# Patient Record
Sex: FEMALE | Race: WHITE | HISPANIC OR LATINO | Employment: FULL TIME | URBAN - METROPOLITAN AREA
[De-identification: names, ages, dates, MRNs, and addresses within clinical notes are randomized per-mention and may not be internally consistent; named-entity substitution may affect disease eponyms.]

---

## 2017-07-21 ENCOUNTER — HOSPITAL ENCOUNTER (EMERGENCY)
Facility: MEDICAL CENTER | Age: 27
End: 2017-07-21
Attending: EMERGENCY MEDICINE
Payer: MEDICAID

## 2017-07-21 VITALS
WEIGHT: 234.35 LBS | DIASTOLIC BLOOD PRESSURE: 40 MMHG | TEMPERATURE: 98.1 F | RESPIRATION RATE: 16 BRPM | BODY MASS INDEX: 41.52 KG/M2 | HEIGHT: 63 IN | HEART RATE: 92 BPM | SYSTOLIC BLOOD PRESSURE: 108 MMHG | OXYGEN SATURATION: 100 %

## 2017-07-21 DIAGNOSIS — B34.9 VIRAL SYNDROME: ICD-10-CM

## 2017-07-21 DIAGNOSIS — G43.019 INTRACTABLE MIGRAINE WITHOUT AURA AND WITHOUT STATUS MIGRAINOSUS: ICD-10-CM

## 2017-07-21 PROCEDURE — 36415 COLL VENOUS BLD VENIPUNCTURE: CPT

## 2017-07-21 PROCEDURE — 96375 TX/PRO/DX INJ NEW DRUG ADDON: CPT

## 2017-07-21 PROCEDURE — 700111 HCHG RX REV CODE 636 W/ 250 OVERRIDE (IP): Performed by: EMERGENCY MEDICINE

## 2017-07-21 PROCEDURE — 99284 EMERGENCY DEPT VISIT MOD MDM: CPT

## 2017-07-21 PROCEDURE — 96374 THER/PROPH/DIAG INJ IV PUSH: CPT

## 2017-07-21 PROCEDURE — 700111 HCHG RX REV CODE 636 W/ 250 OVERRIDE (IP)

## 2017-07-21 PROCEDURE — 700105 HCHG RX REV CODE 258: Performed by: EMERGENCY MEDICINE

## 2017-07-21 RX ORDER — KETOROLAC TROMETHAMINE 30 MG/ML
30 INJECTION, SOLUTION INTRAMUSCULAR; INTRAVENOUS ONCE
Status: COMPLETED | OUTPATIENT
Start: 2017-07-21 | End: 2017-07-21

## 2017-07-21 RX ORDER — DIPHENHYDRAMINE HYDROCHLORIDE 50 MG/ML
50 INJECTION INTRAMUSCULAR; INTRAVENOUS ONCE
Status: COMPLETED | OUTPATIENT
Start: 2017-07-21 | End: 2017-07-21

## 2017-07-21 RX ORDER — SODIUM CHLORIDE 9 MG/ML
1000 INJECTION, SOLUTION INTRAVENOUS ONCE
Status: COMPLETED | OUTPATIENT
Start: 2017-07-21 | End: 2017-07-21

## 2017-07-21 RX ORDER — DIPHENHYDRAMINE HYDROCHLORIDE 50 MG/ML
INJECTION INTRAMUSCULAR; INTRAVENOUS
Status: COMPLETED
Start: 2017-07-21 | End: 2017-07-21

## 2017-07-21 RX ADMIN — SODIUM CHLORIDE 1000 ML: 9 INJECTION, SOLUTION INTRAVENOUS at 02:25

## 2017-07-21 RX ADMIN — DIPHENHYDRAMINE HYDROCHLORIDE 50 MG: 50 INJECTION, SOLUTION INTRAMUSCULAR; INTRAVENOUS at 02:29

## 2017-07-21 RX ADMIN — PROCHLORPERAZINE EDISYLATE 10 MG: 5 INJECTION INTRAMUSCULAR; INTRAVENOUS at 02:26

## 2017-07-21 RX ADMIN — DIPHENHYDRAMINE HYDROCHLORIDE 50 MG: 50 INJECTION INTRAMUSCULAR; INTRAVENOUS at 02:29

## 2017-07-21 RX ADMIN — KETOROLAC TROMETHAMINE 30 MG: 30 INJECTION, SOLUTION INTRAMUSCULAR at 02:26

## 2017-07-21 ASSESSMENT — LIFESTYLE VARIABLES
EVER FELT BAD OR GUILTY ABOUT YOUR DRINKING: NO
TOTAL SCORE: 0
HAVE PEOPLE ANNOYED YOU BY CRITICIZING YOUR DRINKING: NO
TOTAL SCORE: 0
TOTAL SCORE: 0
CONSUMPTION TOTAL: INCOMPLETE
HAVE YOU EVER FELT YOU SHOULD CUT DOWN ON YOUR DRINKING: NO
DO YOU DRINK ALCOHOL: YES
EVER HAD A DRINK FIRST THING IN THE MORNING TO STEADY YOUR NERVES TO GET RID OF A HANGOVER: NO

## 2017-07-21 ASSESSMENT — PAIN SCALES - GENERAL
PAINLEVEL_OUTOF10: 6
PAINLEVEL_OUTOF10: 8

## 2017-07-21 NOTE — ED PROVIDER NOTES
ED Provider Note    CHIEF COMPLAINT  Chief Complaint   Patient presents with   • Headache       HPI  Abril Vance is a 26 y.o. female who presents here for evaluation of headache. Patient states that she has a history of intermittent migraines, states that she's been having some worse symptoms over the past month. The patient describes having worsening symptoms today associated with some bilateral hand numbness intermittently over the past 4 days. The patient describes the pain as throbbing, located posterior aspect of the skull, radiating forward, not particularly light sensitive, not associated with nausea, not maximal at onset, and not improved with Tylenol. The patient states that secondary to her headache she said present here for evaluation.    REVIEW OF SYSTEMS  See HPI for further details. All other systems are negative.     PAST MEDICAL HISTORY   has a past medical history of Gallstones; Supervision of normal pregnancy (4/5/2010); Morbid obesity (CMS-HCC) (4/5/2010); Migraines (4/5/2010); and Vaginal trichomoniasis (4/5/2010).    SOCIAL HISTORY  Social History     Social History Main Topics   • Smoking status: Never Smoker    • Smokeless tobacco: Not on file   • Alcohol Use: No   • Drug Use: No   • Sexual Activity: Not on file       SURGICAL HISTORY   has past surgical history that includes lauren by laparoscopy (10/14/2010).    CURRENT MEDICATIONS  Home Medications     Reviewed by Amy Noriega R.N. (Registered Nurse) on 07/21/17 at 0154  Med List Status: Partial    Medication Last Dose Status    cefUROXime (CEFTIN) 250 MG Tab  Active    hydrocodone-acetaminophen (NORCO) 5-325 MG Tab per tablet  Active    metoclopramide (REGLAN) 10 MG TABS  Active    sumatriptan (IMITREX) 50 MG TABS 8/2/2015 Active                ALLERGIES  Allergies   Allergen Reactions   • Nkda [No Known Drug Allergy]        PHYSICAL EXAM  VITAL SIGNS: /74 mmHg  Pulse 102  Temp(Src) 36.7 °C (98.1 °F)  Resp 18  Ht 1.6 m  "(5' 3\")  Wt 106.3 kg (234 lb 5.6 oz)  BMI 41.52 kg/m2  SpO2 100%  LMP 08/29/2009   Pulse ox interpretation: I interpret this pulse ox as normal.  Constitutional: Alert in mild distress.  HENT: Normocephalic, Atraumatic, Bilateral external ears normal. Nose normal.   Eyes: Pupils are equal and reactive. Conjunctiva normal, non-icteric.   Heart: Regular rate and rythm.  Lungs: No audible wheezing, no increased work of breathing, no accessory muscle use.  Abdomen: Soft, non-distended, non-tender   Skin: Warm, Dry, No erythema, No rash.   Neurologic: Alert, Grossly non-focal, strength is 5/5 in bilateral upper extremities, patient has intact sensation throughout the bilateral upper extremities.   Psychiatric: Affect normal, Judgment normal, Mood normal, appears alert and not intoxicated.       COURSE & MEDICAL DECISION MAKING  Pertinent Labs & Imaging studies reviewed. (See chart for details)    2:55 AM patient reevaluated, migraine is resolved    Patient presented here for evaluation of headache. The patient here has a normal neurologic exam, and given her symptoms and history likely has recurrence of migraine. This is likely triggered by viral syndrome given the patient's body aches and subjective fevers. Here she has no focal findings, has no history or exam findings suggestive of pneumonia, urinary infection or any skin infection. Given this, the patient was given a liter of normal saline secondary to some slight dehydration, Advil, Compazine, and Toradol for her migraine with significant improvement of her symptoms.    The patient will not drink alcohol nor drive with prescribed medications. The patient will return for worsening symptoms and is stable at the time of discharge. The patient verbalizes understanding and will comply.    The patient is referred to a primary physician for blood pressure management, diabetic screening, and for all other preventative health concerns, should they be present.    FINAL " IMPRESSION  1. Migraine headache, intractable, without aura  2.   3.         Electronically signed by: Luc Contreras, 7/21/2017 1:58 AM      This record was made with a voice recognition software. I have tried to correct any grammar, spelling or context errors to the best of my ability, but errors may still remain. Interpretation of this chart should be taken in this context.

## 2017-07-21 NOTE — ED AVS SNAPSHOT
Home Care Instructions                                                                                                                Abril Vance   MRN: [id number]    Department:  [facility], Emergency Dept   Date of Visit:  7/21/2017            [facility], Emergency Dept    [address]    [place] [address]    Phone:  [phone]      You were seen by     Luc Contreras M.D.      Your Diagnosis Was     Intractable migraine without aura and without status migrainosus     G43.019       These are the medications you received during your hospitalization from 07/21/2017 0013 to 07/21/2017 0259     Date/Time Order Dose Route Action    07/21/2017 0225 NS infusion 1,000 mL 1,000 mL Intravenous New Bag    07/21/2017 0226 ketorolac (TORADOL) injection 30 mg 30 mg Intravenous Given    07/21/2017 0229 diphenhydrAMINE (BENADRYL) injection 50 mg 50 mg Intravenous Given    07/21/2017 0226 prochlorperazine (COMPAZINE) injection 10 mg 10 mg Intravenous Given      Follow-up Information     1. Schedule an appointment as soon as possible for a visit with Yariel Eckert M.D..    Specialty:  Family Medicine    Contact information    [address]  [place] [address] [phone]        Medication Information     Review all of your home medications and newly ordered medications with your primary doctor and/or pharmacist as soon as possible. Follow medication instructions as directed by your doctor and/or pharmacist.     Please keep your complete medication list with you and share with your physician. Update the information when medications are discontinued, doses are changed, or new medications (including over-the-counter products) are added; and carry medication information at all times in the event of emergency situations.               Medication List      ASK your doctor about these medications        Instructions    Morning Afternoon Evening Bedtime    cefUROXime 250 MG Tabs      Commonly known as:  CEFTIN        Take 1 Tab by mouth 2 times a day.   Dose:  250 mg                        hydrocodone-acetaminophen 5-325 MG Tabs per tablet   Commonly known as:  NORCO        Take 1-2 Tabs by mouth every four hours as needed.   Dose:  1-2 Tab                        metoclopramide 10 MG Tabs   Commonly known as:  REGLAN        Take 1 Tab by mouth 4 times a day as needed (nausea or headache).   Dose:  10 mg                        sumatriptan 50 MG Tabs   Commonly known as:  IMITREX        Take 1 Tab by mouth Once PRN for Migraine for 1 dose.   Dose:  50 mg                                Procedures and tests performed during your visit     INSERT IV        Discharge Instructions       Migraine Headache  A migraine headache is an intense, throbbing pain on one or both sides of your head. A migraine can last for 30 minutes to several hours.  CAUSES   The exact cause of a migraine headache is not always known. However, a migraine may be caused when nerves in the brain become irritated and release chemicals that cause inflammation. This causes pain.  Certain things may also trigger migraines, such as:  · Alcohol.  · Smoking.  · Stress.  · Menstruation.  · Aged cheeses.  · Foods or drinks that contain nitrates, glutamate, aspartame, or tyramine.  · Lack of sleep.  · Chocolate.  · Caffeine.  · Hunger.  · Physical exertion.  · Fatigue.  · Medicines used to treat chest pain (nitroglycerine), birth control pills, estrogen, and some blood pressure medicines.  SIGNS AND SYMPTOMS  · Pain on one or both sides of your head.  · Pulsating or throbbing pain.  · Severe pain that prevents daily activities.  · Pain that is aggravated by any physical activity.  · Nausea, vomiting, or both.  · Dizziness.  · Pain with exposure to bright lights, loud noises, or activity.  · General sensitivity to bright lights, loud noises, or smells.  Before you get a migraine, you may get warning signs that a migraine is coming (aura).  An aura may include:  · Seeing flashing lights.  · Seeing bright spots, halos, or zigzag lines.  · Having tunnel vision or blurred vision.  · Having feelings of numbness or tingling.  · Having trouble talking.  · Having muscle weakness.  DIAGNOSIS   A migraine headache is often diagnosed based on:  · Symptoms.  · Physical exam.  · A CT scan or MRI of your head. These imaging tests cannot diagnose migraines, but they can help rule out other causes of headaches.  TREATMENT  Medicines may be given for pain and nausea. Medicines can also be given to help prevent recurrent migraines.   HOME CARE INSTRUCTIONS  · Only take over-the-counter or prescription medicines for pain or discomfort as directed by your health care provider. The use of long-term narcotics is not recommended.  · Lie down in a dark, quiet room when you have a migraine.  · Keep a journal to find out what may trigger your migraine headaches. For example, write down:  ¨ What you eat and drink.  ¨ How much sleep you get.  ¨ Any change to your diet or medicines.  · Limit alcohol consumption.  · Quit smoking if you smoke.  · Get 7-9 hours of sleep, or as recommended by your health care provider.  · Limit stress.  · Keep lights dim if bright lights bother you and make your migraines worse.  SEEK IMMEDIATE MEDICAL CARE IF:   · Your migraine becomes severe.  · You have a fever.  · You have a stiff neck.  · You have vision loss.  · You have muscular weakness or loss of muscle control.  · You start losing your balance or have trouble walking.  · You feel faint or pass out.  · You have severe symptoms that are different from your first symptoms.  MAKE SURE YOU:   · Understand these instructions.  · Will watch your condition.  · Will get help right away if you are not doing well or get worse.     This information is not intended to replace advice given to you by your health care provider. Make sure you discuss any questions you have with your health care provider.      Document Released: 12/18/2006 Document Revised: 01/08/2016 Document Reviewed: 08/25/2014  Elsevier Interactive Patient Education ©2016 Elsevier Inc.            Patient Information     Patient Information    Following emergency treatment: all patient requiring follow-up care must return either to a private physician or a clinic if your condition worsens before you are able to obtain further medical attention, please return to the emergency room.     Billing Information    At [facility], we work to make the billing process streamlined for our patients.  Our Representatives are here to answer any questions you may have regarding your hospital bill.  If you have insurance coverage and have supplied your insurance information to us, we will submit a claim to your insurer on your behalf.  Should you have any questions regarding your bill, we can be reached online or by phone as follows:  Online: You are able pay your bills online or live chat with our representatives about any billing questions you may have. We are here to help Monday - Friday from 8:00am to 7:30pm and 9:00am - 12:00pm on Saturdays.  Please visit [email or web address]  for more information.   Phone:  [phone] or [phone]    Please note that your emergency physician, surgeon, pathologist, radiologist, anesthesiologist, and other specialists are not employed by [facility] and will therefore bill separately for their services.  Please contact them directly for any questions concerning their bills at the numbers below:     Emergency Physician Services:  [phone]  [facility]:  [phone]  [facility]:  [phone]  [facility]:  [phone]    1. Your final bill may vary from the amount quoted upon discharge if all procedures are not complete at that time, or if your doctor has additional procedures of which we are not aware. You will receive an additional bill  if you return to the Emergency Department at [facility] for suture removal regardless of the facility of which the sutures were placed.     2. Please arrange for settlement of this account at the emergency registration.    3. All self-pay accounts are due in full at the time of treatment.  If you are unable to meet this obligation then payment is expected within 4-5 days.     4. If you have had radiology studies (CT, X-ray, Ultrasound, MRI), you have received a preliminary result during your emergency department visit. Please contact the radiology department [phone] to receive a copy of your final result. Please discuss the Final result with your primary physician or with the follow up physician provided.     Crisis Hotline:  [place] Crisis Hotline:  7-980-ZIFWLNP or 1-483.405.1873  [place] Crisis Hotline:    1-363.151.2104 or 509-117-5017         ED Discharge Follow Up Questions    1. In order to provide you with very good care, we would like to follow up with a phone call in the next few days.  May we have your permission to contact you?     YES /  NO    2. What is the best phone number to call you? (       )_____-__________    3. What is the best time to call you?      Morning  /  Afternoon  /  Evening                   Patient Signature:  ____________________________________________________________    Date:  ____________________________________________________________

## 2017-07-21 NOTE — ED AVS SNAPSHOT
Dynadmic Access Code: Activation code not generated  Current Dynadmic Status: Active    [employer]  A secure, online tool to manage your health information     "[employer]’s Dynadmic® is a secure, online tool that connects you to your personalized health information from the privacy of your home -- day or night - making it very easy for you to manage your healthcare. Once the activation process is completed, you can even access your medical information using the Dynadmic omer, which is available for free in the Apple Omer store or Google Play store.     Dynadmic provides the following levels of access (as shown below):   My Chart Features   [facility] Primary Care Doctor [facility]  Specialists [facility]  Urgent  Care Non-[facility]  Primary Care  Doctor   Email your healthcare team securely and privately 24/7 X X X X   Manage appointments: schedule your next appointment; view details of past/upcoming appointments X      Request prescription refills. X      View recent personal medical records, including lab and immunizations X X X X   View health record, including health history, allergies, medications X X X X   Read reports about your outpatient visits, procedures, consult and ER notes X X X X   See your discharge summary, which is a recap of your hospital and/or ER visit that includes your diagnosis, lab results, and care plan. X X       How to register for Dynadmic:  1. Go to  [email or web address].  2. Click on the Sign Up Now box, which takes you to the New Member Sign Up page. You will need to provide the following information:  a. Enter your Dynadmic Access Code exactly as it appears at the top of this page. (You will not need to use this code after you’ve completed the sign-up process. If you do not sign up before the expiration date, you must request a new code.)   b. Enter your date of birth.   c. Enter your home email address.   d. Click Submit, and follow the next screen’s instructions.  3. Create a Dynadmic ID. This will  be your [employer] login ID and cannot be changed, so think of one that is secure and easy to remember.  4. Create a [employer] password. You can change your password at any time.  5. Enter your Password Reset Question and Answer. This can be used at a later time if you forget your password.   6. Enter your e-mail address. This allows you to receive e-mail notifications when new information is available in [employer].  7. Click Sign Up. You can now view your health information.    For assistance activating your [employer] account, call [phone]

## 2017-07-21 NOTE — DISCHARGE INSTRUCTIONS
Migraine Headache  A migraine headache is an intense, throbbing pain on one or both sides of your head. A migraine can last for 30 minutes to several hours.  CAUSES   The exact cause of a migraine headache is not always known. However, a migraine may be caused when nerves in the brain become irritated and release chemicals that cause inflammation. This causes pain.  Certain things may also trigger migraines, such as:  · Alcohol.  · Smoking.  · Stress.  · Menstruation.  · Aged cheeses.  · Foods or drinks that contain nitrates, glutamate, aspartame, or tyramine.  · Lack of sleep.  · Chocolate.  · Caffeine.  · Hunger.  · Physical exertion.  · Fatigue.  · Medicines used to treat chest pain (nitroglycerine), birth control pills, estrogen, and some blood pressure medicines.  SIGNS AND SYMPTOMS  · Pain on one or both sides of your head.  · Pulsating or throbbing pain.  · Severe pain that prevents daily activities.  · Pain that is aggravated by any physical activity.  · Nausea, vomiting, or both.  · Dizziness.  · Pain with exposure to bright lights, loud noises, or activity.  · General sensitivity to bright lights, loud noises, or smells.  Before you get a migraine, you may get warning signs that a migraine is coming (aura). An aura may include:  · Seeing flashing lights.  · Seeing bright spots, halos, or zigzag lines.  · Having tunnel vision or blurred vision.  · Having feelings of numbness or tingling.  · Having trouble talking.  · Having muscle weakness.  DIAGNOSIS   A migraine headache is often diagnosed based on:  · Symptoms.  · Physical exam.  · A CT scan or MRI of your head. These imaging tests cannot diagnose migraines, but they can help rule out other causes of headaches.  TREATMENT  Medicines may be given for pain and nausea. Medicines can also be given to help prevent recurrent migraines.   HOME CARE INSTRUCTIONS  · Only take over-the-counter or prescription medicines for pain or discomfort as directed by your  health care provider. The use of long-term narcotics is not recommended.  · Lie down in a dark, quiet room when you have a migraine.  · Keep a journal to find out what may trigger your migraine headaches. For example, write down:  ¨ What you eat and drink.  ¨ How much sleep you get.  ¨ Any change to your diet or medicines.  · Limit alcohol consumption.  · Quit smoking if you smoke.  · Get 7-9 hours of sleep, or as recommended by your health care provider.  · Limit stress.  · Keep lights dim if bright lights bother you and make your migraines worse.  SEEK IMMEDIATE MEDICAL CARE IF:   · Your migraine becomes severe.  · You have a fever.  · You have a stiff neck.  · You have vision loss.  · You have muscular weakness or loss of muscle control.  · You start losing your balance or have trouble walking.  · You feel faint or pass out.  · You have severe symptoms that are different from your first symptoms.  MAKE SURE YOU:   · Understand these instructions.  · Will watch your condition.  · Will get help right away if you are not doing well or get worse.     This information is not intended to replace advice given to you by your health care provider. Make sure you discuss any questions you have with your health care provider.     Document Released: 12/18/2006 Document Revised: 01/08/2016 Document Reviewed: 08/25/2014  [employer] Interactive Patient Education ©2016 [employer]

## 2017-07-21 NOTE — ED AVS SNAPSHOT
7/21/2017    Abril Vance  [address]  [place] [address]    Dear Abril:    [facility] wants to ensure your discharge home is safe and you or your loved ones have had all of your questions answered regarding your care after you leave the hospital.    Below is a list of resources and contact information should you have any questions regarding your hospital stay, follow-up instructions, or active medical symptoms.    Questions or Concerns Regarding… Contact   Medical Questions Related to Your Discharge  (7 days a week, 8am-5pm) Contact a Nurse Care Coordinator   [phone]   Medical Questions Not Related to Your Discharge  (24 hours a day / 7 days a week)  Contact the Nurse Health Line   [phone]    Medications or Discharge Instructions Refer to your discharge packet   or contact your [facility] Primary Care Provider   [phone]   Follow-up Appointment(s) Schedule your appointment via [employer]   or contact Scheduling [phone]   Billing Review your statement via [employer]  or contact Billing [phone]   Medical Records Review your records via [employer]   or contact Medical Records [phone]     You may receive a telephone call within two days of discharge. This call is to make certain you understand your discharge instructions and have the opportunity to have any questions answered. You can also easily access your medical information, test results and upcoming appointments via the [employer] free online health management tool. You can learn more and sign up at [email or web address]. For assistance setting up your [employer] account, please call [phone].    Once again, we want to ensure your discharge home is safe and that you have a clear understanding of any next steps in your care. If you have any questions or concerns, please do not hesitate to contact us, we are here for you. Thank you for choosing [facility] for your healthcare needs.    Sincerely,    Your [facility] Healthcare Team

## 2017-07-21 NOTE — ED NOTES
Pt. Ambulated to Yellow 63 with steady gait. Pt. States 8/10 posterior headache pain with blurry vision and bilateral arm and numbness tingling.

## 2017-07-21 NOTE — ED NOTES
Discharge instructions given to patient, a verbal understanding of all instructions was stated. IV removed, cathlon intact, site without s/s of infection. Pt preferred to walk out accompanied by daughters. Pt. States she has a ride coming to pick her up. VSS, all belongings accounted for.

## 2017-07-21 NOTE — ED NOTES
Abril Vance  26 y.o.  female  Chief Complaint   Patient presents with   • Headache     Present to triage c/o pain at the back of head x 4-5 weeks. Per patient constant pain. Been taking tylenol. Tonight headache worse. Also c/o numbness on both arms x 4 days. +CMS. Neuro intact. Tearful in triage.

## 2017-09-14 ENCOUNTER — APPOINTMENT (OUTPATIENT)
Dept: RADIOLOGY | Facility: MEDICAL CENTER | Age: 27
End: 2017-09-14
Attending: EMERGENCY MEDICINE
Payer: MEDICAID

## 2017-09-14 ENCOUNTER — HOSPITAL ENCOUNTER (EMERGENCY)
Facility: MEDICAL CENTER | Age: 27
End: 2017-09-14
Attending: EMERGENCY MEDICINE
Payer: MEDICAID

## 2017-09-14 VITALS
TEMPERATURE: 98.4 F | WEIGHT: 232.81 LBS | OXYGEN SATURATION: 98 % | HEART RATE: 60 BPM | RESPIRATION RATE: 18 BRPM | HEIGHT: 63 IN | SYSTOLIC BLOOD PRESSURE: 107 MMHG | BODY MASS INDEX: 41.25 KG/M2 | DIASTOLIC BLOOD PRESSURE: 56 MMHG

## 2017-09-14 DIAGNOSIS — Z3A.01 LESS THAN 8 WEEKS GESTATION OF PREGNANCY: ICD-10-CM

## 2017-09-14 DIAGNOSIS — R10.84 GENERALIZED ABDOMINAL PAIN: ICD-10-CM

## 2017-09-14 DIAGNOSIS — F41.8 SITUATIONAL ANXIETY: ICD-10-CM

## 2017-09-14 DIAGNOSIS — N39.0 ACUTE UTI: ICD-10-CM

## 2017-09-14 DIAGNOSIS — M54.50 ACUTE MIDLINE LOW BACK PAIN WITHOUT SCIATICA: ICD-10-CM
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
## 2017-09-14 LAB
ALBUMIN SERPL BCP-MCNC: 4.1 G/DL (ref 3.2–4.9)
ALBUMIN/GLOB SERPL: 1.3 G/DL
ALP SERPL-CCNC: 70 U/L (ref 30–99)
ALT SERPL-CCNC: 7 U/L (ref 2–50)
ANION GAP SERPL CALC-SCNC: 10 MMOL/L (ref 0–11.9)
APPEARANCE UR: CLEAR
AST SERPL-CCNC: 17 U/L (ref 12–45)
B-HCG SERPL-ACNC: ABNORMAL MIU/ML (ref 0–5)
BACTERIA #/AREA URNS HPF: ABNORMAL /HPF
BASOPHILS # BLD AUTO: 0.9 % (ref 0–1.8)
BASOPHILS # BLD: 0.05 K/UL (ref 0–0.12)
BILIRUB SERPL-MCNC: 0.5 MG/DL (ref 0.1–1.5)
BILIRUB UR QL STRIP.AUTO: NEGATIVE
BUN SERPL-MCNC: 6 MG/DL (ref 8–22)
CALCIUM SERPL-MCNC: 9.4 MG/DL (ref 8.5–10.5)
CHLORIDE SERPL-SCNC: 106 MMOL/L (ref 96–112)
CO2 SERPL-SCNC: 17 MMOL/L (ref 20–33)
COLOR UR: ABNORMAL
CREAT SERPL-MCNC: 0.45 MG/DL (ref 0.5–1.4)
CULTURE IF INDICATED INDCX: YES UA CULTURE
EOSINOPHIL # BLD AUTO: 0.07 K/UL (ref 0–0.51)
EOSINOPHIL NFR BLD: 1.2 % (ref 0–6.9)
EPI CELLS #/AREA URNS HPF: ABNORMAL /HPF
ERYTHROCYTE [DISTWIDTH] IN BLOOD BY AUTOMATED COUNT: 49.2 FL (ref 35.9–50)
GFR SERPL CREATININE-BSD FRML MDRD: >60 ML/MIN/1.73 M 2
GLOBULIN SER CALC-MCNC: 3.1 G/DL (ref 1.9–3.5)
GLUCOSE SERPL-MCNC: 84 MG/DL (ref 65–99)
GLUCOSE UR STRIP.AUTO-MCNC: NEGATIVE MG/DL
HCT VFR BLD AUTO: 34.2 % (ref 37–47)
HGB BLD-MCNC: 11.2 G/DL (ref 12–16)
HYALINE CASTS #/AREA URNS LPF: ABNORMAL /LPF
IMM GRANULOCYTES # BLD AUTO: 0.02 K/UL (ref 0–0.11)
IMM GRANULOCYTES NFR BLD AUTO: 0.3 % (ref 0–0.9)
KETONES UR STRIP.AUTO-MCNC: ABNORMAL MG/DL
LEUKOCYTE ESTERASE UR QL STRIP.AUTO: ABNORMAL
LIPASE SERPL-CCNC: 33 U/L (ref 11–82)
LYMPHOCYTES # BLD AUTO: 1.88 K/UL (ref 1–4.8)
LYMPHOCYTES NFR BLD: 32.9 % (ref 22–41)
MCH RBC QN AUTO: 31.2 PG (ref 27–33)
MCHC RBC AUTO-ENTMCNC: 32.7 G/DL (ref 33.6–35)
MCV RBC AUTO: 95.3 FL (ref 81.4–97.8)
MICRO URNS: ABNORMAL
MONOCYTES # BLD AUTO: 0.45 K/UL (ref 0–0.85)
MONOCYTES NFR BLD AUTO: 7.9 % (ref 0–13.4)
NEUTROPHILS # BLD AUTO: 3.25 K/UL (ref 2–7.15)
NEUTROPHILS NFR BLD: 56.8 % (ref 44–72)
NITRITE UR QL STRIP.AUTO: NEGATIVE
NRBC # BLD AUTO: 0 K/UL
NRBC BLD AUTO-RTO: 0 /100 WBC
PH UR STRIP.AUTO: 7 [PH]
PLATELET # BLD AUTO: 141 K/UL (ref 164–446)
PMV BLD AUTO: 11.9 FL (ref 9–12.9)
POTASSIUM SERPL-SCNC: 4 MMOL/L (ref 3.6–5.5)
PROT SERPL-MCNC: 7.2 G/DL (ref 6–8.2)
PROT UR QL STRIP: NEGATIVE MG/DL
RBC # BLD AUTO: 3.59 M/UL (ref 4.2–5.4)
RBC # URNS HPF: ABNORMAL /HPF
RBC UR QL AUTO: NEGATIVE
SODIUM SERPL-SCNC: 133 MMOL/L (ref 135–145)
SP GR UR STRIP.AUTO: 1
UROBILINOGEN UR STRIP.AUTO-MCNC: NORMAL MG/DL
WBC # BLD AUTO: 5.7 K/UL (ref 4.8–10.8)
WBC #/AREA URNS HPF: ABNORMAL /HPF

## 2017-09-14 PROCEDURE — 83690 ASSAY OF LIPASE: CPT

## 2017-09-14 PROCEDURE — 85025 COMPLETE CBC W/AUTO DIFF WBC: CPT

## 2017-09-14 PROCEDURE — 84702 CHORIONIC GONADOTROPIN TEST: CPT

## 2017-09-14 PROCEDURE — 87086 URINE CULTURE/COLONY COUNT: CPT

## 2017-09-14 PROCEDURE — 99284 EMERGENCY DEPT VISIT MOD MDM: CPT

## 2017-09-14 PROCEDURE — 81001 URINALYSIS AUTO W/SCOPE: CPT

## 2017-09-14 PROCEDURE — 76817 TRANSVAGINAL US OBSTETRIC: CPT

## 2017-09-14 PROCEDURE — 80053 COMPREHEN METABOLIC PANEL: CPT

## 2017-09-14 RX ORDER — CEFDINIR 300 MG/1
300 CAPSULE ORAL 2 TIMES DAILY
Qty: 20 CAP | Refills: 0 | Status: SHIPPED | OUTPATIENT
Start: 2017-09-14 | End: 2017-09-21

## 2017-09-14 ASSESSMENT — PAIN SCALES - GENERAL
PAINLEVEL_OUTOF10: 6
PAINLEVEL_OUTOF10: 4

## 2017-09-14 ASSESSMENT — ENCOUNTER SYMPTOMS
BACK PAIN: 1
FOCAL WEAKNESS: 0
FALLS: 1
ABDOMINAL PAIN: 1
LOSS OF CONSCIOUSNESS: 0

## 2017-09-14 NOTE — LETTER
9/20/2017               Abrilabhinav Boyd [address] [address]        Dear Abril (MR#[id number])    As we have been unable to contact you by phone, this letter is sent in regards to your recent visit to the [facility] Emergency Department on 9/14/2017.  During the visit, tests were performed to assist the physician in a medical diagnosis.  A review of those tests requires that we notify you of the following:    Your urine culture and sensitivity that was POSITIVE for a bacteria called Gardnerella vaginalis and further treatment may be necessary. The currently prescribed antibiotic (cefdinir) may not be effective in treating your infection. PLEASE CONTACT ME AS SOON AS POSSIBLE AT THE NUMBER BELOW.       Because of the above findings, we advise that you see your private physician or you may return to the Emergency Department for additional treatment.      Please feel free to contact me at the number below if you have any questions or concerns. Thank you for your cooperation in the matter.    Sincerely,  ED Culture Follow-Up Staff  Jenny Dseai, PharmD    [facility], Emergency Department  [address] [place] [id number]  [phone] (ED Culture Line)

## 2017-09-14 NOTE — ED NOTES
Pt to triage c/o low back pain and abd cramping after falling in the shower last night. Pt 6 weeks pregnant, denies vaginal bleeding.   Pt advised to return to triage nurse for any changes or concerns.

## 2017-09-14 NOTE — ED NOTES
Discharge instructions given. Verbalizes understanding. Left with all belongings. Walked to JANESSA mishra.

## 2017-09-14 NOTE — DISCHARGE INSTRUCTIONS
Return to the ER for more pain, fever, if you have vaginal bleeding or other concerns.     Abdominal Pain (Nonspecific)  Your exam might not show the exact reason you have abdominal pain. Since there are many different causes of abdominal pain, another checkup and more tests may be needed. It is very important to follow up for lasting (persistent) or worsening symptoms. A possible cause of abdominal pain in any person who still has his or her appendix is acute appendicitis. Appendicitis is often hard to diagnose. Normal blood tests, urine tests, ultrasound, and CT scans do not completely rule out early appendicitis or other causes of abdominal pain. Sometimes, only the changes that happen over time will allow appendicitis and other causes of abdominal pain to be determined. Other potential problems that may require surgery may also take time to become more apparent. Because of this, it is important that you follow all of the instructions below.  HOME CARE INSTRUCTIONS   · Rest as much as possible.   · Do not eat solid food until your pain is gone.   · While adults or children have pain: A diet of water, weak decaffeinated tea, broth or bouillon, gelatin, oral rehydration solutions (ORS), frozen ice pops, or ice chips may be helpful.   · When pain is gone in adults or children: Start a light diet (dry toast, crackers, applesauce, or white rice). Increase the diet slowly as long as it does not bother you. Eat no dairy products (including cheese and eggs) and no spicy, fatty, fried, or high-fiber foods.   · Use no alcohol, caffeine, or cigarettes.   · Take your regular medicines unless your caregiver told you not to.   · Take any prescribed medicine as directed.   · Only take over-the-counter or prescription medicines for pain, discomfort, or fever as directed by your caregiver. Do not give aspirin to children.   If your caregiver has given you a follow-up appointment, it is very important to keep that appointment. Not  keeping the appointment could result in a permanent injury and/or lasting (chronic) pain and/or disability. If there is any problem keeping the appointment, you must call to reschedule.   SEEK IMMEDIATE MEDICAL CARE IF:   · Your pain is not gone in 24 hours.   · Your pain becomes worse, changes location, or feels different.   · You or your child has an oral temperature above 102° F (38.9° C), not controlled by medicine.   · Your baby is older than 3 months with a rectal temperature of 102° F (38.9° C) or higher.   · Your baby is 3 months old or younger with a rectal temperature of 100.4° F (38° C) or higher.   · You have shaking chills.   · You keep throwing up (vomiting) or cannot drink liquids.   · There is blood in your vomit or you see blood in your bowel movements.   · Your bowel movements become dark or black.   · You have frequent bowel movements.   · Your bowel movements stop (become blocked) or you cannot pass gas.   · You have bloody, frequent, or painful urination.   · You have yellow discoloration in the skin or whites of the eyes.   · Your stomach becomes bloated or bigger.   · You have dizziness or fainting.   · You have chest or back pain.   MAKE SURE YOU:   · Understand these instructions.   · Will watch your condition.   · Will get help right away if you are not doing well or get worse.   Document Released: 12/18/2006 Document Revised: 03/11/2013 Document Reviewed: 11/15/2010  ExitCare® Patient Information ©2013 [employer].        Urinary Tract Infection  Urinary tract infections (UTIs) can develop anywhere along your urinary tract. Your urinary tract is your body's drainage system for removing wastes and extra water. Your urinary tract includes two kidneys, two ureters, a bladder, and a urethra. Your kidneys are a pair of bean-shaped organs. Each kidney is about the size of your fist. They are located below your ribs, one on each side of your spine.  CAUSES  Infections are caused by microbes,  which are microscopic organisms, including fungi, viruses, and bacteria. These organisms are so small that they can only be seen through a microscope. Bacteria are the microbes that most commonly cause UTIs.  SYMPTOMS   Symptoms of UTIs may vary by age and gender of the patient and by the location of the infection. Symptoms in young women typically include a frequent and intense urge to urinate and a painful, burning feeling in the bladder or urethra during urination. Older women and men are more likely to be tired, shaky, and weak and have muscle aches and abdominal pain. A fever may mean the infection is in your kidneys. Other symptoms of a kidney infection include pain in your back or sides below the ribs, nausea, and vomiting.  DIAGNOSIS  To diagnose a UTI, your caregiver will ask you about your symptoms. Your caregiver also will ask to provide a urine sample. The urine sample will be tested for bacteria and white blood cells. White blood cells are made by your body to help fight infection.  TREATMENT   Typically, UTIs can be treated with medication. Because most UTIs are caused by a bacterial infection, they usually can be treated with the use of antibiotics. The choice of antibiotic and length of treatment depend on your symptoms and the type of bacteria causing your infection.  HOME CARE INSTRUCTIONS  · If you were prescribed antibiotics, take them exactly as your caregiver instructs you. Finish the medication even if you feel better after you have only taken some of the medication.  · Drink enough water and fluids to keep your urine clear or pale yellow.  · Avoid caffeine, tea, and carbonated beverages. They tend to irritate your bladder.  · Empty your bladder often. Avoid holding urine for long periods of time.  · Empty your bladder before and after sexual intercourse.  · After a bowel movement, women should cleanse from front to back. Use each tissue only once.  SEEK MEDICAL CARE IF:   · You have back  pain.  · You develop a fever.  · Your symptoms do not begin to resolve within 3 days.  SEEK IMMEDIATE MEDICAL CARE IF:   · You have severe back pain or lower abdominal pain.  · You develop chills.  · You have nausea or vomiting.  · You have continued burning or discomfort with urination.  MAKE SURE YOU:   · Understand these instructions.  · Will watch your condition.  · Will get help right away if you are not doing well or get worse.     This information is not intended to replace advice given to you by your health care provider. Make sure you discuss any questions you have with your health care provider.     Document Released: 09/27/2006 Document Revised: 01/08/2016 Document Reviewed: 01/25/2013  [employer] Interactive Patient Education ©2016 [employer].        Back Pain, Adult  Back pain is very common in adults. The cause of back pain is rarely dangerous and the pain often gets better over time. The cause of your back pain may not be known. Some common causes of back pain include:  · Strain of the muscles or ligaments supporting the spine.  · Wear and tear (degeneration) of the spinal disks.  · Arthritis.  · Direct injury to the back.  For many people, back pain may return. Since back pain is rarely dangerous, most people can learn to manage this condition on their own.  HOME CARE INSTRUCTIONS  Watch your back pain for any changes. The following actions may help to lessen any discomfort you are feeling:  · Remain active. It is stressful on your back to sit or  one place for long periods of time. Do not sit, drive, or  one place for more than 30 minutes at a time. Take short walks on even surfaces as soon as you are able. Try to increase the length of time you walk each day.  · Exercise regularly as directed by your health care provider. Exercise helps your back heal faster. It also helps avoid future injury by keeping your muscles strong and flexible.  · Do not stay in bed. Resting more than 1-2  days can delay your recovery.  · Pay attention to your body when you bend and lift. The most comfortable positions are those that put less stress on your recovering back. Always use proper lifting techniques, including:  ¨ Bending your knees.  ¨ Keeping the load close to your body.  ¨ Avoiding twisting.  · Find a comfortable position to sleep. Use a firm mattress and lie on your side with your knees slightly bent. If you lie on your back, put a pillow under your knees.  · Avoid feeling anxious or stressed. Stress increases muscle tension and can worsen back pain. It is important to recognize when you are anxious or stressed and learn ways to manage it, such as with exercise.  · Take medicines only as directed by your health care provider. Over-the-counter medicines to reduce pain and inflammation are often the most helpful. Your health care provider may prescribe muscle relaxant drugs. These medicines help dull your pain so you can more quickly return to your normal activities and healthy exercise.  · Apply ice to the injured area:  ¨ Put ice in a plastic bag.  ¨ Place a towel between your skin and the bag.  ¨ Leave the ice on for 20 minutes, 2-3 times a day for the first 2-3 days. After that, ice and heat may be alternated to reduce pain and spasms.  · Maintain a healthy weight. Excess weight puts extra stress on your back and makes it difficult to maintain good posture.  SEEK MEDICAL CARE IF:  · You have pain that is not relieved with rest or medicine.  · You have increasing pain going down into the legs or buttocks.  · You have pain that does not improve in one week.  · You have night pain.  · You lose weight.  · You have a fever or chills.  SEEK IMMEDIATE MEDICAL CARE IF:   · You develop new bowel or bladder control problems.  · You have unusual weakness or numbness in your arms or legs.  · You develop nausea or vomiting.  · You develop abdominal pain.  · You feel faint.     This information is not intended to  replace advice given to you by your health care provider. Make sure you discuss any questions you have with your health care provider.     Document Released: 12/18/2006 Document Revised: 01/08/2016 Document Reviewed: 04/21/2015  [employer] Interactive Patient Education ©2016 Elsevier Inc.

## 2017-09-14 NOTE — ED PROVIDER NOTES
ED Provider Note    Scribed for Nate Galeana M.D. by Karley Trujillo. 2017, 11:33 AM.    Primary care provider: Yariel Eckert M.D.  Means of arrival: Walk in   History obtained from: patient   History limited by: none     CHIEF COMPLAINT  Chief Complaint   Patient presents with   • Fall       HPI  Abril Vance is a 27 y.o. female who presents to the Emergency Department after having a mechanical ground level fall while showering 4.5 hours ago. She reports Slipping in the bathroom and following her buttocks.  With this incident with persistent mid lower back pain since her fall. Her pain is a 7/10 in severity. Patient denies loss of consciousness and focal weakness. She confirms ambulating normally since her fall.  Denies any direct trauma to her abdomen.  No numbness or tingling or weakness.  No incontinence.  She is currently 6 weeks pregnant and complains of mild pain to her lower abdomen. Patient denies abdominal trauma, vaginal bleeding and abnormal vaginal discharge.  Her last menstrual period was  and her obstetric history is .       REVIEW OF SYSTEMS  Review of Systems   Gastrointestinal: Positive for abdominal pain.        Negative abdominal trauma.    Genitourinary:        Negative vaginal bleeding and vaginal discharge.    Musculoskeletal: Positive for back pain and falls.   Neurological: Negative for focal weakness and loss of consciousness.   All other systems reviewed and are negative.   C.       PAST MEDICAL HISTORY   has a past medical history of Gallstones; Migraines (2010); Morbid obesity (CMS-HCC) (2010); Supervision of normal pregnancy (2010); and Vaginal trichomoniasis (2010).      SURGICAL HISTORY   has a past surgical history that includes lauren by laparoscopy (10/14/2010).      SOCIAL HISTORY  Social History   Substance Use Topics   • Smoking status: Never Smoker        • Alcohol use No      History   Drug Use No       FAMILY HISTORY  None noted  "      CURRENT MEDICATIONS  Home Medications     Reviewed by Josie Lambert R.N. (Registered Nurse) on 09/14/17 at 1045  Med List Status: Complete   Medication Last Dose Status   Prenatal Vit-Fe Fumarate-FA (PRENATAL VITAMIN PO) 9/14/2017 Active   sumatriptan (IMITREX) 50 MG TABS not taking Active                ALLERGIES  Allergies   Allergen Reactions   • Nkda [No Known Drug Allergy]        PHYSICAL EXAM  VITAL SIGNS: /62   Pulse (!) 57   Temp 37 °C (98.6 °F)   Resp 16   Ht 1.6 m (5' 3\")   Wt 105.6 kg (232 lb 12.9 oz)   LMP 08/29/2009   SpO2 98%   BMI 41.24 kg/m²   Vitals reviewed.  Constitutional: Well developed, Well nourished, No acute distress, Non-toxic appearance.   HENT: Normocephalic, Atraumatic, Bilateral external ears normal, Oropharynx moist, No oral exudates, Nose normal.   Eyes: PERRL, EOMI, Conjunctiva normal, No discharge.   Neck: Normal range of motion, No tenderness, Supple, No stridor.   Cardiovascular: Normal heart rate, Normal rhythm, No murmurs, No rubs, No gallops.   Thorax & Lungs: Normal breath sounds, No respiratory distress, No wheezing, No chest tenderness.   Abdomen: Bowel sounds normal, Soft. No tenderness in the abdomen.  No signs of trauma   Skin: Warm, Dry, No erythema, No rash.   Back: Minimal midline back tenderness.   Musculoskeletal: Good range of motion in all major joints. No edema. No tenderness to palpation or major deformities noted.  Good pulses.  Neurologic: Alert, Normal motor function, Normal sensory function, No focal deficits noted.  Specifically, normal strength and sensation in both lower extremities  Psychiatric: Affect normal        LABS  Results for orders placed or performed during the hospital encounter of 09/14/17   CBC WITH DIFFERENTIAL   Result Value Ref Range    WBC 5.7 4.8 - 10.8 K/uL    RBC 3.59 (L) 4.20 - 5.40 M/uL    Hemoglobin 11.2 (L) 12.0 - 16.0 g/dL    Hematocrit 34.2 (L) 37.0 - 47.0 %    MCV 95.3 81.4 - 97.8 fL    MCH 31.2 27.0 - " 33.0 pg    MCHC 32.7 (L) 33.6 - 35.0 g/dL    RDW 49.2 35.9 - 50.0 fL    Platelet Count 141 (L) 164 - 446 K/uL    MPV 11.9 9.0 - 12.9 fL    Nucleated RBC 0.00 /100 WBC    NRBC (Absolute) 0.00 K/uL    Neutrophils-Polys 56.80 44.00 - 72.00 %    Lymphocytes 32.90 22.00 - 41.00 %    Monocytes 7.90 0.00 - 13.40 %    Eosinophils 1.20 0.00 - 6.90 %    Basophils 0.90 0.00 - 1.80 %    Immature Granulocytes 0.30 0.00 - 0.90 %    Neutrophils (Absolute) 3.25 2.00 - 7.15 K/uL    Lymphs (Absolute) 1.88 1.00 - 4.80 K/uL    Monos (Absolute) 0.45 0.00 - 0.85 K/uL    Eos (Absolute) 0.07 0.00 - 0.51 K/uL    Baso (Absolute) 0.05 0.00 - 0.12 K/uL    Immature Granulocytes (abs) 0.02 0.00 - 0.11 K/uL   COMP METABOLIC PANEL   Result Value Ref Range    Sodium 133 (L) 135 - 145 mmol/L    Potassium 4.0 3.6 - 5.5 mmol/L    Chloride 106 96 - 112 mmol/L    Co2 17 (L) 20 - 33 mmol/L    Anion Gap 10.0 0.0 - 11.9    Glucose 84 65 - 99 mg/dL    Bun 6 (L) 8 - 22 mg/dL    Creatinine 0.45 (L) 0.50 - 1.40 mg/dL    Calcium 9.4 8.5 - 10.5 mg/dL    AST(SGOT) 17 12 - 45 U/L    ALT(SGPT) 7 2 - 50 U/L    Alkaline Phosphatase 70 30 - 99 U/L    Total Bilirubin 0.5 0.1 - 1.5 mg/dL    Albumin 4.1 3.2 - 4.9 g/dL    Total Protein 7.2 6.0 - 8.2 g/dL    Globulin 3.1 1.9 - 3.5 g/dL    A-G Ratio 1.3 g/dL   LIPASE   Result Value Ref Range    Lipase 33 11 - 82 U/L   URINALYSIS CULTURE, IF INDICATED   Result Value Ref Range    Micro Urine Req Microscopic     Color Straw     Character Clear     Specific Gravity 1.005 <1.035    Ph 7.0 5.0 - 8.0    Glucose Negative Negative mg/dL    Ketones Trace (A) Negative mg/dL    Protein Negative Negative mg/dL    Bilirubin Negative Negative    Urobilinogen, Urine Normal Negative    Nitrite Negative Negative    Leukocyte Esterase Large (A) Negative    Occult Blood Negative Negative    Culture Indicated Yes UA Culture   HCG QUANTITATIVE SERUM   Result Value Ref Range    [facility] [address].3 (H) 0.0 - 5.0 mIU/mL   URINE MICROSCOPIC (W/UA)   Result  Value Ref Range    WBC 20-50 (A) /hpf    RBC 0-2 /hpf    Bacteria Few (A) None /hpf    Epithelial Cells Few /hpf    Hyaline Cast 0-2 /lpf   ESTIMATED GFR   Result Value Ref Range    GFR If African American >60 >60 mL/min/1.73 m 2    GFR If Non African American >60 >60 mL/min/1.73 m 2   All labs reviewed by me.        RADIOLOGY  US-OB PELVIS TRANSVAGINAL   Final Result      Intrauterine pregnancy with gestational age by this ultrasound of 6 weeks and 3 days.      Nonvisualization of the ovaries.      Trace amount of fluid in the cul-de-sac.      There is a perigestational collection which may represent a subchorionic hematoma. Follow-up is recommended.      The radiologist's interpretation of all radiological studies have been reviewed by me.        COURSE & MEDICAL DECISION MAKING  Pertinent Labs & Imaging studies reviewed. (See chart for details)    11:20 AM Obtained and reviewed past medical records.    11:33 AM Patient seen and examined at bedside. The patient did not have an Rh ordered due to previously documented Rh positive from previous visit.  Ordered for US OB pelvis transvaginal, CBC, CMP, lipase, urinalysis, HCG quantitative serum, urine microscopic, urine culture to evaluate.     Initial diagnoses lumbar strain, compression fracture, intra-abdominal injury, patient of pregnancy and UTI.      Of note, there is significant delays because of laboratory draws and redraws for this patient and palpitations was given labs completed.      Ultrasound shows a small subchorionic hemorrhage.  Otherwise, a normal IUP with no complication.  6 week IUP is identified.  Patient is made aware of these findings.    She has UTI.  She is prescribed Omnicef.    I discussed with the image her lumbar spine with early pregnancy and she is aware of this.  I think it is unlikely that she has a fracture.  She is healthy and she has a fairly low risk mechanism with no focal tenderness, just mild diffuse tenderness and a normal  neurologic examination.  She may have a mild compression fracture Treated symptomatically with Tylenol.  I don't think an x-ray is wise in the setting of her early pregnancy.  She'll return for pain or other concerns.      3:46 PM Patient reevaluated at bedside. Patient is resting comfortably. Discussed lab and imaging results with the patient. She agrees to discharge home with follow up to gynecology. She understands supportive care measures and return precautions. The patient will return for new or worsening symptoms and is stable at the time of discharge.    At this point she feels better.  She was also able to a bleed.  She was discharged in good condition.      DISPOSITION:  Patient will be discharged home in stable condition.      FOLLOW UP:  Your Physician  Varies    Schedule an appointment as soon as possible for a visit in 2 days        OUTPATIENT MEDICATIONS:  New Prescriptions    CEFDINIR (OMNICEF) 300 MG CAP    Take 1 Cap by mouth 2 times a day for 7 days.         FINAL IMPRESSION  1. Fall, initial encounter    2. Acute midline low back pain without sciatica    3. Acute UTI    4. Generalized abdominal pain    5. Less than 8 weeks gestation of pregnancy     6.  Situational anxiety    Karley ARCHULETA (Alee), am scribing for, and in the presence of, Nate Galeana M.D..  Electronically signed by: Karley Daniels), 9/14/2017  Nate ARCHULETA M.D. personally performed the services described in this documentation, as scribed by Karley Trujillo in my presence, and it is both accurate and complete.    The note accurately reflects work and decisions made by me.  Nate Galeana  9/14/2017  6:18 PM

## 2017-09-16 LAB
BACTERIA UR CULT: ABNORMAL
BACTERIA UR CULT: ABNORMAL
SIGNIFICANT IND 70042: ABNORMAL
SITE SITE: ABNORMAL
SOURCE SOURCE: ABNORMAL

## 2017-09-20 NOTE — ED NOTES
ED Positive Culture Follow-up/Notification Note:    Date: 9/20/17     Patient seen in the ED on 9/14/2017 for mechanical GLF.   1. Fall, initial encounter    2. Acute midline low back pain without sciatica    3. Acute UTI    4. Generalized abdominal pain    5. Less than 8 weeks gestation of pregnancy    6. Situational anxiety       Discharge Medication List as of 9/14/2017  3:50 PM      START taking these medications    Details   cefdinir (OMNICEF) 300 MG Cap Take 1 Cap by mouth 2 times a day for 7 days., Disp-20 Cap, R-0, Print Rx Paper             Allergies: Nkda [no known drug allergy]     Final cultures:   Results     Procedure Component Value Units Date/Time    URINE CULTURE(NEW) [[id number]]  (Abnormal) Collected:  09/14/17 1045    Order Status:  Completed Specimen:  Urine Updated:  09/16/17 0753     Significant Indicator POS (POS)     Source UR     Site --     Urine Culture Mixed skin chanda 10-50,000 cfu/mL (A)     Urine Culture -- (A)     Probable Gardnerella vaginalis  ,000 cfu/mL      URINALYSIS CULTURE, IF INDICATED [[id number]]  (Abnormal) Collected:  09/14/17 1045    Order Status:  Completed Specimen:  Blood Updated:  09/14/17 1154     Micro Urine Req Microscopic     Color Straw     Character Clear     Specific Gravity 1.005     Ph 7.0     Glucose Negative mg/dL      Ketones Trace (A) mg/dL      Protein Negative mg/dL      Bilirubin Negative     Urobilinogen, Urine Normal     Nitrite Negative     Leukocyte Esterase Large (A)     Occult Blood Negative     Culture Indicated Yes UA Culture           Plan:   Urine culture is positive for G. Vaginalis and mixed skin chanda in the setting of active pregnancy.  --Wet prep was not conducted.  -Pt was discharged on cefdinir x10 days (stop date 9/24).  -Left a msg for pt requesting immediate return correspondence.  -Recommend DC of cefdinir and starting metronidazole 500mg po bid x7 days; #14; 0 RF's; per MD Ramos protocol.  -Will send a letter relaying  positive culture results and and encourage immediate return correspondence with ED culture staff.     Jenny Desai, PharmD, BCPS

## 2018-03-22 ENCOUNTER — HOSPITAL ENCOUNTER (OUTPATIENT)
Facility: MEDICAL CENTER | Age: 28
End: 2018-03-22
Attending: SPECIALIST | Admitting: SPECIALIST
Payer: MEDICAID

## 2018-03-22 ENCOUNTER — HOSPITAL ENCOUNTER (EMERGENCY)
Facility: MEDICAL CENTER | Age: 28
End: 2018-03-22
Payer: MEDICAID

## 2018-03-22 VITALS
WEIGHT: 236 LBS | SYSTOLIC BLOOD PRESSURE: 119 MMHG | DIASTOLIC BLOOD PRESSURE: 70 MMHG | TEMPERATURE: 97.8 F | BODY MASS INDEX: 40.29 KG/M2 | HEART RATE: 74 BPM | HEIGHT: 64 IN

## 2018-03-22 LAB — FIBRONECTIN FETAL SPEC QL: NEGATIVE

## 2018-03-22 PROCEDURE — 82731 ASSAY OF FETAL FIBRONECTIN: CPT

## 2018-03-22 ASSESSMENT — PAIN SCALES - GENERAL: PAINLEVEL_OUTOF10: 4

## 2018-03-22 NOTE — PROGRESS NOTES
1347 Patient is a 27 year old , EDC 18 at 33 5/7 weeks gestation. Patient presents to LND complaining of decreased fetal movement and cramping. Patient denies any LOF and vaginal bleeding. Patient also states she went to her primary for cold like symptoms and fever. Patient was told it was a virus, to stay hydrated with plenty of rest. External monitors x2 applied/vitals taken. 4554 Patient states feeling fetal movement now, will continue to monitor. 1436 Dr. Chacon called/report given, order received for FFN/SVE, if negative patient patient may discharged to home. Patient is to follow up Dr. Dupree tomorrow for BPP. Report given to Carole FELICAINO.

## 2018-03-22 NOTE — PROGRESS NOTES
1440 - FFN collected, sent to lab.   1550 - FFN negative. SVE 1/thick. Call placed to Dr. Dupree.   1554 - Dr Dupree updated. Discharge order received, pt must follow up in office tomorrow for BPP.   1556 - Discharge instructions given to patient, including  labor instructions. Questions answered. Pt agrees. Pt advised to come back to L&D for any concerns.   1604 - Pt ambulated off unit in stable condition to home.

## 2018-04-13 ENCOUNTER — HOSPITAL ENCOUNTER (INPATIENT)
Facility: MEDICAL CENTER | Age: 28
LOS: 2 days | End: 2018-04-15
Attending: SPECIALIST | Admitting: SPECIALIST
Payer: MEDICAID

## 2018-04-13 DIAGNOSIS — R10.2 PELVIC PAIN: Primary | ICD-10-CM

## 2018-04-13 LAB
BASOPHILS # BLD AUTO: 0.6 % (ref 0–1.8)
BASOPHILS # BLD: 0.08 K/UL (ref 0–0.12)
EOSINOPHIL # BLD AUTO: 0.1 K/UL (ref 0–0.51)
EOSINOPHIL NFR BLD: 0.7 % (ref 0–6.9)
ERYTHROCYTE [DISTWIDTH] IN BLOOD BY AUTOMATED COUNT: 46.8 FL (ref 35.9–50)
HCT VFR BLD AUTO: 37.2 % (ref 37–47)
HGB BLD-MCNC: 11.6 G/DL (ref 12–16)
HOLDING TUBE BB 8507: NORMAL
IMM GRANULOCYTES # BLD AUTO: 0.1 K/UL (ref 0–0.11)
IMM GRANULOCYTES NFR BLD AUTO: 0.7 % (ref 0–0.9)
LYMPHOCYTES # BLD AUTO: 3.21 K/UL (ref 1–4.8)
LYMPHOCYTES NFR BLD: 23.6 % (ref 22–41)
MCH RBC QN AUTO: 30.4 PG (ref 27–33)
MCHC RBC AUTO-ENTMCNC: 31.2 G/DL (ref 33.6–35)
MCV RBC AUTO: 97.6 FL (ref 81.4–97.8)
MONOCYTES # BLD AUTO: 0.83 K/UL (ref 0–0.85)
MONOCYTES NFR BLD AUTO: 6.1 % (ref 0–13.4)
NEUTROPHILS # BLD AUTO: 9.29 K/UL (ref 2–7.15)
NEUTROPHILS NFR BLD: 68.3 % (ref 44–72)
NRBC # BLD AUTO: 0 K/UL
NRBC BLD-RTO: 0 /100 WBC
PLATELET # BLD AUTO: 309 K/UL (ref 164–446)
PMV BLD AUTO: 12.2 FL (ref 9–12.9)
RBC # BLD AUTO: 3.81 M/UL (ref 4.2–5.4)
WBC # BLD AUTO: 13.6 K/UL (ref 4.8–10.8)

## 2018-04-13 PROCEDURE — 10907ZC DRAINAGE OF AMNIOTIC FLUID, THERAPEUTIC FROM PRODUCTS OF CONCEPTION, VIA NATURAL OR ARTIFICIAL OPENING: ICD-10-PCS | Performed by: SPECIALIST

## 2018-04-13 PROCEDURE — 304965 HCHG RECOVERY SERVICES

## 2018-04-13 PROCEDURE — 700111 HCHG RX REV CODE 636 W/ 250 OVERRIDE (IP): Performed by: SPECIALIST

## 2018-04-13 PROCEDURE — A9270 NON-COVERED ITEM OR SERVICE: HCPCS | Performed by: SPECIALIST

## 2018-04-13 PROCEDURE — 700105 HCHG RX REV CODE 258

## 2018-04-13 PROCEDURE — 59409 OBSTETRICAL CARE: CPT

## 2018-04-13 PROCEDURE — 700112 HCHG RX REV CODE 229: Performed by: SPECIALIST

## 2018-04-13 PROCEDURE — 36415 COLL VENOUS BLD VENIPUNCTURE: CPT

## 2018-04-13 PROCEDURE — 85025 COMPLETE CBC W/AUTO DIFF WBC: CPT

## 2018-04-13 PROCEDURE — 770002 HCHG ROOM/CARE - OB PRIVATE (112)

## 2018-04-13 PROCEDURE — 700102 HCHG RX REV CODE 250 W/ 637 OVERRIDE(OP): Performed by: SPECIALIST

## 2018-04-13 PROCEDURE — 700111 HCHG RX REV CODE 636 W/ 250 OVERRIDE (IP)

## 2018-04-13 RX ORDER — ONDANSETRON 4 MG/1
4 TABLET, ORALLY DISINTEGRATING ORAL EVERY 6 HOURS PRN
Status: DISCONTINUED | OUTPATIENT
Start: 2018-04-13 | End: 2018-04-15 | Stop reason: HOSPADM

## 2018-04-13 RX ORDER — DOCUSATE SODIUM 100 MG/1
100 CAPSULE, LIQUID FILLED ORAL 2 TIMES DAILY PRN
Status: DISCONTINUED | OUTPATIENT
Start: 2018-04-13 | End: 2018-04-15 | Stop reason: HOSPADM

## 2018-04-13 RX ORDER — MISOPROSTOL 200 UG/1
800 TABLET ORAL
Status: DISCONTINUED | OUTPATIENT
Start: 2018-04-13 | End: 2018-04-13 | Stop reason: HOSPADM

## 2018-04-13 RX ORDER — SODIUM CHLORIDE, SODIUM LACTATE, POTASSIUM CHLORIDE, CALCIUM CHLORIDE 600; 310; 30; 20 MG/100ML; MG/100ML; MG/100ML; MG/100ML
INJECTION, SOLUTION INTRAVENOUS
Status: COMPLETED
Start: 2018-04-13 | End: 2018-04-13

## 2018-04-13 RX ORDER — SODIUM CHLORIDE, SODIUM LACTATE, POTASSIUM CHLORIDE, CALCIUM CHLORIDE 600; 310; 30; 20 MG/100ML; MG/100ML; MG/100ML; MG/100ML
INJECTION, SOLUTION INTRAVENOUS CONTINUOUS
Status: DISCONTINUED | OUTPATIENT
Start: 2018-04-13 | End: 2018-04-13 | Stop reason: HOSPADM

## 2018-04-13 RX ORDER — SODIUM CHLORIDE, SODIUM LACTATE, POTASSIUM CHLORIDE, CALCIUM CHLORIDE 600; 310; 30; 20 MG/100ML; MG/100ML; MG/100ML; MG/100ML
INJECTION, SOLUTION INTRAVENOUS PRN
Status: DISCONTINUED | OUTPATIENT
Start: 2018-04-13 | End: 2018-04-15 | Stop reason: HOSPADM

## 2018-04-13 RX ORDER — OXYCODONE AND ACETAMINOPHEN 10; 325 MG/1; MG/1
1 TABLET ORAL EVERY 4 HOURS PRN
Status: DISCONTINUED | OUTPATIENT
Start: 2018-04-13 | End: 2018-04-15 | Stop reason: HOSPADM

## 2018-04-13 RX ORDER — ONDANSETRON 2 MG/ML
4 INJECTION INTRAMUSCULAR; INTRAVENOUS EVERY 6 HOURS PRN
Status: DISCONTINUED | OUTPATIENT
Start: 2018-04-13 | End: 2018-04-15 | Stop reason: HOSPADM

## 2018-04-13 RX ORDER — ALUMINA, MAGNESIA, AND SIMETHICONE 2400; 2400; 240 MG/30ML; MG/30ML; MG/30ML
30 SUSPENSION ORAL EVERY 6 HOURS PRN
Status: DISCONTINUED | OUTPATIENT
Start: 2018-04-13 | End: 2018-04-13 | Stop reason: HOSPADM

## 2018-04-13 RX ORDER — SODIUM CHLORIDE, SODIUM LACTATE, POTASSIUM CHLORIDE, CALCIUM CHLORIDE 600; 310; 30; 20 MG/100ML; MG/100ML; MG/100ML; MG/100ML
INJECTION, SOLUTION INTRAVENOUS
Status: DISCONTINUED
Start: 2018-04-13 | End: 2018-04-13

## 2018-04-13 RX ORDER — METHYLERGONOVINE MALEATE 0.2 MG/ML
0.2 INJECTION INTRAVENOUS
Status: DISCONTINUED | OUTPATIENT
Start: 2018-04-13 | End: 2018-04-15 | Stop reason: HOSPADM

## 2018-04-13 RX ORDER — ACETAMINOPHEN 325 MG/1
325 TABLET ORAL EVERY 4 HOURS PRN
Status: DISCONTINUED | OUTPATIENT
Start: 2018-04-13 | End: 2018-04-15 | Stop reason: HOSPADM

## 2018-04-13 RX ORDER — OXYTOCIN 10 [USP'U]/ML
10 INJECTION, SOLUTION INTRAMUSCULAR; INTRAVENOUS
Status: DISCONTINUED | OUTPATIENT
Start: 2018-04-13 | End: 2018-04-13 | Stop reason: HOSPADM

## 2018-04-13 RX ORDER — OXYCODONE HYDROCHLORIDE AND ACETAMINOPHEN 5; 325 MG/1; MG/1
1 TABLET ORAL EVERY 4 HOURS PRN
Status: DISCONTINUED | OUTPATIENT
Start: 2018-04-13 | End: 2018-04-15 | Stop reason: HOSPADM

## 2018-04-13 RX ORDER — IBUPROFEN 600 MG/1
600 TABLET ORAL EVERY 6 HOURS PRN
Status: DISCONTINUED | OUTPATIENT
Start: 2018-04-13 | End: 2018-04-15 | Stop reason: HOSPADM

## 2018-04-13 RX ADMIN — SODIUM CHLORIDE, SODIUM LACTATE, POTASSIUM CHLORIDE, CALCIUM CHLORIDE: 600; 310; 30; 20 INJECTION, SOLUTION INTRAVENOUS at 04:00

## 2018-04-13 RX ADMIN — IBUPROFEN 600 MG: 600 TABLET, FILM COATED ORAL at 11:31

## 2018-04-13 RX ADMIN — IBUPROFEN 600 MG: 600 TABLET, FILM COATED ORAL at 05:05

## 2018-04-13 RX ADMIN — OXYCODONE HYDROCHLORIDE AND ACETAMINOPHEN 1 TABLET: 5; 325 TABLET ORAL at 19:05

## 2018-04-13 RX ADMIN — Medication 2000 ML/HR: at 04:30

## 2018-04-13 RX ADMIN — Medication 125 ML/HR: at 05:53

## 2018-04-13 RX ADMIN — SODIUM CHLORIDE, POTASSIUM CHLORIDE, SODIUM LACTATE AND CALCIUM CHLORIDE: 600; 310; 30; 20 INJECTION, SOLUTION INTRAVENOUS at 04:00

## 2018-04-13 RX ADMIN — OXYCODONE HYDROCHLORIDE AND ACETAMINOPHEN 1 TABLET: 5; 325 TABLET ORAL at 11:32

## 2018-04-13 RX ADMIN — DOCUSATE SODIUM 100 MG: 100 CAPSULE ORAL at 19:05

## 2018-04-13 RX ADMIN — OXYCODONE HYDROCHLORIDE AND ACETAMINOPHEN 1 TABLET: 5; 325 TABLET ORAL at 23:50

## 2018-04-13 RX ADMIN — IBUPROFEN 600 MG: 600 TABLET, FILM COATED ORAL at 19:05

## 2018-04-13 RX ADMIN — OXYCODONE HYDROCHLORIDE AND ACETAMINOPHEN 1 TABLET: 5; 325 TABLET ORAL at 05:04

## 2018-04-13 ASSESSMENT — PAIN SCALES - GENERAL
PAINLEVEL_OUTOF10: 3
PAINLEVEL_OUTOF10: 0
PAINLEVEL_OUTOF10: 4
PAINLEVEL_OUTOF10: 0
PAINLEVEL_OUTOF10: 2
PAINLEVEL_OUTOF10: 5
PAINLEVEL_OUTOF10: 4

## 2018-04-13 ASSESSMENT — LIFESTYLE VARIABLES
ALCOHOL_USE: NO
EVER_SMOKED: NEVER
DO YOU DRINK ALCOHOL: NO

## 2018-04-13 ASSESSMENT — COPD QUESTIONNAIRES
COPD SCREENING SCORE: 0
HAVE YOU SMOKED AT LEAST 100 CIGARETTES IN YOUR ENTIRE LIFE: NO/DON'T KNOW
DO YOU EVER COUGH UP ANY MUCUS OR PHLEGM?: NO/ONLY WITH OCCASIONAL COLDS OR INFECTIONS
DURING THE PAST 4 WEEKS HOW MUCH DID YOU FEEL SHORT OF BREATH: NONE/LITTLE OF THE TIME

## 2018-04-13 ASSESSMENT — PATIENT HEALTH QUESTIONNAIRE - PHQ9
2. FEELING DOWN, DEPRESSED, IRRITABLE, OR HOPELESS: NOT AT ALL
SUM OF ALL RESPONSES TO PHQ9 QUESTIONS 1 AND 2: 0
1. LITTLE INTEREST OR PLEASURE IN DOING THINGS: NOT AT ALL

## 2018-04-13 NOTE — CARE PLAN
Problem: Infection  Goal: Will remain free from infection  Outcome: PROGRESSING AS EXPECTED  WNL

## 2018-04-13 NOTE — PROGRESS NOTES
0330: pt to labor and delivery, pt c/o uc's. efm and toco applied.  Vss.  sve 5-6 cm, call to dr. Krueger. Orders to admit.  0410: dr. Krueger at , arom, clear fluid.  sve 9 cm.  0430: sve complete and pushing.  Dr. Krueger at , delivery of viable boy, apgars 8/9  0435: del of placenta spontaneous/intact  0505: meds given for pain, pt denies needs  0700: report to April rn

## 2018-04-13 NOTE — DELIVERY NOTE
DATE OF SERVICE:  2018    The patient is a very pleasant 27-year-old multipara (on admission  4,   para 3) with prenatal care with Dr. Dupree during this pregnancy and she is   today 36 weeks and 4 days gestation and she presented to labor and delivery   early this morning with complaints of frequent and painful uterine   contractions and on presentation to labor and delivery, her cervix was found   to be about 5 cm dilated.  According to records, her recent vaginal culture   for group B strep came back negative for group B strep.  Her labor progressed.    Fetal heart tracing was category 1 and continued to be category 1.    Artificial rupture of membranes was performed and clear amniotic fluid was   observed.  At about 04:30 this morning, Friday, 2018, she went on to   have a normal spontaneous vaginal delivery of a live, almost term male    with Apgar scores of approximately 8 and 9 at 1 and 5 minutes respectively   and a  weight, which has not yet been measured, but which is estimated   to be approximately 2.5 kg.  Baby was delivered over an intact perineum under   no anesthesia.  Placenta was simply delivered and examined and found to be   complete.  Examination of the vulva and vagina revealed that there were no   lacerations.  Bleeding appeared to be minimal.  The estimated blood loss was   less than 100 mL.  Addendum: The  weight was 2,435 grams./       ____________________________________     YAIR ANTONIO MD    MED / [facility]    DD:  2018 04:50:21  DT:  2018 04:59:31    D#:  [id number]  Job#:  [id number]    cc: PAIGE DUPREE MD

## 2018-04-13 NOTE — DELIVERY NOTE
The patient is a very pleasant 27 year old multipara R (on admission. 3 with 3 previous vaginal deliveries) who is today 36 weeks and 4 days gestation. She has had her prenatal care with this pregnancy with Dr. Dupree. She presented to labor and delivery with complaints of frequent and painful uterine contractions and by the nurse's exam on presentation to labor and delivery her cervix was found to be about 5 cm dilated. Her labor was progressing. It was noted that her recent vaginal culture for group B strep and compact negative for group B strep. Her labor continued to progress. The fetal heart tracing continued to be category 1. At about 4:30 this morning, 2018 she went on to have a normal spontaneous vaginal delivery of a live almost term male  with Apgar scores of approximately 8 and 9 at one and five minutes respectively and a  weight which has not yet been measured but which I estimated to be approximately 2.5 kg. Baby was delivered over an intact perineum under no anesthesia. The placenta was simply delivered and examined and found be complete. Examination of the vulva and vaginal mucosa revealed that there were no lacerations. Bleeding appeared to be minimal. The estimated blood loss was less than 100 mL.  Binh Krueger M.D.

## 2018-04-13 NOTE — PROGRESS NOTES
Report and pt received into 310.  Ambulating and shown pericare.  IV heplock.  ID bands verified with April RN and mother.  Denies pain at this time.  Security, medication and plan of care discussed with pt.  Assessment done.

## 2018-04-13 NOTE — CARE PLAN
Problem: Knowledge Deficit  Goal: Knowledge of disease process/condition, treatment plan, diagnostic tests, and medications will improve  Outcome: PROGRESSING AS EXPECTED  Discussed medication, security and plan of care

## 2018-04-14 LAB
ALBUMIN SERPL BCP-MCNC: 2.6 G/DL (ref 3.2–4.9)
ALBUMIN/GLOB SERPL: 0.8 G/DL
ALP SERPL-CCNC: 139 U/L (ref 30–99)
ALT SERPL-CCNC: 12 U/L (ref 2–50)
ANION GAP SERPL CALC-SCNC: 8 MMOL/L (ref 0–11.9)
AST SERPL-CCNC: 13 U/L (ref 12–45)
BILIRUB SERPL-MCNC: 0.3 MG/DL (ref 0.1–1.5)
BUN SERPL-MCNC: 8 MG/DL (ref 8–22)
CALCIUM SERPL-MCNC: 8.2 MG/DL (ref 8.5–10.5)
CHLORIDE SERPL-SCNC: 108 MMOL/L (ref 96–112)
CO2 SERPL-SCNC: 21 MMOL/L (ref 20–33)
CREAT SERPL-MCNC: 0.65 MG/DL (ref 0.5–1.4)
ERYTHROCYTE [DISTWIDTH] IN BLOOD BY AUTOMATED COUNT: 45.2 FL (ref 35.9–50)
GLOBULIN SER CALC-MCNC: 3.1 G/DL (ref 1.9–3.5)
GLUCOSE SERPL-MCNC: 68 MG/DL (ref 65–99)
HCT VFR BLD AUTO: 27.9 % (ref 37–47)
HGB BLD-MCNC: 9 G/DL (ref 12–16)
MCH RBC QN AUTO: 30.3 PG (ref 27–33)
MCHC RBC AUTO-ENTMCNC: 31.9 G/DL (ref 33.6–35)
MCV RBC AUTO: 94.9 FL (ref 81.4–97.8)
PLATELET # BLD AUTO: 230 K/UL (ref 164–446)
PMV BLD AUTO: 12 FL (ref 9–12.9)
POTASSIUM SERPL-SCNC: 3.8 MMOL/L (ref 3.6–5.5)
PROT SERPL-MCNC: 5.7 G/DL (ref 6–8.2)
RBC # BLD AUTO: 2.94 M/UL (ref 4.2–5.4)
SODIUM SERPL-SCNC: 137 MMOL/L (ref 135–145)
WBC # BLD AUTO: 8.3 K/UL (ref 4.8–10.8)

## 2018-04-14 PROCEDURE — 770002 HCHG ROOM/CARE - OB PRIVATE (112)

## 2018-04-14 PROCEDURE — 85027 COMPLETE CBC AUTOMATED: CPT

## 2018-04-14 PROCEDURE — 80053 COMPREHEN METABOLIC PANEL: CPT

## 2018-04-14 PROCEDURE — A9270 NON-COVERED ITEM OR SERVICE: HCPCS | Performed by: SPECIALIST

## 2018-04-14 PROCEDURE — 36415 COLL VENOUS BLD VENIPUNCTURE: CPT

## 2018-04-14 PROCEDURE — 700102 HCHG RX REV CODE 250 W/ 637 OVERRIDE(OP): Performed by: SPECIALIST

## 2018-04-14 PROCEDURE — 700112 HCHG RX REV CODE 229: Performed by: SPECIALIST

## 2018-04-14 RX ORDER — IBUPROFEN 600 MG/1
600 TABLET ORAL EVERY 6 HOURS PRN
Qty: 30 TAB | Refills: 0 | Status: SHIPPED | OUTPATIENT
Start: 2018-04-14 | End: 2018-09-20

## 2018-04-14 RX ORDER — FERROUS SULFATE 325(65) MG
325 TABLET ORAL DAILY
Qty: 30 TAB | Refills: 0 | Status: SHIPPED | OUTPATIENT
Start: 2018-04-14 | End: 2018-09-20

## 2018-04-14 RX ORDER — OXYCODONE HYDROCHLORIDE AND ACETAMINOPHEN 5; 325 MG/1; MG/1
1 TABLET ORAL EVERY 6 HOURS PRN
Qty: 28 TAB | Refills: 0 | Status: SHIPPED | OUTPATIENT
Start: 2018-04-14 | End: 2018-04-21

## 2018-04-14 RX ORDER — SENNA AND DOCUSATE SODIUM 50; 8.6 MG/1; MG/1
1 TABLET, FILM COATED ORAL 2 TIMES DAILY
Qty: 60 TAB | Refills: 0 | Status: SHIPPED | OUTPATIENT
Start: 2018-04-14 | End: 2018-09-20

## 2018-04-14 RX ADMIN — DOCUSATE SODIUM 100 MG: 100 CAPSULE ORAL at 09:25

## 2018-04-14 RX ADMIN — OXYCODONE HYDROCHLORIDE AND ACETAMINOPHEN 1 TABLET: 5; 325 TABLET ORAL at 18:23

## 2018-04-14 RX ADMIN — IBUPROFEN 600 MG: 600 TABLET, FILM COATED ORAL at 09:25

## 2018-04-14 RX ADMIN — OXYCODONE HYDROCHLORIDE AND ACETAMINOPHEN 1 TABLET: 5; 325 TABLET ORAL at 22:36

## 2018-04-14 RX ADMIN — DOCUSATE SODIUM 100 MG: 100 CAPSULE ORAL at 22:36

## 2018-04-14 RX ADMIN — IBUPROFEN 600 MG: 600 TABLET, FILM COATED ORAL at 02:42

## 2018-04-14 RX ADMIN — OXYCODONE HYDROCHLORIDE AND ACETAMINOPHEN 1 TABLET: 5; 325 TABLET ORAL at 09:25

## 2018-04-14 RX ADMIN — IBUPROFEN 600 MG: 600 TABLET, FILM COATED ORAL at 18:23

## 2018-04-14 ASSESSMENT — PAIN SCALES - GENERAL
PAINLEVEL_OUTOF10: 0
PAINLEVEL_OUTOF10: 6
PAINLEVEL_OUTOF10: 3
PAINLEVEL_OUTOF10: 2
PAINLEVEL_OUTOF10: 2
PAINLEVEL_OUTOF10: 6
PAINLEVEL_OUTOF10: 0
PAINLEVEL_OUTOF10: 2
PAINLEVEL_OUTOF10: 6

## 2018-04-14 NOTE — CARE PLAN
Problem: Altered physiologic condition related to immediate post-delivery state and potential for bleeding/hemorrhage  Goal: Patient physiologically stable as evidenced by normal lochia, palpable uterine involution and vital signs within normal limits  Outcome: PROGRESSING AS EXPECTED  VSS. Fundus firm with light lochia. Patient educated on when to pull emergency call light.     Problem: Potential knowledge deficit related to lack of understanding of self and  care  Goal: Patient will verbalize understanding of self and infant care  Outcome: PROGRESSING AS EXPECTED  Education given. Patient declines having any questions.

## 2018-04-14 NOTE — PROGRESS NOTES
POST PARTUM DAY # 1  The patient complains of cramping pain.   She says that she feels fine otherwise and she says that she has no other problems or complaints.   She says that she would like to go home today.   The patient's vital signs are stable and she is afebrile.   She appears well developed and well nourished and relaxed and alert and comfortable and in no apparent distress.   Labs: the patient's hemoglobin went from 11.6 grams per deciliter antepartum to 9.0 grams per deciliter post partum.   We will discharge home today with prescriptions for Percocet and ibuprofen and iron sulfate and stool softener.  I asked the patient to follow up with Dr. Dupree in about 6 weeks for postpartum visit and I asked her to call or contact myself or Dr. Dupree at any time should she ever have any problems or questions or complaints and she said that she would do so.   Binh Krueger M.D.

## 2018-04-14 NOTE — PROGRESS NOTES
Received bedside report from JODIE Sanches. Patient in bed, with complaints of pain. Patient medicated, see MAR. Patient would like to received medication when available. Whiteboards updated, POC discussed. Call light within reach. Patient encouraged to call with any needs and or concerns.

## 2018-04-14 NOTE — PROGRESS NOTES
Assumed care from Joyce HATFIELD Rn. Patient denies prn pain medication upon assessment. Observed latch of infant. Patient has family in room for assistance.

## 2018-04-15 VITALS
DIASTOLIC BLOOD PRESSURE: 57 MMHG | HEIGHT: 63 IN | RESPIRATION RATE: 16 BRPM | OXYGEN SATURATION: 96 % | TEMPERATURE: 97.2 F | BODY MASS INDEX: 41.64 KG/M2 | HEART RATE: 59 BPM | WEIGHT: 235 LBS | SYSTOLIC BLOOD PRESSURE: 107 MMHG

## 2018-04-15 PROCEDURE — A9270 NON-COVERED ITEM OR SERVICE: HCPCS | Performed by: SPECIALIST

## 2018-04-15 PROCEDURE — 700102 HCHG RX REV CODE 250 W/ 637 OVERRIDE(OP): Performed by: SPECIALIST

## 2018-04-15 RX ADMIN — IBUPROFEN 600 MG: 600 TABLET, FILM COATED ORAL at 03:56

## 2018-04-15 RX ADMIN — OXYCODONE HYDROCHLORIDE AND ACETAMINOPHEN 1 TABLET: 5; 325 TABLET ORAL at 03:57

## 2018-04-15 RX ADMIN — OXYCODONE HYDROCHLORIDE AND ACETAMINOPHEN 1 TABLET: 5; 325 TABLET ORAL at 08:44

## 2018-04-15 ASSESSMENT — PAIN SCALES - GENERAL
PAINLEVEL_OUTOF10: 3
PAINLEVEL_OUTOF10: 2
PAINLEVEL_OUTOF10: 4
PAINLEVEL_OUTOF10: 3
PAINLEVEL_OUTOF10: 5
PAINLEVEL_OUTOF10: 4
PAINLEVEL_OUTOF10: 4

## 2018-04-15 NOTE — PROGRESS NOTES
0700-Report received at bedside from Poli RN, assumed care of patient.  Encouraged to call with needs, denies at this time.   0844-Assessment completed, fundus is firm, lochia light and vital signs within defined parameters. Patient is ambulating and voiding without difficulty. Bonding well with infant, breastfeeding and pumping independently.  Discussed with patient pain medication plan, patient requesting to be medicated PRN, will call for medication.  Plan of care discussed, patient verbalized understanding. Hourly rounding implemented, call light within reach.

## 2018-04-15 NOTE — PROGRESS NOTES
Received report from JODIE Moore. Patient in bed, pumping. Patient encouraged to continue to pump q 3 hrs. Patient recently received pain medication but would like to receive pain medications when available. POC discussed, whiteboards updated. Call light within reach. Patient to call with any needs and or concerns.

## 2018-04-15 NOTE — DISCHARGE SUMMARY
DATE OF ADMISSION:  2018    DATE OF DISCHARGE:  04/15/2018    ADMISSION DIAGNOSES:  1.  Intrauterine pregnancy at 36 weeks and 4 days gestation.  2.  Labor.    DISCHARGE DIAGNOSES:  1.  Intrauterine pregnancy at 36 weeks and 4 days gestation.  2.  Labor.  3.  Live almost term male  with Apgar scores of approximately 8 and 9   at 1 and 5 minutes respectively and a  weight of 2435 g.    PROCEDURE:  Normal spontaneous vaginal delivery.    COMPLICATIONS:  None.    HOSPITAL COURSE:  The patient had her prenatal care with Dr. Dupree during the   course of this pregnancy.  She presented to labor and delivery very early in   the morning on Friday, 2018 and on presentation to labor and delivery,   her cervix was found to be about 5-6 cm dilated.  Her labor progressed   quickly.  It was at about 4:35 a.m., Friday, 2018, and that she went on   to have a normal spontaneous delivery of a live, almost term male  with   Apgar scores of approximately 8 and 9 at 1 and 5 minutes respectively and a    weight of 2435 g.  Baby was delivered over an intact perineum under no   anesthesia.  Placenta was simply delivered and examined and found to be   complete.  Examination of the vulva and vagina revealed that there were no   vulvar lacerations.  The bleeding appeared to be minimal.  The estimated blood   loss was approximately 100 mL.  The patient's postpartum course was   uncomplicated.  The patient's antepartum hemoglobin was 11.6 g/dL and her   postpartum hemoglobin on postpartum day #1, was 9.0 gm/dL.  Today, ,   4/15/2018, postpartum day #2, the patient says that she is ambulating,   urinating, and tolerating regular diet.  She says her bleeding was minimal and   she said she would like to go home today.  The patient is being discharged   home today with prescriptions for Percocet, ibuprofen, iron sulfate, and stool   softeners.  I instructed her to continue to take her prenatal  vitamins daily.    She said that she would do so.  I asked her to follow up with Dr. Dupree in   about 6 weeks for postpartum visit.  I asked her to call or contact me at any   time should she ever have any problems or questions or complaints and she said   that she would do so.       ____________________________________     YAIR ANTONIO MD    MED / [facility]    DD:  04/15/2018 11:18:13  DT:  04/15/2018 12:18:24    D#:  [id number]  Job#:  [id number]    cc: PAIGE DUPREE MD

## 2018-04-15 NOTE — PROGRESS NOTES
Discharge instructions reviewed and signed, copy given to patient and placed in chart. F/U appointment discussed. Patient verbalizes understanding.

## 2018-04-15 NOTE — CONSULTS
Visited with mom regarding breastfeeding progress. Mom feels baby is latching well, confirmed by RN's. Right nipple has a bruised compression stripe. Mom states that was from an early feeding and latch has improved since then. Mom is encouraged to call for lactation assistance at the next feeding. Mom is supplementing with EBM and Similac per LPI protocols. Mom states she does not have WIC and can't afford a rental at this time. Will see if manual pump from [facility] is an option for discharge.

## 2018-04-15 NOTE — PROGRESS NOTES
POST PARTUM DAY # 2  The patient says that she feels fine and that she has no problems or complaints.   The patient's vital signs are stable and she is afebrile.   She appears well developed and well nourished and relaxed and alert and comfortable and in no apparent distress.   Will discharge home today.   Rx's for percocet and ibuprofen and iron sulfate and stool softener.   Follow up with Dr. Dupree in about 6 weeks for a post partum visit.   Binh Krueger M.D.

## 2018-04-15 NOTE — CARE PLAN
Problem: Altered physiologic condition related to immediate post-delivery state and potential for bleeding/hemorrhage  Goal: Patient physiologically stable as evidenced by normal lochia, palpable uterine involution and vital signs within normal limits  Outcome: PROGRESSING AS EXPECTED  VSS. Fundus firm with scant lochia. Patient declines any dizziness and or heavy bleeding.     Problem: Alteration in comfort related to episiotomy, vaginal repair and/or after birth pains  Goal: Patient is able to ambulate, care for self and infant  Outcome: PROGRESSING AS EXPECTED  Patient receiving percocet and motrin for pain. Ambulating, caring for self and infant.

## 2018-09-13 NOTE — H&P
DATE OF SCHEDULED SURGERY:  2018    REASON FOR SURGERY:  Menometrorrhagia, dysmenorrhea, uterovaginal prolapse,   type 2 stress urinary incontinence demonstrated by urodynamic studies,   dyspareunia, which she attributes to her uterovaginal prolapse.    HISTORY OF PRESENT ILLNESS:  This is a 28-year-old  4, para 4, negative   urine pregnancy test on 2018.  She states that she has exhausted all   conservative measures, now interested in proceeding forward with attempted   definitive surgical correction of her longstanding history of abnormal uterine   bleeding, dysmenorrhea, pelvic pain, now with uterovaginal prolapse after the   delivery of her fourth child, failing conservative management, pelvic floor   exercise therapy, declining pessary therapy, now interested in proceeding   forward with laparoscopic-assisted vaginal hysterectomy, bilateral   salpingectomy.  She is interested in proceeding with ovarian conservation,   anterior and posterior vaginal repair, enterocele repair, perineoplasty,   sacrospinous vault suspension, transobturator tape midurethral sling   procedure.  Risks, benefits, alternatives of all portions of the surgery were   addressed with the patient in detail over several visits.  She has asked   appropriate questions, signed the appropriate consents and is wishing to   proceed forward with surgery as planned.    PAST MEDICAL HISTORY:  Cholestasis, migraine headaches.    PAST SURGICAL HISTORY:  Cholecystectomy in 2010, gastric sleeve bypass in   2016.    OBSTETRICAL HISTORY:  In , ,  and , the patient had   spontaneous vaginal deliveries.    GYNECOLOGIC HISTORY:  The patient does report long-standing history of   abnormal uterine bleeding in between pregnancies and now since the delivery of   her child, has had persistent heavy vaginal bleeding, passage of large clots,   significant dysmenorrhea, uterovaginal prolapse, urinary incontinence   requiring daily  pad therapy since the delivery of her third child, is now   interested in proceeding forward with definitive surgical correction.  She   does understand the limitations of surgery in addressing her pelvic pain,   dyspareunia.  These symptoms may be unimproved or actually worsened with the   surgery as described above and even despite these limitations, she wishes to   proceed forward with the surgery nonetheless.    FAMILY HISTORY:  Noncontributory.    REVIEW OF SYSTEMS:  Remainder of review of systems unremarkable.    SOCIAL HISTORY:  She denies use of any alcohol, tobacco, or recreational drug   use.    MEDICATIONS:  None.    ALLERGIES:  No known drug allergies.    PHYSICAL EXAMINATION:  VITAL SIGNS:  She is afebrile, hemodynamically stable.  VITAL SIGNS:  Can be seen on the electronic medical record.  HEART:  Regular rate and rhythm.  CHEST:  Clear to auscultation bilaterally.  ABDOMEN:  Soft, moderately obese, nontender.  PELVIC:  Shows grade II anterior, posterior and apical vaginal relaxation with   tenderness to palpation at the uterine fundus.  No adnexal masses or   tenderness to palpation were appreciated in either adnexal region.  Bimanual   exam was difficult and limited secondary to patient's increased body mass   index.  EXTREMITIES:  Nontender.  GENITOURINARY:  Pelvic exam shows normal external female genitalia.  Grade II   anterior, posterior and uterine relaxation.  EXTREMITIES:  Nontender.    LABORATORY DATA:  Urodynamic studies did demonstrate and confirmed type 2   stress urinary incontinence.  Endometrial biopsy shows no evidence of   hyperplasia or malignancy.  Pap smear was within normal limits.  Urine   pregnancy test was negative on 2018.    ASSESSMENT AND PLAN:  A 28-year-old  4, para 4, negative urine   pregnancy test on 2018 with longstanding history of menometrorrhagia,   dysmenorrhea, pelvic pain, dyspareunia, symptomatic pelvic floor relaxation,   urinary  incontinence requiring daily pad therapy, now interested in proceeding   forward with attempted definitive surgical correction as described above.    Risks, benefits and alternatives have been addressed.  She has asked   appropriate questions, signed the appropriate consent and is wishing to   proceed forward with the scheduled surgery on 09/25/2018, even understanding   limitations of surgery.       ____________________________________     MD ERNESTO BERMEO / [facility]    DD:  09/13/2018 08:27:49  DT:  09/13/2018 09:23:53    D#:  [id number]  Job#:  [id number]

## 2018-09-20 DIAGNOSIS — Z01.812 PRE-OPERATIVE LABORATORY EXAMINATION: ICD-10-CM

## 2018-09-20 DIAGNOSIS — Z01.810 PRE-OPERATIVE CARDIOVASCULAR EXAMINATION: ICD-10-CM

## 2018-09-20 LAB
ANION GAP SERPL CALC-SCNC: 7 MMOL/L (ref 0–11.9)
BUN SERPL-MCNC: 12 MG/DL (ref 8–22)
CALCIUM SERPL-MCNC: 9.1 MG/DL (ref 8.5–10.5)
CHLORIDE SERPL-SCNC: 106 MMOL/L (ref 96–112)
CO2 SERPL-SCNC: 22 MMOL/L (ref 20–33)
CREAT SERPL-MCNC: 0.85 MG/DL (ref 0.5–1.4)
EKG IMPRESSION: NORMAL
ERYTHROCYTE [DISTWIDTH] IN BLOOD BY AUTOMATED COUNT: 49.8 FL (ref 35.9–50)
GLUCOSE SERPL-MCNC: 82 MG/DL (ref 65–99)
HCT VFR BLD AUTO: 32 % (ref 37–47)
HGB BLD-MCNC: 10.2 G/DL (ref 12–16)
MCH RBC QN AUTO: 28.7 PG (ref 27–33)
MCHC RBC AUTO-ENTMCNC: 31.9 G/DL (ref 33.6–35)
MCV RBC AUTO: 89.9 FL (ref 81.4–97.8)
PLATELET # BLD AUTO: 328 K/UL (ref 164–446)
PMV BLD AUTO: 10.6 FL (ref 9–12.9)
POTASSIUM SERPL-SCNC: 3.8 MMOL/L (ref 3.6–5.5)
RBC # BLD AUTO: 3.56 M/UL (ref 4.2–5.4)
SODIUM SERPL-SCNC: 135 MMOL/L (ref 135–145)
WBC # BLD AUTO: 7.3 K/UL (ref 4.8–10.8)

## 2018-09-20 PROCEDURE — 93005 ELECTROCARDIOGRAM TRACING: CPT

## 2018-09-20 PROCEDURE — 85027 COMPLETE CBC AUTOMATED: CPT

## 2018-09-20 PROCEDURE — 80048 BASIC METABOLIC PNL TOTAL CA: CPT

## 2018-09-20 PROCEDURE — 93010 ELECTROCARDIOGRAM REPORT: CPT | Performed by: INTERNAL MEDICINE

## 2018-09-20 PROCEDURE — 36415 COLL VENOUS BLD VENIPUNCTURE: CPT

## 2018-09-25 ENCOUNTER — HOSPITAL ENCOUNTER (OUTPATIENT)
Facility: MEDICAL CENTER | Age: 28
End: 2018-09-25
Attending: SPECIALIST | Admitting: SPECIALIST
Payer: MEDICAID

## 2018-09-25 VITALS
HEART RATE: 79 BPM | WEIGHT: 253.53 LBS | OXYGEN SATURATION: 99 % | BODY MASS INDEX: 44.92 KG/M2 | HEIGHT: 63 IN | DIASTOLIC BLOOD PRESSURE: 52 MMHG | SYSTOLIC BLOOD PRESSURE: 92 MMHG | RESPIRATION RATE: 16 BRPM | TEMPERATURE: 97.3 F

## 2018-09-25 LAB
B-HCG FREE SERPL-ACNC: <5 MIU/ML
IHCGL IHCGL: NEGATIVE MIU/ML

## 2018-09-25 PROCEDURE — A4338 INDWELLING CATHETER LATEX: HCPCS | Performed by: SPECIALIST

## 2018-09-25 PROCEDURE — 84702 CHORIONIC GONADOTROPIN TEST: CPT

## 2018-09-25 PROCEDURE — 160036 HCHG PACU - EA ADDL 30 MINS PHASE I: Performed by: SPECIALIST

## 2018-09-25 PROCEDURE — 700101 HCHG RX REV CODE 250

## 2018-09-25 PROCEDURE — 160048 HCHG OR STATISTICAL LEVEL 1-5: Performed by: SPECIALIST

## 2018-09-25 PROCEDURE — 501579 HCHG TROCAR, STEP 5MM: Performed by: SPECIALIST

## 2018-09-25 PROCEDURE — 160041 HCHG SURGERY MINUTES - EA ADDL 1 MIN LEVEL 4: Performed by: SPECIALIST

## 2018-09-25 PROCEDURE — 700102 HCHG RX REV CODE 250 W/ 637 OVERRIDE(OP): Performed by: ANESTHESIOLOGY

## 2018-09-25 PROCEDURE — 700111 HCHG RX REV CODE 636 W/ 250 OVERRIDE (IP)

## 2018-09-25 PROCEDURE — 160002 HCHG RECOVERY MINUTES (STAT): Performed by: SPECIALIST

## 2018-09-25 PROCEDURE — 502703 HCHG DEVICE, LIGASURE V SEALER: Performed by: SPECIALIST

## 2018-09-25 PROCEDURE — 500886 HCHG PACK, LAPAROSCOPY: Performed by: SPECIALIST

## 2018-09-25 PROCEDURE — 700111 HCHG RX REV CODE 636 W/ 250 OVERRIDE (IP): Performed by: ANESTHESIOLOGY

## 2018-09-25 PROCEDURE — 501330 HCHG SET, CYSTO IRRIG TUBING: Performed by: SPECIALIST

## 2018-09-25 PROCEDURE — 160035 HCHG PACU - 1ST 60 MINS PHASE I: Performed by: SPECIALIST

## 2018-09-25 PROCEDURE — 501516 HCHG SUTURE, CAPIO: Performed by: SPECIALIST

## 2018-09-25 PROCEDURE — 502240 HCHG MISC OR SUPPLY RC 0272: Performed by: SPECIALIST

## 2018-09-25 PROCEDURE — 160029 HCHG SURGERY MINUTES - 1ST 30 MINS LEVEL 4: Performed by: SPECIALIST

## 2018-09-25 PROCEDURE — 501577 HCHG TROCAR, STEP 11MM: Performed by: SPECIALIST

## 2018-09-25 PROCEDURE — 88307 TISSUE EXAM BY PATHOLOGIST: CPT

## 2018-09-25 PROCEDURE — 160009 HCHG ANES TIME/MIN: Performed by: SPECIALIST

## 2018-09-25 PROCEDURE — 501838 HCHG SUTURE GENERAL: Performed by: SPECIALIST

## 2018-09-25 PROCEDURE — A9270 NON-COVERED ITEM OR SERVICE: HCPCS | Performed by: ANESTHESIOLOGY

## 2018-09-25 PROCEDURE — 500854 HCHG NEEDLE, INSUFFLATION FOR STEP: Performed by: SPECIALIST

## 2018-09-25 PROCEDURE — C1771 REP DEV, URINARY, W/SLING: HCPCS | Performed by: SPECIALIST

## 2018-09-25 DEVICE — SLING TOT OBTRYX I HALO: Type: IMPLANTABLE DEVICE | Site: BLADDER | Status: FUNCTIONAL

## 2018-09-25 RX ORDER — OXYCODONE HCL 5 MG/5 ML
5 SOLUTION, ORAL ORAL
Status: COMPLETED | OUTPATIENT
Start: 2018-09-25 | End: 2018-09-25

## 2018-09-25 RX ORDER — SODIUM CHLORIDE, SODIUM LACTATE, POTASSIUM CHLORIDE, CALCIUM CHLORIDE 600; 310; 30; 20 MG/100ML; MG/100ML; MG/100ML; MG/100ML
INJECTION, SOLUTION INTRAVENOUS CONTINUOUS
Status: ACTIVE | OUTPATIENT
Start: 2018-09-25 | End: 2018-09-25

## 2018-09-25 RX ORDER — NALOXONE HYDROCHLORIDE 0.4 MG/ML
0.1 INJECTION, SOLUTION INTRAMUSCULAR; INTRAVENOUS; SUBCUTANEOUS PRN
Status: DISCONTINUED | OUTPATIENT
Start: 2018-09-25 | End: 2018-09-25 | Stop reason: HOSPADM

## 2018-09-25 RX ORDER — HALOPERIDOL 5 MG/ML
1 INJECTION INTRAMUSCULAR
Status: DISCONTINUED | OUTPATIENT
Start: 2018-09-25 | End: 2018-09-25 | Stop reason: HOSPADM

## 2018-09-25 RX ORDER — OXYCODONE HCL 5 MG/5 ML
10 SOLUTION, ORAL ORAL
Status: COMPLETED | OUTPATIENT
Start: 2018-09-25 | End: 2018-09-25

## 2018-09-25 RX ORDER — DIPHENHYDRAMINE HCL 25 MG
25 TABLET ORAL EVERY 6 HOURS PRN
Status: DISCONTINUED | OUTPATIENT
Start: 2018-09-25 | End: 2018-09-25 | Stop reason: HOSPADM

## 2018-09-25 RX ORDER — SODIUM CHLORIDE, SODIUM LACTATE, POTASSIUM CHLORIDE, CALCIUM CHLORIDE 600; 310; 30; 20 MG/100ML; MG/100ML; MG/100ML; MG/100ML
INJECTION, SOLUTION INTRAVENOUS CONTINUOUS
Status: DISCONTINUED | OUTPATIENT
Start: 2018-09-25 | End: 2018-09-25 | Stop reason: HOSPADM

## 2018-09-25 RX ORDER — EPINEPHRINE 1 MG/ML
INJECTION INTRAMUSCULAR; INTRAVENOUS; SUBCUTANEOUS
Status: DISCONTINUED
Start: 2018-09-25 | End: 2018-09-25 | Stop reason: HOSPADM

## 2018-09-25 RX ORDER — BUPIVACAINE HYDROCHLORIDE AND EPINEPHRINE 2.5; 5 MG/ML; UG/ML
INJECTION, SOLUTION EPIDURAL; INFILTRATION; INTRACAUDAL; PERINEURAL
Status: DISCONTINUED | OUTPATIENT
Start: 2018-09-25 | End: 2018-09-25 | Stop reason: HOSPADM

## 2018-09-25 RX ORDER — OXYCODONE HYDROCHLORIDE 5 MG/1
10 TABLET ORAL
Status: DISCONTINUED | OUTPATIENT
Start: 2018-09-25 | End: 2018-09-25 | Stop reason: HOSPADM

## 2018-09-25 RX ORDER — ONDANSETRON 2 MG/ML
4 INJECTION INTRAMUSCULAR; INTRAVENOUS
Status: COMPLETED | OUTPATIENT
Start: 2018-09-25 | End: 2018-09-25

## 2018-09-25 RX ORDER — LIDOCAINE HYDROCHLORIDE 10 MG/ML
0.5 INJECTION, SOLUTION INFILTRATION; PERINEURAL
Status: DISCONTINUED | OUTPATIENT
Start: 2018-09-25 | End: 2018-09-25 | Stop reason: HOSPADM

## 2018-09-25 RX ORDER — OXYCODONE HYDROCHLORIDE 5 MG/1
5 TABLET ORAL
Status: DISCONTINUED | OUTPATIENT
Start: 2018-09-25 | End: 2018-09-25 | Stop reason: HOSPADM

## 2018-09-25 RX ORDER — HYDROMORPHONE HYDROCHLORIDE 2 MG/ML
0.1 INJECTION, SOLUTION INTRAMUSCULAR; INTRAVENOUS; SUBCUTANEOUS
Status: DISCONTINUED | OUTPATIENT
Start: 2018-09-25 | End: 2018-09-25 | Stop reason: HOSPADM

## 2018-09-25 RX ORDER — BUPIVACAINE HYDROCHLORIDE AND EPINEPHRINE 2.5; 5 MG/ML; UG/ML
INJECTION, SOLUTION INFILTRATION; PERINEURAL
Status: DISCONTINUED | OUTPATIENT
Start: 2018-09-25 | End: 2018-09-25 | Stop reason: HOSPADM

## 2018-09-25 RX ORDER — LABETALOL HYDROCHLORIDE 5 MG/ML
5 INJECTION, SOLUTION INTRAVENOUS
Status: DISCONTINUED | OUTPATIENT
Start: 2018-09-25 | End: 2018-09-25 | Stop reason: HOSPADM

## 2018-09-25 RX ORDER — GLYCOPYRROLATE 0.2 MG/ML
0.2 INJECTION INTRAMUSCULAR; INTRAVENOUS
Status: DISCONTINUED | OUTPATIENT
Start: 2018-09-25 | End: 2018-09-25 | Stop reason: HOSPADM

## 2018-09-25 RX ORDER — HYDROMORPHONE HYDROCHLORIDE 2 MG/ML
0.2 INJECTION, SOLUTION INTRAMUSCULAR; INTRAVENOUS; SUBCUTANEOUS
Status: DISCONTINUED | OUTPATIENT
Start: 2018-09-25 | End: 2018-09-25 | Stop reason: HOSPADM

## 2018-09-25 RX ORDER — HYDROMORPHONE HYDROCHLORIDE 2 MG/ML
0.4 INJECTION, SOLUTION INTRAMUSCULAR; INTRAVENOUS; SUBCUTANEOUS
Status: DISCONTINUED | OUTPATIENT
Start: 2018-09-25 | End: 2018-09-25 | Stop reason: HOSPADM

## 2018-09-25 RX ORDER — ONDANSETRON 2 MG/ML
INJECTION INTRAMUSCULAR; INTRAVENOUS
Status: COMPLETED
Start: 2018-09-25 | End: 2018-09-25

## 2018-09-25 RX ORDER — BUPIVACAINE HYDROCHLORIDE 2.5 MG/ML
INJECTION, SOLUTION EPIDURAL; INFILTRATION; INTRACAUDAL
Status: DISCONTINUED
Start: 2018-09-25 | End: 2018-09-25 | Stop reason: HOSPADM

## 2018-09-25 RX ORDER — MEPERIDINE HYDROCHLORIDE 25 MG/ML
6.25 INJECTION INTRAMUSCULAR; INTRAVENOUS; SUBCUTANEOUS
Status: DISCONTINUED | OUTPATIENT
Start: 2018-09-25 | End: 2018-09-25 | Stop reason: HOSPADM

## 2018-09-25 RX ADMIN — HYDROMORPHONE HYDROCHLORIDE 0.2 MG: 2 INJECTION INTRAMUSCULAR; INTRAVENOUS; SUBCUTANEOUS at 09:22

## 2018-09-25 RX ADMIN — HYDROMORPHONE HYDROCHLORIDE 0.4 MG: 2 INJECTION, SOLUTION INTRAMUSCULAR; INTRAVENOUS; SUBCUTANEOUS at 09:57

## 2018-09-25 RX ADMIN — SODIUM CHLORIDE, SODIUM LACTATE, POTASSIUM CHLORIDE, CALCIUM CHLORIDE: 600; 310; 30; 20 INJECTION, SOLUTION INTRAVENOUS at 06:52

## 2018-09-25 RX ADMIN — ONDANSETRON 4 MG: 2 INJECTION INTRAMUSCULAR; INTRAVENOUS at 09:06

## 2018-09-25 RX ADMIN — HALOPERIDOL LACTATE 1 MG: 5 INJECTION, SOLUTION INTRAMUSCULAR at 09:45

## 2018-09-25 RX ADMIN — FENTANYL CITRATE 50 MCG: 50 INJECTION, SOLUTION INTRAMUSCULAR; INTRAVENOUS at 09:15

## 2018-09-25 RX ADMIN — OXYCODONE HYDROCHLORIDE 10 MG: 5 SOLUTION ORAL at 09:35

## 2018-09-25 RX ADMIN — HYDROMORPHONE HYDROCHLORIDE 0.4 MG: 2 INJECTION, SOLUTION INTRAMUSCULAR; INTRAVENOUS; SUBCUTANEOUS at 09:38

## 2018-09-25 RX ADMIN — HYDROMORPHONE HYDROCHLORIDE 0.2 MG: 2 INJECTION INTRAMUSCULAR; INTRAVENOUS; SUBCUTANEOUS at 09:30

## 2018-09-25 RX ADMIN — HYDROMORPHONE HYDROCHLORIDE 0.4 MG: 2 INJECTION, SOLUTION INTRAMUSCULAR; INTRAVENOUS; SUBCUTANEOUS at 09:51

## 2018-09-25 ASSESSMENT — PAIN SCALES - GENERAL
PAINLEVEL_OUTOF10: 6
PAINLEVEL_OUTOF10: 6
PAINLEVEL_OUTOF10: 10
PAINLEVEL_OUTOF10: 6
PAINLEVEL_OUTOF10: 6
PAINLEVEL_OUTOF10: 10
PAINLEVEL_OUTOF10: 0
PAINLEVEL_OUTOF10: 10
PAINLEVEL_OUTOF10: 10
PAINLEVEL_OUTOF10: 7

## 2018-09-25 NOTE — OR SURGEON
Immediate Post OP Note    PreOp Diagnosis: Menometrorrhagia, dysmenorrhea, uterovaginal prolapse,   type 2 stress urinary incontinence demonstrated by urodynamic studies,   dyspareunia, which she attributes to her uterovaginal prolapse.    PostOp Diagnosis: Same; final pathology pending    Procedure(s):  VAGINAL HYSTERECTOMY SCOPE TOTAL - Wound Class: Clean Contaminated  SALPINGECTOMY - Wound Class: Clean Contaminated  ANTERIOR AND POSTERIOR REPAIR - Wound Class: Clean Contaminated  ENTEROCELE REPAIR- PERINEOPLASTY - Wound Class: Clean Contaminated  BLADDER SLING FEMALE- TOT - Wound Class: Clean Contaminated  VAGINAL SUSPENSION- POSS SACROSPINOUS VAULT - Wound Class: Clean Contaminated    Surgeon(s):  Abram Dupree M.D.    Anesthesiologist/Type of Anesthesia:  Anesthesiologist: Héctor Salazar M.D./General    Surgical Staff:  Circulator: Marsi Bui R.N.  Relief Circulator: Samra Rich R.N.  Scrub Person: Sandy Mena    Specimens removed if any:  ID Type Source Tests Collected by Time Destination   A :  Tissue Other Tissue PATHOLOGY SPECIMEN Abram Dupree M.D. 9/25/2018  7:48 AM        Estimated Blood Loss: 100ccs.    Findings: Bulbous uterus with with normal appearing adnexa and pelvis, good support of the anterior and the posterior and the apical vaginal tissue without any undue tension at the mid urethra after sling placement, cystoscopy revealed bilateral ureteral efflux, normal bladder and no undue tension at the mid urethra after sling placement.    Complications: None        9/25/2018 8:43 AM Abram Dupree M.D.

## 2018-09-25 NOTE — OR NURSING
0848 Received pt from OR, received report from Dr. Sullivan. Pt supine, c/o pain to lower abdomen.   0906 Pt nauseated, Zofran provided, see MAR.  0912IV pain medication provided, see MAR.  0915IV pain medication provided, see MAR.  0922 IV pain medication provided, see MAR.  0930 IV pain medication provided, see MAR.  0935 Orals provided. Additional IV pain medication provided, see MAR.  0945 Pt continues to have nausea, medication provided, see MAR.   0951 IV pain medication provided, see MAR.  0957 IV pain medication provided, see MAR. Pt's mother at bedside. Pt's pain is improving.   1030 Pt's pain improved. Pt resting comfortably.  1130 Pt is more awake, tolerating fluids. Pt feels ready to discharge. Discharge instructions reviewed.   1205 Pt dressed. IV removed. Pt taken out by wheelchair.

## 2018-09-25 NOTE — OP REPORT
DATE OF SERVICE:  9/25/2018     PREOPERATIVE DIAGNOSES:  Menometrorrhagia, dysmenorrhea, uterovaginal prolapse,   type 2 stress urinary incontinence demonstrated by urodynamic studies,   dyspareunia, which she attributes to her uterovaginal prolapse.     POSTOPERATIVE DIAGNOSES:  Same     FINAL PATHOLOGY:  Pending.     PROCEDURES PERFORMED:  Procedure(s):  VAGINAL HYSTERECTOMY SCOPE TOTAL - Wound Class: Clean Contaminated  SALPINGECTOMY - Wound Class: Clean Contaminated  ANTERIOR AND POSTERIOR REPAIR - Wound Class: Clean Contaminated  ENTEROCELE REPAIR- PERINEOPLASTY - Wound Class: Clean Contaminated  BLADDER SLING FEMALE- TOT - Wound Class: Clean Contaminated  VAGINAL SUSPENSION- POSS SACROSPINOUS VAULT - Wound Class: Clean Contaminated     SURGEON:  Abram Dupree MD.     ASSISTANT:  Scar Dixon MD.     ANESTHESIA:  General     ANESTHESIOLOGIST:  Anesthesiologist: Héctor Salazar M.D.     ESTIMATED BLOOD LOSS FOR THE PROCEDURE:  100 mL.     FINDINGS:  Bulbous uterus with with normal appearing adnexa and pelvis.  Good support of the anterior and posterior vaginal  walls in addition to apical vaginal tissue without any undue tension in the    suture sites.  Cystoscopy revealed normal urinary bladder, bilateral ureteral    efflux, and no undue tension noted at the level of the mid urethra after a    sling placement on cystoscopic evaluation.     DESCRIPTION OF PROCEDURE:  The patient was taken to the operating room, where  general anesthesia was performed without difficulty.  The patient was then    prepped and draped in usual sterile fashion with lower extremities placed in    Gonzalo stirrups.  Attention was first turned to the perineum, where a weighted    speculum was placed with the use of Howell retractor.  Single-toothed tenaculum    was placed on the anterior lip of the cervix.  Hulka uterine manipulator was    then placed.  Single-toothed tenaculum and retractors were then removed.     Attention was then turned  to the umbilicus, where a 1 cm incision was made.  A Veress needle was   placed, 3.5 L of carboperitoneum were then obtained.  A 10 mm trocar port was   then placed. Two separate ports were placed approximately one-third of the    way from the anterior-superior iliac spine lateral to the rectus muscle under    direct laparoscopic visualization.  Attention was then turned to the distal    portion of the fallopian tubes, which were grasped just below the fallopian    tube above the ovary.  This area was grasped, cauterized, and transected on    each side.  The mesosalpinx underneath the fallopian tube was then grasped,    cauterized, and transected all the way to the level of the uterus, freeing up    the fallopian tube on each side.  The uteroovarian, broad, and round ligaments   were individually isolated, cauterized, and transected.  The vesicouterine    peritoneum was grasped, incised, and the bladder flap was created.  Uterine    vessels were then clamped, cauterized, and transected  on each side.  The    vesicouterine peritoneum was then completed.  Attention was then turned to the   perineum, where a weighted speculum was placed with the use of Howell    retractor.  A thyroid tenaculum was placed on the anterior lip, one on the    posterior lips of the cervix.  Cervix was then injected with 10 mL of 0.25%    Marcaine with epinephrine.  Incision was made starting anteriorly, proceeding    posterior, proceeding back anterior once again.  With the use of Bovie    electrocautery, after injecting 10 mL of 0.25% Marcaine with epinephrine, the    anterior cul-de-sac was entered sharply.  Right angle Ragini retractor was    placed upon sharp entry in the anterior cul-de-sac.  Large duck billed    speculum was placed upon sharp entry in the posterior cul-de-sac.  Uterosacral   cardinal complexes were then grasped with ZED clamps, transected, and suture    ligated with 0 Vicryl suture bilaterally.  Attention was then  turned to the    perineum, where the uterus and both fallopian tubes were then handed off the    field as specimen.  Excellent hemostasis noted at all vascular pedicles.  The    anterior and posterior leaf of the peritoneum in addition to the uterosacral    cardinal complexes were reapproximated in the midline with a pursestring    suture using 2-0 Vicryl.  The vaginal cuff was then reapproximated with    figure-of-eight sutures using 0 Vicryl reapproximating both uterosacral    cardinal complex and respective vaginal apices.  The anterior vaginal mucosa    was then injected with 10 mL of 0.25% Marcaine with epinephrine.  The vaginal    mucosa was then dissected off the underlying pubocervical fascia rufino until    the levator muscles were then reached.  Horizontal mattress sutures were then    used to reapproximate the pubocervical fascia in midline in a double 0    closure, thereby reducing the midline cystocele.  A 10 mL of 0.25% Marcaine    with epinephrine were injected with 5 mL on the right and 5 mL the left    lateral to the clitoris in the labial crural fold followed by stab incision    made with the use of #11 blade scalpel followed by placement of Obtryx halo    needles through the labial crural incision sites through the obturator    membrane out through the vaginal mucosal incision at the level of the mid    urethra.  The sling was then applied to the respective Obtryx halo needles and   it was taken back out through their original track.  Tensioning and position    was then performed.  Cabral catheter was removed, which had been placed at the    beginning of the case for placement of a 30-degree cystoscope, which revealed    normal urinary bladder,  bilateral ureteral efflux, and no undue tension noted   at the level of the mid urethra.  The sling had been released under direct    cystoscopic evaluation.  Tensioning position was then finalized.  Cystoscope    removed.  Cabral catheter replaced.  All  excess vaginal mucosa and sling    material were then excised.  The vaginal mucosa was then reapproximated with    2-0 Vicryl in a running interlocking fashion in one segment.  Posterior    vaginal mucosa was then injected with 10 mL of 0.25% Marcaine with    epinephrine.  The vaginal mucosa was then dissected off the underlying    rectovaginal fascia rufino until the levator muscles were then reached.     Dissection of the sacrospinous process and ischial spine was then performed on   the right, 3 cm medial along the sacrospinous ligament with straight    catheterization needle device, 0 Ethibond suture was taken through the    sacrospinous ligament and taken out through the right apical portion of the    vaginal cuff on the underlying vaginal mucosa.  The rectovaginal septum was    then reapproximated in the posterior aspect of the vaginal cuff and a double    pursestring suture, thereby reducing a large enterocele located at the    superior aspect of the dissection.  Horizontal mattress suture was then used    to reapproximate the rectovaginal fascia in the midline in a double 0 closure,   thereby reducing the midline rectocele.  Excess vaginal mucosa was then    trimmed.  The vaginal mucosa was then reapproximated with 2-0 Vicryl in a    running interlocking fashion in one segment for a perineoplasty on the    perineum.  Attention was then turned back to the abdomen and pelvis.  Pelvis    was inspected and noted to be hemostatic, 3.5 L of carboperitoneum removed    followed by removal of the ports under direct laparoscopic visualization.  The   incisions were then reapproximated with single interrupted sutures using 4-0    Vicryl, injected with 20 mL of 0.25% Marcaine with epinephrine.  The patient    tolerated the procedure well and was awoken from general anesthesia, was taken   to the recovery room in stable condition.        ____________________________________     PAIGE LE MD

## 2018-09-25 NOTE — DISCHARGE INSTRUCTIONS
ACTIVITY: Rest and take it easy for the first 24 hours.  A responsible adult is recommended to remain with you during that time.  It is normal to feel sleepy.  We encourage you to not do anything that requires balance, judgment or coordination.    MILD FLU-LIKE SYMPTOMS ARE NORMAL. YOU MAY EXPERIENCE GENERALIZED MUSCLE ACHES, THROAT IRRITATION, HEADACHE AND/OR SOME NAUSEA.    FOR 24 HOURS DO NOT:  Drive, operate machinery or run household appliances.  Drink beer or alcoholic beverages.   Make important decisions or sign legal documents.    SPECIAL INSTRUCTIONS: *Follow handouts. Go to office tomorrow after 0900am for dugan removal.**    DIET: To avoid nausea, slowly advance diet as tolerated, avoiding spicy or greasy foods for the first day.  Add more substantial food to your diet according to your physician's instructions.  Babies can be fed formula or breast milk as soon as they are hungry.  INCREASE FLUIDS AND FIBER TO AVOID CONSTIPATION.    SURGICAL DRESSING/BATHING: *No baths, swimming, or soaking in water until approved by Dr. Dupree.**    FOLLOW-UP APPOINTMENT:  A follow-up appointment should be arranged with your doctor as scheduled.    You should CALL YOUR PHYSICIAN if you develop:  Fever greater than 101 degrees F.  Pain not relieved by medication, or persistent nausea or vomiting.  Excessive bleeding (blood soaking through dressing) or unexpected drainage from the wound.  Extreme redness or swelling around the incision site, drainage of pus or foul smelling drainage.  Inability to urinate or empty your bladder within 8 hours.  Problems with breathing or chest pain.    You should call 911 if you develop problems with breathing or chest pain.  If you are unable to contact your doctor or surgical center, you should go to the nearest emergency room or urgent care center.  Physician's telephone #: *[phone]**    If any questions arise, call your doctor.  If your doctor is not available, please feel free to  call the Surgical Center at [phone].  The Center is open Monday through Friday from 7AM to 7PM.  You can also call the HEALTH HOTLINE open 24 hours/day, 7 days/week and speak to a nurse at [phone], or toll free at [phone].    A registered nurse may call you a few days after your surgery to see how you are doing after your procedure.    MEDICATIONS: Resume taking daily medication.  Take prescribed pain medication with food.  If no medication is prescribed, you may take non-aspirin pain medication if needed.  PAIN MEDICATION CAN BE VERY CONSTIPATING.  Take a stool softener or laxative such as senokot, pericolace, or milk of magnesia if needed.    Prescription at home. Last pain medication given at *0935am**.    If your physician has prescribed pain medication that includes Acetaminophen (Tylenol), do not take additional Acetaminophen (Tylenol) while taking the prescribed medication.    Depression / Suicide Risk    As you are discharged from this [facility] facility, it is important to learn how to keep safe from harming yourself.    Recognize the warning signs:  · Abrupt changes in personality, positive or negative- including increase in energy   · Giving away possessions  · Change in eating patterns- significant weight changes-  positive or negative  · Change in sleeping patterns- unable to sleep or sleeping all the time   · Unwillingness or inability to communicate  · Depression  · Unusual sadness, discouragement and loneliness  · Talk of wanting to die  · Neglect of personal appearance   · Rebelliousness- reckless behavior  · Withdrawal from people/activities they love  · Confusion- inability to concentrate     If you or a loved one observes any of these behaviors or has concerns about self-harm, here's what you can do:  · Talk about it- your feelings and reasons for harming yourself  · Remove any means that you might use to hurt yourself (examples: pills, rope, extension cords,  firearm)  · Get professional help from the community (Mental Health, Substance Abuse, psychological counseling)  · Do not be alone:Call your Safe Contact- someone whom you trust who will be there for you.  · Call your local CRISIS HOTLINE 627-0404 or 162-461-5788  · Call your local Children's Mobile Crisis Response Team [place] (596) 976-2108 or www.[employer]  · Call the toll free National Suicide Prevention Hotlines   · National Suicide Prevention Lifeline 745-790-AUBJ (0411)  · National Hope Line Network 800-SUICIDE (977-5960)

## 2019-10-21 ENCOUNTER — HOSPITAL ENCOUNTER (OUTPATIENT)
Facility: MEDICAL CENTER | Age: 29
End: 2019-10-23
Attending: EMERGENCY MEDICINE | Admitting: FAMILY MEDICINE
Payer: MEDICAID

## 2019-10-21 ENCOUNTER — APPOINTMENT (OUTPATIENT)
Dept: RADIOLOGY | Facility: MEDICAL CENTER | Age: 29
End: 2019-10-21
Attending: EMERGENCY MEDICINE
Payer: MEDICAID

## 2019-10-21 DIAGNOSIS — R10.31 RIGHT LOWER QUADRANT ABDOMINAL PAIN: ICD-10-CM

## 2019-10-21 DIAGNOSIS — R19.7 DIARRHEA OF PRESUMED INFECTIOUS ORIGIN: ICD-10-CM

## 2019-10-21 DIAGNOSIS — R11.2 NON-INTRACTABLE VOMITING WITH NAUSEA, UNSPECIFIED VOMITING TYPE: ICD-10-CM

## 2019-10-21 PROBLEM — K52.9 GASTROENTERITIS: Status: ACTIVE | Noted: 2019-10-21

## 2019-10-21 PROBLEM — E86.0 DEHYDRATION: Status: ACTIVE | Noted: 2019-10-21

## 2019-10-21 PROBLEM — D64.9 ANEMIA: Status: ACTIVE | Noted: 2019-10-21

## 2019-10-21 LAB
ALBUMIN SERPL BCP-MCNC: 4.2 G/DL (ref 3.2–4.9)
ALBUMIN/GLOB SERPL: 1.4 G/DL
ALP SERPL-CCNC: 155 U/L (ref 30–99)
ALT SERPL-CCNC: 22 U/L (ref 2–50)
ANION GAP SERPL CALC-SCNC: 9 MMOL/L (ref 0–11.9)
ANISOCYTOSIS BLD QL SMEAR: ABNORMAL
APPEARANCE UR: CLEAR
AST SERPL-CCNC: 27 U/L (ref 12–45)
BASOPHILS # BLD AUTO: 0.8 % (ref 0–1.8)
BASOPHILS # BLD: 0.05 K/UL (ref 0–0.12)
BILIRUB SERPL-MCNC: 0.3 MG/DL (ref 0.1–1.5)
BILIRUB UR QL STRIP.AUTO: NEGATIVE
BUN SERPL-MCNC: 5 MG/DL (ref 8–22)
CALCIUM SERPL-MCNC: 8.6 MG/DL (ref 8.5–10.5)
CHLORIDE SERPL-SCNC: 107 MMOL/L (ref 96–112)
CO2 SERPL-SCNC: 24 MMOL/L (ref 20–33)
COLOR UR: YELLOW
COMMENT 1642: NORMAL
CREAT SERPL-MCNC: 0.58 MG/DL (ref 0.5–1.4)
EOSINOPHIL # BLD AUTO: 0.09 K/UL (ref 0–0.51)
EOSINOPHIL NFR BLD: 1.5 % (ref 0–6.9)
ERYTHROCYTE [DISTWIDTH] IN BLOOD BY AUTOMATED COUNT: 53.2 FL (ref 35.9–50)
GLOBULIN SER CALC-MCNC: 3 G/DL (ref 1.9–3.5)
GLUCOSE SERPL-MCNC: 83 MG/DL (ref 65–99)
GLUCOSE UR STRIP.AUTO-MCNC: NEGATIVE MG/DL
HCT VFR BLD AUTO: 32.5 % (ref 37–47)
HGB BLD-MCNC: 9.7 G/DL (ref 12–16)
HYPOCHROMIA BLD QL SMEAR: ABNORMAL
IMM GRANULOCYTES # BLD AUTO: 0.02 K/UL (ref 0–0.11)
IMM GRANULOCYTES NFR BLD AUTO: 0.3 % (ref 0–0.9)
KETONES UR STRIP.AUTO-MCNC: NEGATIVE MG/DL
LEUKOCYTE ESTERASE UR QL STRIP.AUTO: NEGATIVE
LYMPHOCYTES # BLD AUTO: 1.57 K/UL (ref 1–4.8)
LYMPHOCYTES NFR BLD: 26.2 % (ref 22–41)
MACROCYTES BLD QL SMEAR: ABNORMAL
MCH RBC QN AUTO: 25.8 PG (ref 27–33)
MCHC RBC AUTO-ENTMCNC: 29.8 G/DL (ref 33.6–35)
MCV RBC AUTO: 86.4 FL (ref 81.4–97.8)
MICRO URNS: NORMAL
MONOCYTES # BLD AUTO: 0.51 K/UL (ref 0–0.85)
MONOCYTES NFR BLD AUTO: 8.5 % (ref 0–13.4)
MORPHOLOGY BLD-IMP: NORMAL
NEUTROPHILS # BLD AUTO: 3.76 K/UL (ref 2–7.15)
NEUTROPHILS NFR BLD: 62.7 % (ref 44–72)
NITRITE UR QL STRIP.AUTO: NEGATIVE
NRBC # BLD AUTO: 0 K/UL
NRBC BLD-RTO: 0 /100 WBC
PH UR STRIP.AUTO: 5.5 [PH] (ref 5–8)
PLATELET # BLD AUTO: 350 K/UL (ref 164–446)
PLATELET BLD QL SMEAR: NORMAL
PMV BLD AUTO: 10.1 FL (ref 9–12.9)
POTASSIUM SERPL-SCNC: 3.8 MMOL/L (ref 3.6–5.5)
PROT SERPL-MCNC: 7.2 G/DL (ref 6–8.2)
PROT UR QL STRIP: NEGATIVE MG/DL
RBC # BLD AUTO: 3.76 M/UL (ref 4.2–5.4)
RBC BLD AUTO: PRESENT
RBC UR QL AUTO: NEGATIVE
SODIUM SERPL-SCNC: 140 MMOL/L (ref 135–145)
SP GR UR STRIP.AUTO: 1.01
UROBILINOGEN UR STRIP.AUTO-MCNC: 0.2 MG/DL
WBC # BLD AUTO: 6 K/UL (ref 4.8–10.8)

## 2019-10-21 PROCEDURE — 90471 IMMUNIZATION ADMIN: CPT

## 2019-10-21 PROCEDURE — G0378 HOSPITAL OBSERVATION PER HR: HCPCS

## 2019-10-21 PROCEDURE — 96376 TX/PRO/DX INJ SAME DRUG ADON: CPT

## 2019-10-21 PROCEDURE — 87899 AGENT NOS ASSAY W/OPTIC: CPT

## 2019-10-21 PROCEDURE — 87177 OVA AND PARASITES SMEARS: CPT

## 2019-10-21 PROCEDURE — 99220 PR INITIAL OBSERVATION CARE,LEVL III: CPT | Performed by: FAMILY MEDICINE

## 2019-10-21 PROCEDURE — 87045 FECES CULTURE AEROBIC BACT: CPT

## 2019-10-21 PROCEDURE — 99285 EMERGENCY DEPT VISIT HI MDM: CPT

## 2019-10-21 PROCEDURE — 74177 CT ABD & PELVIS W/CONTRAST: CPT

## 2019-10-21 PROCEDURE — 700117 HCHG RX CONTRAST REV CODE 255: Performed by: EMERGENCY MEDICINE

## 2019-10-21 PROCEDURE — 87046 STOOL CULTR AEROBIC BACT EA: CPT

## 2019-10-21 PROCEDURE — 96375 TX/PRO/DX INJ NEW DRUG ADDON: CPT

## 2019-10-21 PROCEDURE — 80053 COMPREHEN METABOLIC PANEL: CPT

## 2019-10-21 PROCEDURE — 700111 HCHG RX REV CODE 636 W/ 250 OVERRIDE (IP): Performed by: EMERGENCY MEDICINE

## 2019-10-21 PROCEDURE — 85025 COMPLETE CBC W/AUTO DIFF WBC: CPT

## 2019-10-21 PROCEDURE — A9270 NON-COVERED ITEM OR SERVICE: HCPCS | Performed by: FAMILY MEDICINE

## 2019-10-21 PROCEDURE — 700105 HCHG RX REV CODE 258: Performed by: FAMILY MEDICINE

## 2019-10-21 PROCEDURE — 700105 HCHG RX REV CODE 258: Performed by: EMERGENCY MEDICINE

## 2019-10-21 PROCEDURE — 90686 IIV4 VACC NO PRSV 0.5 ML IM: CPT | Performed by: FAMILY MEDICINE

## 2019-10-21 PROCEDURE — 700111 HCHG RX REV CODE 636 W/ 250 OVERRIDE (IP): Performed by: FAMILY MEDICINE

## 2019-10-21 PROCEDURE — 89055 LEUKOCYTE ASSESSMENT FECAL: CPT

## 2019-10-21 PROCEDURE — 700102 HCHG RX REV CODE 250 W/ 637 OVERRIDE(OP): Performed by: FAMILY MEDICINE

## 2019-10-21 PROCEDURE — 87209 SMEAR COMPLEX STAIN: CPT

## 2019-10-21 PROCEDURE — 700111 HCHG RX REV CODE 636 W/ 250 OVERRIDE (IP)

## 2019-10-21 PROCEDURE — 81003 URINALYSIS AUTO W/O SCOPE: CPT

## 2019-10-21 PROCEDURE — 94760 N-INVAS EAR/PLS OXIMETRY 1: CPT

## 2019-10-21 RX ORDER — PROMETHAZINE HYDROCHLORIDE 25 MG/1
12.5-25 TABLET ORAL EVERY 4 HOURS PRN
Status: DISCONTINUED | OUTPATIENT
Start: 2019-10-21 | End: 2019-10-23 | Stop reason: HOSPADM

## 2019-10-21 RX ORDER — ACETAMINOPHEN 325 MG/1
650 TABLET ORAL EVERY 6 HOURS PRN
Status: DISCONTINUED | OUTPATIENT
Start: 2019-10-21 | End: 2019-10-23 | Stop reason: HOSPADM

## 2019-10-21 RX ORDER — MORPHINE SULFATE 4 MG/ML
INJECTION, SOLUTION INTRAMUSCULAR; INTRAVENOUS
Status: COMPLETED
Start: 2019-10-21 | End: 2019-10-21

## 2019-10-21 RX ORDER — OXYCODONE HYDROCHLORIDE 5 MG/1
5 TABLET ORAL
Status: DISCONTINUED | OUTPATIENT
Start: 2019-10-21 | End: 2019-10-23 | Stop reason: HOSPADM

## 2019-10-21 RX ORDER — HYDROMORPHONE HYDROCHLORIDE 1 MG/ML
0.5 INJECTION, SOLUTION INTRAMUSCULAR; INTRAVENOUS; SUBCUTANEOUS ONCE
Status: COMPLETED | OUTPATIENT
Start: 2019-10-21 | End: 2019-10-21

## 2019-10-21 RX ORDER — ONDANSETRON 4 MG/1
4 TABLET, ORALLY DISINTEGRATING ORAL EVERY 4 HOURS PRN
Status: DISCONTINUED | OUTPATIENT
Start: 2019-10-21 | End: 2019-10-23 | Stop reason: HOSPADM

## 2019-10-21 RX ORDER — SODIUM CHLORIDE 9 MG/ML
1000 INJECTION, SOLUTION INTRAVENOUS ONCE
Status: COMPLETED | OUTPATIENT
Start: 2019-10-21 | End: 2019-10-21

## 2019-10-21 RX ORDER — MORPHINE SULFATE 4 MG/ML
4 INJECTION, SOLUTION INTRAMUSCULAR; INTRAVENOUS ONCE
Status: COMPLETED | OUTPATIENT
Start: 2019-10-21 | End: 2019-10-21

## 2019-10-21 RX ORDER — OXYCODONE HYDROCHLORIDE 5 MG/1
2.5 TABLET ORAL
Status: DISCONTINUED | OUTPATIENT
Start: 2019-10-21 | End: 2019-10-23 | Stop reason: HOSPADM

## 2019-10-21 RX ORDER — SODIUM CHLORIDE 9 MG/ML
INJECTION, SOLUTION INTRAVENOUS CONTINUOUS
Status: DISCONTINUED | OUTPATIENT
Start: 2019-10-21 | End: 2019-10-23 | Stop reason: HOSPADM

## 2019-10-21 RX ORDER — PROCHLORPERAZINE EDISYLATE 5 MG/ML
5-10 INJECTION INTRAMUSCULAR; INTRAVENOUS EVERY 4 HOURS PRN
Status: DISCONTINUED | OUTPATIENT
Start: 2019-10-21 | End: 2019-10-23 | Stop reason: HOSPADM

## 2019-10-21 RX ORDER — MORPHINE SULFATE 4 MG/ML
2 INJECTION, SOLUTION INTRAMUSCULAR; INTRAVENOUS
Status: DISCONTINUED | OUTPATIENT
Start: 2019-10-21 | End: 2019-10-23 | Stop reason: HOSPADM

## 2019-10-21 RX ORDER — PROMETHAZINE HYDROCHLORIDE 12.5 MG/1
12.5-25 SUPPOSITORY RECTAL EVERY 4 HOURS PRN
Status: DISCONTINUED | OUTPATIENT
Start: 2019-10-21 | End: 2019-10-23 | Stop reason: HOSPADM

## 2019-10-21 RX ORDER — ONDANSETRON 2 MG/ML
4 INJECTION INTRAMUSCULAR; INTRAVENOUS ONCE
Status: COMPLETED | OUTPATIENT
Start: 2019-10-21 | End: 2019-10-21

## 2019-10-21 RX ORDER — ONDANSETRON 2 MG/ML
4 INJECTION INTRAMUSCULAR; INTRAVENOUS EVERY 4 HOURS PRN
Status: DISCONTINUED | OUTPATIENT
Start: 2019-10-21 | End: 2019-10-23 | Stop reason: HOSPADM

## 2019-10-21 RX ADMIN — MORPHINE SULFATE 2 MG: 4 INJECTION INTRAVENOUS at 16:56

## 2019-10-21 RX ADMIN — MORPHINE SULFATE 4 MG: 4 INJECTION INTRAVENOUS at 12:14

## 2019-10-21 RX ADMIN — INFLUENZA A VIRUS A/BRISBANE/02/2018 IVR-190 (H1N1) ANTIGEN (FORMALDEHYDE INACTIVATED), INFLUENZA A VIRUS A/KANSAS/14/2017 X-327 (H3N2) ANTIGEN (FORMALDEHYDE INACTIVATED), INFLUENZA B VIRUS B/PHUKET/3073/2013 ANTIGEN (FORMALDEHYDE INACTIVATED), AND INFLUENZA B VIRUS B/MARYLAND/15/2016 BX-69A ANTIGEN (FORMALDEHYDE INACTIVATED) 0.5 ML: 15; 15; 15; 15 INJECTION, SUSPENSION INTRAMUSCULAR at 16:56

## 2019-10-21 RX ADMIN — ONDANSETRON 4 MG: 2 INJECTION INTRAMUSCULAR; INTRAVENOUS at 12:14

## 2019-10-21 RX ADMIN — SODIUM CHLORIDE 1000 ML: 9 INJECTION, SOLUTION INTRAVENOUS at 15:00

## 2019-10-21 RX ADMIN — OXYCODONE HYDROCHLORIDE 5 MG: 5 TABLET ORAL at 20:38

## 2019-10-21 RX ADMIN — MORPHINE SULFATE 4 MG: 4 INJECTION, SOLUTION INTRAMUSCULAR; INTRAVENOUS at 12:36

## 2019-10-21 RX ADMIN — MORPHINE SULFATE 4 MG: 4 INJECTION INTRAVENOUS at 12:36

## 2019-10-21 RX ADMIN — HYDROMORPHONE HYDROCHLORIDE 0.5 MG: 1 INJECTION, SOLUTION INTRAMUSCULAR; INTRAVENOUS; SUBCUTANEOUS at 13:59

## 2019-10-21 RX ADMIN — IOHEXOL 100 ML: 350 INJECTION, SOLUTION INTRAVENOUS at 14:37

## 2019-10-21 RX ADMIN — ONDANSETRON 4 MG: 2 INJECTION INTRAMUSCULAR; INTRAVENOUS at 16:56

## 2019-10-21 RX ADMIN — SODIUM CHLORIDE: 9 INJECTION, SOLUTION INTRAVENOUS at 16:50

## 2019-10-21 ASSESSMENT — ENCOUNTER SYMPTOMS
NAUSEA: 1
NECK PAIN: 0
FLANK PAIN: 0
DIARRHEA: 1
BLOOD IN STOOL: 0
VOMITING: 1
CHILLS: 0
HEARTBURN: 0
DIZZINESS: 0
DIAPHORESIS: 0
ABDOMINAL PAIN: 1
NERVOUS/ANXIOUS: 0
HEADACHES: 0
BLURRED VISION: 0
SORE THROAT: 0
PALPITATIONS: 0
FEVER: 0
SHORTNESS OF BREATH: 0
COUGH: 0
WHEEZING: 0
BACK PAIN: 0

## 2019-10-21 ASSESSMENT — LIFESTYLE VARIABLES
EVER FELT BAD OR GUILTY ABOUT YOUR DRINKING: NO
AVERAGE NUMBER OF DAYS PER WEEK YOU HAVE A DRINK CONTAINING ALCOHOL: 0
TOTAL SCORE: 0
CONSUMPTION TOTAL: NEGATIVE
TOTAL SCORE: 0
DOES PATIENT WANT TO STOP DRINKING: NO
HAVE PEOPLE ANNOYED YOU BY CRITICIZING YOUR DRINKING: NO
ON A TYPICAL DAY WHEN YOU DRINK ALCOHOL HOW MANY DRINKS DO YOU HAVE: 0
TOTAL SCORE: 0
EVER HAD A DRINK FIRST THING IN THE MORNING TO STEADY YOUR NERVES TO GET RID OF A HANGOVER: NO
EVER_SMOKED: NEVER
HAVE YOU EVER FELT YOU SHOULD CUT DOWN ON YOUR DRINKING: NO
HOW MANY TIMES IN THE PAST YEAR HAVE YOU HAD 5 OR MORE DRINKS IN A DAY: 0
ALCOHOL_USE: NO

## 2019-10-21 ASSESSMENT — PATIENT HEALTH QUESTIONNAIRE - PHQ9
SUM OF ALL RESPONSES TO PHQ9 QUESTIONS 1 AND 2: 0
2. FEELING DOWN, DEPRESSED, IRRITABLE, OR HOPELESS: NOT AT ALL
2. FEELING DOWN, DEPRESSED, IRRITABLE, OR HOPELESS: NOT AT ALL
SUM OF ALL RESPONSES TO PHQ9 QUESTIONS 1 AND 2: 0
1. LITTLE INTEREST OR PLEASURE IN DOING THINGS: NOT AT ALL
1. LITTLE INTEREST OR PLEASURE IN DOING THINGS: NOT AT ALL

## 2019-10-21 NOTE — ASSESSMENT & PLAN NOTE
IVF hydration  Symptom management with antiemetics and analgesics   Stool wbc, culture, OP- pending   Advance diet slowly as tolerated

## 2019-10-21 NOTE — H&P
Hospital Medicine History & Physical Note    Date of Service  10/21/2019    Primary Care Physician  Pcp Pt States None    Consultants  None    Code Status  Full    Chief Complaint  Diarrhea    History of Presenting Illness  29 y.o. female who presented 10/21/2019 with diarrhea.  Patient states that she started having diarrhea 3 days ago, it was very loose and watery several episodes a day which would not stop.  Yesterday she started having abdominal pain and nausea vomiting.  She denies any fever or chills.  She denies any chest pain, palpitation shortness of breath diaphoresis.  Denies any headache.  Patient states she was out of the country in [place] 2 weeks ago.  She denies any antibiotic use within the past 4 weeks.    Review of Systems  Review of Systems   Constitutional: Positive for malaise/fatigue. Negative for chills, diaphoresis and fever.   HENT: Negative for hearing loss and sore throat.    Eyes: Negative for blurred vision.   Respiratory: Negative for cough, shortness of breath and wheezing.    Cardiovascular: Negative for chest pain, palpitations and leg swelling.   Gastrointestinal: Positive for abdominal pain, diarrhea, nausea and vomiting. Negative for blood in stool, heartburn and melena.   Genitourinary: Negative for dysuria, flank pain and hematuria.   Musculoskeletal: Negative for back pain and neck pain.   Skin: Negative for rash.   Neurological: Negative for dizziness and headaches.   Psychiatric/Behavioral: The patient is not nervous/anxious.        Past Medical History   has a past medical history of Arthritis, Gallstones, History of anemia, Migraines (4/5/2010), Morbid obesity (HCC) (4/5/2010), Sleep apnea, Snoring, and Vaginal trichomoniasis (4/5/2010). She also has no past medical history of CAD (coronary artery disease), COPD, Liver disease, or Seizure disorder ([facility]).    Surgical History   has a past surgical history that includes lauren by laparoscopy (10/14/2010); other (2016); vaginal  hysterectomy scope total (9/25/2018); salpingectomy (Bilateral, 9/25/2018); anterior and posterior repair (9/25/2018); enterocele repair (9/25/2018); bladder sling female (9/25/2018); and vaginal suspension (9/25/2018).     Family History  Family history is unknown by patient.     Social History   reports that she has never smoked. She has never used smokeless tobacco. She reports that she does not drink alcohol or use drugs.    Allergies  Allergies   Allergen Reactions   • Nkda [No Known Drug Allergy]        Medications  None       Physical Exam  Temp:  [36 °C (96.8 °F)-36.6 °C (97.9 °F)] 36 °C (96.8 °F)  Pulse:  [62-78] 62  Resp:  [14-18] 18  BP: (102-130)/(47-76) 107/54  SpO2:  [94 %-98 %] 95 %    Physical Exam   Constitutional: She is oriented to person, place, and time. She appears well-developed and well-nourished.   HENT:   Head: Normocephalic and atraumatic.   Eyes: Pupils are equal, round, and reactive to light. Conjunctivae are normal.   Neck: No tracheal deviation present. No thyromegaly present.   Cardiovascular: Normal rate and regular rhythm.   Pulmonary/Chest: Effort normal and breath sounds normal.   Abdominal: Soft. Bowel sounds are normal. She exhibits no distension. There is tenderness (RLQ). There is no rebound and no guarding.   Musculoskeletal: She exhibits no edema.   Lymphadenopathy:     She has no cervical adenopathy.   Neurological: She is alert and oriented to person, place, and time.   Skin: Skin is warm and dry.   Nursing note and vitals reviewed.      Laboratory:  Recent Labs     10/21/19  1150   WBC 6.0   RBC 3.76*   HEMOGLOBIN 9.7*   HEMATOCRIT 32.5*   MCV 86.4   MCH 25.8*   MCHC 29.8*   RDW 53.2*   PLATELETCT 350   MPV 10.1     Recent Labs     10/21/19  1150   SODIUM 140   POTASSIUM 3.8   CHLORIDE 107   CO2 24   GLUCOSE 83   BUN 5*   CREATININE 0.58   CALCIUM 8.6     Recent Labs     10/21/19  1150   ALTSGPT 22   ASTSGOT 27   ALKPHOSPHAT 155*   TBILIRUBIN 0.3   GLUCOSE 83         No  results for input(s): NTPROBNP in the last 72 hours.      No results for input(s): TROPONINT in the last 72 hours.    Urinalysis:    Recent Labs     10/21/19  1230   SPECGRAVITY 1.010   GLUCOSEUR Negative   KETONES Negative   NITRITE Negative   LEUKESTERAS Negative        Imaging:  CT-ABDOMEN-PELVIS WITH   Final Result      1.  There is no acute inflammatory process within the abdomen or pelvis.   2.  There is diffuse hepatic fatty infiltration.   3.  The gallbladder is surgically absent without biliary dilatation.   4.  There is postoperative change involving the stomach most consistent with a gastric sleeve procedure.            Assessment/Plan:  I anticipate this patient is appropriate for observation status at this time.    * Gastroenteritis- (present on admission)  Assessment & Plan  IVF hydration  Check stool wbc, culture, OP    Dehydration- (present on admission)  Assessment & Plan  IVF hydration    Anemia- (present on admission)  Assessment & Plan  Follow cbc  Check iron/tibc, b12 and folate        VTE prophylaxis: SCD

## 2019-10-21 NOTE — ED TRIAGE NOTES
Ambulates to triage  Chief Complaint   Patient presents with   • Diarrhea     x3 days   • RLQ Pain     Saturday   • N/V     today     Her RLQ pain started on Saturday, but is much worse today.  Vomited 3x today and has been having liquid stools for 4 days.

## 2019-10-21 NOTE — ED PROVIDER NOTES
ED Provider Note    Scribed for Mckenzie Fraga M.D. by Jono Almanza. 10/21/2019, 12:03 PM.    Primary care provider: Pcp Pt States None  Means of arrival: walk in  History obtained from: patient  History limited by: none    CHIEF COMPLAINT  Chief Complaint   Patient presents with   • Diarrhea     x3 days   • RLQ Pain     Saturday   • N/V     today       HPI  Abril Vance is a 29 y.o. female who presents to the Emergency Department complaining of persistent right lower quadrant abdominal pain for the last 2 days. She describes her pain as waxing and waning in severity and has no improving or alleviating factors. Patient reports associated diarrhea for 3 days, nausea, vomiting. She states that she developed symptoms starting 3 days ago, followed by abdominal pain. Patient reports that she developed nausea, vomiting starting today, prompting her to come to the ED for evaluation. She reports a history of hysterectomy, gastric bypass, cholecystectomy. Patient denies fever, bloody stool, dysuria.  Denies other people at home being sick.  No vaginal discharge or vaginal bleeding.    REVIEW OF SYSTEMS  Pertinent positives include abdominal pain, nausea, vomiting, and diarrhea. Pertinent negatives include no fever, bloody stool, dysuria, vaginal discharge or vaginal bleeding, back pain, cough. All other systems reviewed and negative.     PAST MEDICAL HISTORY   has a past medical history of Arthritis, Gallstones, History of anemia, Migraines (4/5/2010), Morbid obesity (HCC) (4/5/2010), Sleep apnea, Snoring, and Vaginal trichomoniasis (4/5/2010).    SURGICAL HISTORY   has a past surgical history that includes lauren by laparoscopy (10/14/2010); other (2016); vaginal hysterectomy scope total (9/25/2018); salpingectomy (Bilateral, 9/25/2018); anterior and posterior repair (9/25/2018); enterocele repair (9/25/2018); bladder sling female (9/25/2018); and vaginal suspension (9/25/2018).    SOCIAL HISTORY  Social History  "    Tobacco Use   • Smoking status: Never Smoker   • Smokeless tobacco: Never Used   Substance Use Topics   • Alcohol use: No   • Drug use: No      Social History     Substance and Sexual Activity   Drug Use No       FAMILY HISTORY  None noted    CURRENT MEDICATIONS  Home Medications     Reviewed by Yareli Miramontes (Pharmacy Tech) on 10/21/19 at 1246  Med List Status: Complete   Medication Last Dose Status        Patient Juan Carlos Taking any Medications                       ALLERGIES  Allergies   Allergen Reactions   • Nkda [No Known Drug Allergy]        PHYSICAL EXAM  VITAL SIGNS: /71   Pulse 73   Temp 36.6 °C (97.9 °F) (Temporal)   Resp 14   Ht 1.6 m (5' 3\")   Wt 106.9 kg (235 lb 10.8 oz)   LMP 09/20/2018 (Approximate)   SpO2 98%   BMI 41.75 kg/m²     Constitutional: Appears uncomfortable. Well developed, Non-toxic appearance.   HENT: Normocephalic, Atraumatic, Bilateral external ears normal,Dry mucous membranes  Eyes: PERRL, EOMI, Conjunctiva normal,   Neck: Normal range of motion, No tenderness, Supple  Cardiovascular: Normal heart rate, Normal rhythm  Thorax & Lungs: Normal breath sounds, No respiratory distress  Abdomen: Right lower quadrant tenderness. no guarding min rebound, no distention. Positive heel tap.   Skin: Warm, Dry, No erythema, No rash.   Back: No tenderness, Right CVA tenderness.   Extremities: Intact distal pulses, No edema, No tenderness   Neurologic: Alert & oriented x 3, Normal motor function, Normal sensory function, No focal deficits noted.   Psychiatric: Appropriate                                                     DIAGNOSTIC STUDIES / PROCEDURES\    LABS  Results for orders placed or performed during the hospital encounter of 10/21/19   CBC WITH DIFFERENTIAL   Result Value Ref Range    WBC 6.0 4.8 - 10.8 K/uL    RBC 3.76 (L) 4.20 - 5.40 M/uL    Hemoglobin 9.7 (L) 12.0 - 16.0 g/dL    Hematocrit 32.5 (L) 37.0 - 47.0 %    MCV 86.4 81.4 - 97.8 fL    MCH 25.8 (L) 27.0 - 33.0 " pg    MCHC 29.8 (L) 33.6 - 35.0 g/dL    RDW 53.2 (H) 35.9 - 50.0 fL    Platelet Count 350 164 - 446 K/uL    MPV 10.1 9.0 - 12.9 fL    Neutrophils-Polys 62.70 44.00 - 72.00 %    Lymphocytes 26.20 22.00 - 41.00 %    Monocytes 8.50 0.00 - 13.40 %    Eosinophils 1.50 0.00 - 6.90 %    Basophils 0.80 0.00 - 1.80 %    Immature Granulocytes 0.30 0.00 - 0.90 %    Nucleated RBC 0.00 /100 WBC    Neutrophils (Absolute) 3.76 2.00 - 7.15 K/uL    Lymphs (Absolute) 1.57 1.00 - 4.80 K/uL    Monos (Absolute) 0.51 0.00 - 0.85 K/uL    Eos (Absolute) 0.09 0.00 - 0.51 K/uL    Baso (Absolute) 0.05 0.00 - 0.12 K/uL    Immature Granulocytes (abs) 0.02 0.00 - 0.11 K/uL    NRBC (Absolute) 0.00 K/uL    Hypochromia 1+     Anisocytosis 1+     Macrocytosis 1+    COMP METABOLIC PANEL   Result Value Ref Range    Sodium 140 135 - 145 mmol/L    Potassium 3.8 3.6 - 5.5 mmol/L    Chloride 107 96 - 112 mmol/L    Co2 24 20 - 33 mmol/L    Anion Gap 9.0 0.0 - 11.9    Glucose 83 65 - 99 mg/dL    Bun 5 (L) 8 - 22 mg/dL    Creatinine 0.58 0.50 - 1.40 mg/dL    Calcium 8.6 8.5 - 10.5 mg/dL    AST(SGOT) 27 12 - 45 U/L    ALT(SGPT) 22 2 - 50 U/L    Alkaline Phosphatase 155 (H) 30 - 99 U/L    Total Bilirubin 0.3 0.1 - 1.5 mg/dL    Albumin 4.2 3.2 - 4.9 g/dL    Total Protein 7.2 6.0 - 8.2 g/dL    Globulin 3.0 1.9 - 3.5 g/dL    A-G Ratio 1.4 g/dL   URINALYSIS (UA)   Result Value Ref Range    Color Yellow     Character Clear     Specific Gravity 1.010 <1.035    Ph 5.5 5.0 - 8.0    Glucose Negative Negative mg/dL    Ketones Negative Negative mg/dL    Protein Negative Negative mg/dL    Bilirubin Negative Negative    Urobilinogen, Urine 0.2 Negative    Nitrite Negative Negative    Leukocyte Esterase Negative Negative    Occult Blood Negative Negative    Micro Urine Req see below    ESTIMATED GFR   Result Value Ref Range    GFR If African American >60 >60 mL/min/1.73 m 2    GFR If Non African American >60 >60 mL/min/1.73 m 2   MORPHOLOGY   Result Value Ref Range    RBC  Morphology Present    PERIPHERAL SMEAR REVIEW   Result Value Ref Range    Peripheral Smear Review see below    PLATELET ESTIMATE   Result Value Ref Range    Plt Estimation Normal    DIFFERENTIAL COMMENT   Result Value Ref Range    Comments-Diff see below    All labs reviewed by me.    RADIOLOGY  CT-ABDOMEN-PELVIS WITH   Final Result      1.  There is no acute inflammatory process within the abdomen or pelvis.   2.  There is diffuse hepatic fatty infiltration.   3.  The gallbladder is surgically absent without biliary dilatation.   4.  There is postoperative change involving the stomach most consistent with a gastric sleeve procedure.      The radiologist's interpretation of all radiological studies have been reviewed by me.    COURSE & MEDICAL DECISION MAKING  Nursing notes, VS, PMSFHx reviewed in chart.     12:03 PM - Patient seen and examined at bedside. The patient presents with abdominal pain and the differential diagnosis includes but is not limited to kidney stone, gastroenteritis, appendicitis.  Patient has had a cholecystectomy as well as hysterectomy.  Ordered for CT abdomen, Urinalysis, CBC with differential, CMP, Morphology, Peripheral smear review, Platelet estimate, Differential comment to evaluate. Patient was treated with Zofran 4 mg, Morphine 4 mg for her symptoms.    Laboratory studies show normal white count without evidence of bandemia.  No evidence of significant electrolyte abnormality.  No evidence of a urinary tract infection.  No evidence of blood in her urine to suggest kidney stone.  Patient continues to require repeat pain medication due to the persistent pain.    2:08 PM - Patient reevaluated at bedside. Discussed results thus far. Patient will be treated with Dilaudid 0.5 mg.     2:37 PM - Patient will be treated with NS 1000 ml for diarrhea and clinical dehydration.      Patient reevaluated at bedside. Patient had a positive response to IV fluids with improved     Patient CT is returned  to does not show an exact etiology for the patient's pain.  She continues to have significant pain.  This point is unclear what is causing her pain.  Do not feel she stable for outpatient management due to her persistent pain.  This point I do not have any etiology that requires a surgical consultation.  This may just be a severe viral illness.  Plan is to continue IV fluids and do serial abdominal exams and continue to watch her closely.    Discussed case with Dr. Pratt who will admit her to the CDU.    FINAL IMPRESSION  1. Right lower quadrant abdominal pain    2. Non-intractable vomiting with nausea, unspecified vomiting type    3. Diarrhea of presumed infectious origin          Jono ARCHULETA (Scribe), am scribing for, and in the presence of, Mckenzie Fraga M.D..    Electronically signed by: Jono Almanza (Scribe), 10/21/2019    IMckenzie M.D. personally performed the services described in this documentation, as scribed by Jono Almanza in my presence, and it is both accurate and complete. C    The note accurately reflects work and decisions made by me.  Mckenzie Fraga  10/21/2019  4:07 PM

## 2019-10-22 ENCOUNTER — PATIENT OUTREACH (OUTPATIENT)
Dept: HEALTH INFORMATION MANAGEMENT | Facility: OTHER | Age: 29
End: 2019-10-22

## 2019-10-22 PROBLEM — D50.8 IRON DEFICIENCY ANEMIA SECONDARY TO INADEQUATE DIETARY IRON INTAKE: Status: ACTIVE | Noted: 2019-10-21

## 2019-10-22 LAB
ANION GAP SERPL CALC-SCNC: 7 MMOL/L (ref 0–11.9)
BASOPHILS # BLD AUTO: 1 % (ref 0–1.8)
BASOPHILS # BLD: 0.05 K/UL (ref 0–0.12)
BUN SERPL-MCNC: 5 MG/DL (ref 8–22)
CALCIUM SERPL-MCNC: 8 MG/DL (ref 8.5–10.5)
CHLORIDE SERPL-SCNC: 112 MMOL/L (ref 96–112)
CO2 SERPL-SCNC: 21 MMOL/L (ref 20–33)
CREAT SERPL-MCNC: 0.56 MG/DL (ref 0.5–1.4)
EOSINOPHIL # BLD AUTO: 0.13 K/UL (ref 0–0.51)
EOSINOPHIL NFR BLD: 2.6 % (ref 0–6.9)
ERYTHROCYTE [DISTWIDTH] IN BLOOD BY AUTOMATED COUNT: 54.4 FL (ref 35.9–50)
FOLATE SERPL-MCNC: 2.3 NG/ML
GLUCOSE SERPL-MCNC: 81 MG/DL (ref 65–99)
HCT VFR BLD AUTO: 28.3 % (ref 37–47)
HGB BLD-MCNC: 8.2 G/DL (ref 12–16)
IMM GRANULOCYTES # BLD AUTO: 0.02 K/UL (ref 0–0.11)
IMM GRANULOCYTES NFR BLD AUTO: 0.4 % (ref 0–0.9)
IRON SATN MFR SERPL: 3 % (ref 15–55)
IRON SERPL-MCNC: 14 UG/DL (ref 40–170)
LYMPHOCYTES # BLD AUTO: 1.86 K/UL (ref 1–4.8)
LYMPHOCYTES NFR BLD: 37.4 % (ref 22–41)
MCH RBC QN AUTO: 25.2 PG (ref 27–33)
MCHC RBC AUTO-ENTMCNC: 29 G/DL (ref 33.6–35)
MCV RBC AUTO: 87.1 FL (ref 81.4–97.8)
MONOCYTES # BLD AUTO: 0.48 K/UL (ref 0–0.85)
MONOCYTES NFR BLD AUTO: 9.7 % (ref 0–13.4)
NEUTROPHILS # BLD AUTO: 2.43 K/UL (ref 2–7.15)
NEUTROPHILS NFR BLD: 48.9 % (ref 44–72)
NRBC # BLD AUTO: 0 K/UL
NRBC BLD-RTO: 0 /100 WBC
PLATELET # BLD AUTO: 289 K/UL (ref 164–446)
PMV BLD AUTO: 10.4 FL (ref 9–12.9)
POTASSIUM SERPL-SCNC: 3.7 MMOL/L (ref 3.6–5.5)
RBC # BLD AUTO: 3.25 M/UL (ref 4.2–5.4)
SODIUM SERPL-SCNC: 140 MMOL/L (ref 135–145)
TIBC SERPL-MCNC: 427 UG/DL (ref 250–450)
VIT B12 SERPL-MCNC: 208 PG/ML (ref 211–911)
WBC # BLD AUTO: 5 K/UL (ref 4.8–10.8)
WBC STL QL MICRO: NORMAL

## 2019-10-22 PROCEDURE — 85025 COMPLETE CBC W/AUTO DIFF WBC: CPT

## 2019-10-22 PROCEDURE — 82607 VITAMIN B-12: CPT

## 2019-10-22 PROCEDURE — 700111 HCHG RX REV CODE 636 W/ 250 OVERRIDE (IP): Performed by: NURSE PRACTITIONER

## 2019-10-22 PROCEDURE — A9270 NON-COVERED ITEM OR SERVICE: HCPCS | Performed by: NURSE PRACTITIONER

## 2019-10-22 PROCEDURE — 83540 ASSAY OF IRON: CPT

## 2019-10-22 PROCEDURE — G0378 HOSPITAL OBSERVATION PER HR: HCPCS

## 2019-10-22 PROCEDURE — 700111 HCHG RX REV CODE 636 W/ 250 OVERRIDE (IP): Performed by: FAMILY MEDICINE

## 2019-10-22 PROCEDURE — 83550 IRON BINDING TEST: CPT

## 2019-10-22 PROCEDURE — 700105 HCHG RX REV CODE 258: Performed by: FAMILY MEDICINE

## 2019-10-22 PROCEDURE — 700102 HCHG RX REV CODE 250 W/ 637 OVERRIDE(OP): Performed by: FAMILY MEDICINE

## 2019-10-22 PROCEDURE — 80048 BASIC METABOLIC PNL TOTAL CA: CPT

## 2019-10-22 PROCEDURE — 96365 THER/PROPH/DIAG IV INF INIT: CPT

## 2019-10-22 PROCEDURE — A9270 NON-COVERED ITEM OR SERVICE: HCPCS | Performed by: FAMILY MEDICINE

## 2019-10-22 PROCEDURE — 96376 TX/PRO/DX INJ SAME DRUG ADON: CPT

## 2019-10-22 PROCEDURE — 99225 PR SUBSEQUENT OBSERVATION CARE,LEVEL II: CPT | Performed by: HOSPITALIST

## 2019-10-22 PROCEDURE — 36415 COLL VENOUS BLD VENIPUNCTURE: CPT

## 2019-10-22 PROCEDURE — 82746 ASSAY OF FOLIC ACID SERUM: CPT

## 2019-10-22 PROCEDURE — 96366 THER/PROPH/DIAG IV INF ADDON: CPT

## 2019-10-22 PROCEDURE — 700102 HCHG RX REV CODE 250 W/ 637 OVERRIDE(OP): Performed by: NURSE PRACTITIONER

## 2019-10-22 PROCEDURE — 700105 HCHG RX REV CODE 258: Performed by: NURSE PRACTITIONER

## 2019-10-22 RX ORDER — DIPHENHYDRAMINE HYDROCHLORIDE 50 MG/ML
25 INJECTION INTRAMUSCULAR; INTRAVENOUS ONCE
Status: COMPLETED | OUTPATIENT
Start: 2019-10-22 | End: 2019-10-22

## 2019-10-22 RX ORDER — DIPHENHYDRAMINE HCL 25 MG
25 TABLET ORAL ONCE
Status: COMPLETED | OUTPATIENT
Start: 2019-10-22 | End: 2019-10-22

## 2019-10-22 RX ORDER — ACETAMINOPHEN 325 MG/1
650 TABLET ORAL ONCE
Status: COMPLETED | OUTPATIENT
Start: 2019-10-22 | End: 2019-10-22

## 2019-10-22 RX ORDER — ACETAMINOPHEN 325 MG/1
650 TABLET ORAL EVERY 4 HOURS PRN
Status: SHIPPED | COMMUNITY
End: 2022-12-22

## 2019-10-22 RX ADMIN — ONDANSETRON 4 MG: 2 INJECTION INTRAMUSCULAR; INTRAVENOUS at 06:18

## 2019-10-22 RX ADMIN — ACETAMINOPHEN 650 MG: 325 TABLET, FILM COATED ORAL at 08:51

## 2019-10-22 RX ADMIN — OXYCODONE HYDROCHLORIDE 5 MG: 5 TABLET ORAL at 18:16

## 2019-10-22 RX ADMIN — OXYCODONE HYDROCHLORIDE 5 MG: 5 TABLET ORAL at 01:00

## 2019-10-22 RX ADMIN — OXYCODONE HYDROCHLORIDE 5 MG: 5 TABLET ORAL at 13:25

## 2019-10-22 RX ADMIN — SODIUM CHLORIDE: 9 INJECTION, SOLUTION INTRAVENOUS at 02:45

## 2019-10-22 RX ADMIN — DIPHENHYDRAMINE HCL 25 MG: 25 TABLET ORAL at 08:51

## 2019-10-22 RX ADMIN — SODIUM CHLORIDE 25 MG: 9 INJECTION, SOLUTION INTRAVENOUS at 09:30

## 2019-10-22 RX ADMIN — SODIUM CHLORIDE 1375 MG: 9 INJECTION, SOLUTION INTRAVENOUS at 11:55

## 2019-10-22 RX ADMIN — OXYCODONE HYDROCHLORIDE 5 MG: 5 TABLET ORAL at 06:20

## 2019-10-22 ASSESSMENT — ENCOUNTER SYMPTOMS
EYE PAIN: 0
DIZZINESS: 0
NAUSEA: 1
EYE DISCHARGE: 0
MEMORY LOSS: 0
DIARRHEA: 1
ABDOMINAL PAIN: 1
MYALGIAS: 0
HEADACHES: 0
COUGH: 0
SHORTNESS OF BREATH: 0
VOMITING: 0
SORE THROAT: 0
FEVER: 0
CHILLS: 0
FALLS: 0
DEPRESSION: 0

## 2019-10-22 ASSESSMENT — PAIN SCALES - WONG BAKER: WONGBAKER_NUMERICALRESPONSE: HURTS JUST A LITTLE BIT

## 2019-10-22 ASSESSMENT — PATIENT HEALTH QUESTIONNAIRE - PHQ9
SUM OF ALL RESPONSES TO PHQ9 QUESTIONS 1 AND 2: 0
1. LITTLE INTEREST OR PLEASURE IN DOING THINGS: NOT AT ALL
2. FEELING DOWN, DEPRESSED, IRRITABLE, OR HOPELESS: NOT AT ALL

## 2019-10-22 NOTE — PROGRESS NOTES
Assessment completed. Pt A&Ox4. Respirations are even and unlabored on RA. Pt reports 6/10 RLQ pain at this time-previously medicated. Nausea has decreased. Last BM was yesterday. Monitors applied, VS stable, call light and belongings within reach. POC updated (iv fluids, pain and nausea control). Pt educated on room and call light, pt verbalized understanding. Communication board updated. Needs met. Family at bedside.

## 2019-10-22 NOTE — PROGRESS NOTES
[facility] Medicine Daily Progress Note    Date of Service  10/22/2019    Chief Complaint  29 y.o. female admitted 10/21/2019 with abdominal pain and diarrhea    Hospital Course    Patient is a 29-year-old female with known medical history of arthritis, gallstones, anemia, migraines, morbid obesity, and sleep apnea.  Patient states she started having diarrhea approximately 3 days prior to admission that was loose and watery and continued to progress, this was accompanied with severe abdominal pain and nausea and vomiting.  Patient states she was out of the country in [place] approximately 2 weeks ago and feels she may have contracted something while there.  Patient CBC shows no elevation in white blood cell count however does show significant anemia with a hemoglobin of 9.7 and hematocrit of 32.5.  CMP was essentially benign and noncontributory.  CT the abdomen pelvis was completed in the emergency room which shows no acute findings at this time, diffuse hepatic fatty infiltration noted.  Patient was admitted to CDU and initiated on IV fluid hydration as well as symptomatic management.      Interval Problem Update  10/22- Patient resting in bed, states her diarrhea has slowed significantly and is now more of a soft bowel movement. Patient has been advanced on diet to full liquid. Patient continues to have abdominal pain requiring intervention. Anemia workup revealed low iron counts and patient was provided total body IV iron replacement. Will observe overnight and continue with symptom management. Stool cultures, ova and parasites pending.     Consultants/Specialty  None     Code Status  Full     Disposition  Likely home in am if abdominal pain has resolved and diarrhea controlled     Review of Systems  Review of Systems   Constitutional: Positive for malaise/fatigue. Negative for chills and fever.   HENT: Negative for congestion and sore throat.    Eyes: Negative for pain and discharge.   Respiratory: Negative for  cough and shortness of breath.    Cardiovascular: Negative for chest pain and leg swelling.   Gastrointestinal: Positive for abdominal pain, diarrhea and nausea. Negative for vomiting.   Genitourinary: Negative for dysuria, frequency and urgency.   Musculoskeletal: Negative for falls and myalgias.   Skin: Negative for rash.   Neurological: Negative for dizziness and headaches.   Psychiatric/Behavioral: Negative for depression and memory loss.        Physical Exam  Temp:  [36 °C (96.8 °F)-36.4 °C (97.6 °F)] 36.2 °C (97.2 °F)  Pulse:  [61-72] 72  Resp:  [16-18] 16  BP: ()/(50-63) 95/56  SpO2:  [95 %-98 %] 98 %    Physical Exam   Constitutional: She is oriented to person, place, and time. She appears well-developed. She is cooperative. No distress.   Pleasant obese female resting in bed in no acute distress    HENT:   Head: Normocephalic.   Mouth/Throat: Oropharynx is clear and moist.   Eyes: Conjunctivae and lids are normal.   Neck: No tracheal tenderness present.   Cardiovascular: Normal rate and normal heart sounds. Exam reveals no gallop.   Pulmonary/Chest: Effort normal and breath sounds normal. No respiratory distress. She has no decreased breath sounds. She has no wheezes.   Abdominal: Soft. Bowel sounds are normal. She exhibits no distension. There is tenderness in the right lower quadrant. There is no guarding.   Musculoskeletal:   FLOREZ   Neurological: She is alert and oriented to person, place, and time. She has normal strength.   Skin: Skin is warm and dry. She is not diaphoretic.   Psychiatric: She has a normal mood and affect. Her speech is normal and behavior is normal.   Nursing note and vitals reviewed.      Fluids    Intake/Output Summary (Last 24 hours) at 10/22/2019 1605  Last data filed at 10/22/2019 0700  Gross per 24 hour   Intake 1650 ml   Output 700 ml   Net 950 ml       Laboratory  Recent Labs     10/21/19  1150 10/22/19  0235   WBC 6.0 5.0   RBC 3.76* 3.25*   HEMOGLOBIN 9.7* 8.2*    HEMATOCRIT 32.5* 28.3*   MCV 86.4 87.1   MCH 25.8* 25.2*   MCHC 29.8* 29.0*   RDW 53.2* 54.4*   PLATELETCT 350 289   MPV 10.1 10.4     Recent Labs     10/21/19  1150 10/22/19  0235   SODIUM 140 140   POTASSIUM 3.8 3.7   CHLORIDE 107 112   CO2 24 21   GLUCOSE 83 81   BUN 5* 5*   CREATININE 0.58 0.56   CALCIUM 8.6 8.0*                   Imaging  CT-ABDOMEN-PELVIS WITH   Final Result      1.  There is no acute inflammatory process within the abdomen or pelvis.   2.  There is diffuse hepatic fatty infiltration.   3.  The gallbladder is surgically absent without biliary dilatation.   4.  There is postoperative change involving the stomach most consistent with a gastric sleeve procedure.           Assessment/Plan  * Gastroenteritis- (present on admission)  Assessment & Plan  IVF hydration  Symptom management with antiemetics and analgesics   Stool wbc, culture, OP- pending   Advance diet slowly as tolerated     Dehydration- (present on admission)  Assessment & Plan  IVF hydration   Monitor am labs     Iron deficiency anemia secondary to inadequate dietary iron intake- (present on admission)  Assessment & Plan  Trend CBC  Replace Iron levels with IV total replacement therapy        VTE prophylaxis: SCD, ambulation

## 2019-10-22 NOTE — PROGRESS NOTES
0230 Pt awake, OOB, up to bathroom w/ steady gait, voided well. Denies any diarrhea or loose stool. Reports mild tolerable abd/flank pain, declines any interventions at this time.    0235 AM labs drawn and sent to lab.

## 2019-10-22 NOTE — DISCHARGE PLANNING
Care Transition Team Assessment    Spoke with patient at bedside. Uses [facility] pharmacy on [facility] and [place]. Friend will be ride @ D/C. Lives with children under 18. Friend is watch children at present. Anticipate no needs @ present time.    Information Source  Orientation : Oriented x 4  Information Given By: Patient  Informant's Name: Abril Vance     Readmission Evaluation  Is this a readmission?: No    Interdisciplinary Discharge Planning  Does Admitting Nurse Feel This Could be a Complex Discharge?: No  Primary Care Physician: Dr. Faustin  Lives with - Patient's Self Care Capacity: Child Less than 18 Years of Age  Patient or legal guardian wants to designate a caregiver (see row info): No  Support Systems: Children, Family Member(s), Friends / Neighbors  Housing / Facility: Mobile Home  Do You Take your Prescribed Medications Regularly: Yes  Able to Return to Previous ADL's: Yes  Mobility Issues: No  Prior Services: Home-Independent  Patient Expects to be Discharged to:: Home  Assistance Needed: No  Durable Medical Equipment: Not Applicable    Discharge Preparedness  What are your discharge supports?: Child, Sibling, Other (comment)(Friends)  Prior Functional Level: Ambulatory    Functional Assesment  Prior Functional Level: Ambulatory    Finances  Prescription Coverage: Yes    Anticipated Discharge Information  Anticipated discharge disposition: Home  Discharge Address:  [address]   Discharge Contact Phone Number: [phone]

## 2019-10-22 NOTE — PROGRESS NOTES
0711 Received  bedside report from Ashli FELICIANO. Assumed pt care. Received pt in bed awake, family at bedside. Pt is AOx4, IVF infusing well. Reports mild abd pain at the moment, denies any nausea or vomiting or diarrhea. Plan of care reviewed and understood by patient. Encouraged to verbalize feelings. Patient questions answered. Promoted rest and relaxation. Safety measures in place. Comfort measures offered. Reinforced use of call light. Will continue to monitor.

## 2019-10-22 NOTE — PROGRESS NOTES
IV Iron Per Pharmacy Note    Patient Lean Body Weight:  54.2 kg (IDW)  Reason for Iron Replacement: Iron Deficiency Anemia    Lab Results   Component Value Date/Time    WBC 5.0 10/22/2019 02:35 AM    RBC 3.25 (L) 10/22/2019 02:35 AM    HEMOGLOBIN 8.2 (L) 10/22/2019 02:35 AM    HEMATOCRIT 28.3 (L) 10/22/2019 02:35 AM    MCV 87.1 10/22/2019 02:35 AM    MCH 25.2 (L) 10/22/2019 02:35 AM    MCHC 29.0 (L) 10/22/2019 02:35 AM    MPV 10.4 10/22/2019 02:35 AM       Recent Labs     10/22/19  0235   FOLATE 2.3*   IRON 14*         Recent Labs     10/22/19  0235   CREATININE 0.56          Assessment/Plan:  1. IV Iron Indicated.   2. Give Iron Dextran 25 mg IV test dose following diphenhydramine/acetaminophen premeds over 30 minutes per protocol.  3. If no reaction (Anaphylaxis, Hypotension/Hypertension, N/V/D, Chest pain/Back Pain, Urticaria/Pruritis) in the next hour, proceed to full dose. Nursing to call the pharmacy IV room at ext. [phone] for full dose.  4. Full dose: Iron Dextran 1375 mg IV over 4 hours. Continue to monitor for delayed ADR including: Arthralgia/myalgia, Headache/backache, chills/dizziness/malaise, moderate to high fever and n/v.      Mariposa Ashford, PharmD

## 2019-10-23 VITALS
HEART RATE: 62 BPM | WEIGHT: 235.89 LBS | RESPIRATION RATE: 16 BRPM | TEMPERATURE: 97.2 F | OXYGEN SATURATION: 95 % | BODY MASS INDEX: 41.8 KG/M2 | SYSTOLIC BLOOD PRESSURE: 95 MMHG | HEIGHT: 63 IN | DIASTOLIC BLOOD PRESSURE: 55 MMHG

## 2019-10-23 PROBLEM — E86.0 DEHYDRATION: Status: RESOLVED | Noted: 2019-10-21 | Resolved: 2019-10-23

## 2019-10-23 PROBLEM — K52.9 GASTROENTERITIS: Status: RESOLVED | Noted: 2019-10-21 | Resolved: 2019-10-23

## 2019-10-23 LAB
ANION GAP SERPL CALC-SCNC: 6 MMOL/L (ref 0–11.9)
BUN SERPL-MCNC: 4 MG/DL (ref 8–22)
CALCIUM SERPL-MCNC: 8.5 MG/DL (ref 8.5–10.5)
CHLORIDE SERPL-SCNC: 110 MMOL/L (ref 96–112)
CO2 SERPL-SCNC: 24 MMOL/L (ref 20–33)
CREAT SERPL-MCNC: 0.61 MG/DL (ref 0.5–1.4)
E COLI SXT1+2 STL IA: NORMAL
ERYTHROCYTE [DISTWIDTH] IN BLOOD BY AUTOMATED COUNT: 54.1 FL (ref 35.9–50)
GLUCOSE SERPL-MCNC: 82 MG/DL (ref 65–99)
HCT VFR BLD AUTO: 29.4 % (ref 37–47)
HGB BLD-MCNC: 8.8 G/DL (ref 12–16)
MCH RBC QN AUTO: 26 PG (ref 27–33)
MCHC RBC AUTO-ENTMCNC: 29.9 G/DL (ref 33.6–35)
MCV RBC AUTO: 87 FL (ref 81.4–97.8)
PLATELET # BLD AUTO: 271 K/UL (ref 164–446)
PMV BLD AUTO: 10 FL (ref 9–12.9)
POTASSIUM SERPL-SCNC: 3.9 MMOL/L (ref 3.6–5.5)
RBC # BLD AUTO: 3.38 M/UL (ref 4.2–5.4)
SIGNIFICANT IND 70042: NORMAL
SITE SITE: NORMAL
SODIUM SERPL-SCNC: 140 MMOL/L (ref 135–145)
SOURCE SOURCE: NORMAL
WBC # BLD AUTO: 4.7 K/UL (ref 4.8–10.8)

## 2019-10-23 PROCEDURE — 700102 HCHG RX REV CODE 250 W/ 637 OVERRIDE(OP): Performed by: FAMILY MEDICINE

## 2019-10-23 PROCEDURE — 36415 COLL VENOUS BLD VENIPUNCTURE: CPT

## 2019-10-23 PROCEDURE — 85027 COMPLETE CBC AUTOMATED: CPT

## 2019-10-23 PROCEDURE — 80048 BASIC METABOLIC PNL TOTAL CA: CPT

## 2019-10-23 PROCEDURE — G0378 HOSPITAL OBSERVATION PER HR: HCPCS

## 2019-10-23 PROCEDURE — A9270 NON-COVERED ITEM OR SERVICE: HCPCS | Performed by: FAMILY MEDICINE

## 2019-10-23 PROCEDURE — 700105 HCHG RX REV CODE 258: Performed by: FAMILY MEDICINE

## 2019-10-23 PROCEDURE — 99217 PR OBSERVATION CARE DISCHARGE: CPT | Performed by: HOSPITALIST

## 2019-10-23 RX ADMIN — SODIUM CHLORIDE: 9 INJECTION, SOLUTION INTRAVENOUS at 08:03

## 2019-10-23 RX ADMIN — OXYCODONE HYDROCHLORIDE 5 MG: 5 TABLET ORAL at 00:14

## 2019-10-23 RX ADMIN — ACETAMINOPHEN 650 MG: 325 TABLET, FILM COATED ORAL at 07:57

## 2019-10-23 ASSESSMENT — PAIN SCALES - WONG BAKER
WONGBAKER_NUMERICALRESPONSE: HURTS JUST A LITTLE BIT
WONGBAKER_NUMERICALRESPONSE: HURTS JUST A LITTLE BIT

## 2019-10-23 NOTE — DISCHARGE PLANNING
Patient is eligible for Medicaid Meds to Beds at discharge if they have coverage with [place] Medicaid, Medicaid FFS, Medicaid HMO ([facility]), or [place]. This service is provided through the [facility] if orders are received by the pharmacy prior to 4pm Monday through Friday. Preferred pharmacy has been changed to [facility]. Please call [phone] prior to discharge.

## 2019-10-23 NOTE — PROGRESS NOTES
Report received, pt care assumed. Pt ambulate to restroom with steady gait. Pt c/o HA, pt medicated per MAR.Pt denies any additional needs at this time. Will continue to monitor.

## 2019-10-23 NOTE — PROGRESS NOTES
Discharge instructions, medications and follow-up reviewed with pt, pt verbalized understanding and denies questions. Discharge paperwork given to pt. PIV removed, TeleBox removed, armband removed.

## 2019-10-23 NOTE — DISCHARGE INSTRUCTIONS
Discharge Instructions    Discharged to home by car with relative. Discharged via wheelchair, hospital escort: Yes.  Special equipment needed: Not Applicable    Be sure to schedule a follow-up appointment with your primary care doctor or any specialists as instructed.     Discharge Plan:   Diet Plan: Discussed  Activity Level: Discussed  Confirmed Follow up Appointment: Patient to Call and Schedule Appointment  Confirmed Symptoms Management: Discussed  Medication Reconciliation Updated: Yes  Influenza Vaccine Indication: Indicated: 9 to 64 years of age  Influenza Vaccine Given - only chart on this line when given: Influenza Vaccine Given (See MAR)    I understand that a diet low in cholesterol, fat, and sodium is recommended for good health. Unless I have been given specific instructions below for another diet, I accept this instruction as my diet prescription.   Other diet: Regular    Special Instructions: None    · Is patient discharged on Warfarin / Coumadin?   No     Depression / Suicide Risk    As you are discharged from this [facility] facility, it is important to learn how to keep safe from harming yourself.    Recognize the warning signs:  · Abrupt changes in personality, positive or negative- including increase in energy   · Giving away possessions  · Change in eating patterns- significant weight changes-  positive or negative  · Change in sleeping patterns- unable to sleep or sleeping all the time   · Unwillingness or inability to communicate  · Depression  · Unusual sadness, discouragement and loneliness  · Talk of wanting to die  · Neglect of personal appearance   · Rebelliousness- reckless behavior  · Withdrawal from people/activities they love  · Confusion- inability to concentrate     If you or a loved one observes any of these behaviors or has concerns about self-harm, here's what you can do:  · Talk about it- your feelings and reasons for harming yourself  · Remove any means that you might use to  hurt yourself (examples: pills, rope, extension cords, firearm)  · Get professional help from the community (Mental Health, Substance Abuse, psychological counseling)  · Do not be alone:Call your Safe Contact- someone whom you trust who will be there for you.  · Call your local CRISIS HOTLINE 107-6010 or 007-847-3011  · Call your local Children's Mobile Crisis Response Team [place] (371) 688-5724 or www.[employer]  · Call the toll free National Suicide Prevention Hotlines   · National Suicide Prevention Lifeline 757-266-NWOY (0931)  · National Hope Line Network 800-SUICIDE (541-0044)      Discharge Instructions per Kaylie Seth, A.P.R.N.    Please stay well hydrated, follow up with PCP   DIET: As tolerated   ACTIVITY: As tolerated   DIAGNOSIS: Gastroenteritis   Return to ER if increased abdominal pain, nausea, vomiting or recurrent diarrhea

## 2019-10-23 NOTE — CARE PLAN
Problem: Pain Management  Goal: Pain level will decrease to patient's comfort goal  Outcome: PROGRESSING AS EXPECTED  Intervention: Follow pain managment plan developed in collaboration with patient and Interdisciplinary Team  Note:   Administer prescribed pain meds.     Problem: Infection  Goal: Will remain free from infection  Outcome: PROGRESSING AS EXPECTED  Intervention: Assess signs and symptoms of infection  Note:   Practice aseptic technique.

## 2019-10-23 NOTE — DISCHARGE SUMMARY
Discharge Summary    CHIEF COMPLAINT ON ADMISSION  Chief Complaint   Patient presents with   • Diarrhea     x3 days   • RLQ Pain     Saturday   • N/V     today       Reason for Admission  Diarrhea; R Flank Pain; Vomiting; *     Admission Date  10/21/2019    CODE STATUS  Full Code    HPI & HOSPITAL COURSE    Patient is a 29-year-old female with known medical history of arthritis, gallstones, anemia, migraines, morbid obesity, and sleep apnea.  Patient states she started having diarrhea approximately 3 days prior to admission that was loose and watery and continued to progress, this was accompanied with severe abdominal pain and nausea and vomiting.  Patient states she was out of the country in [place] approximately 2 weeks ago and feels she may have contracted something while there.  Patient CBC shows no elevation in white blood cell count however does show significant anemia with a hemoglobin of 9.7 and hematocrit of 32.5.  CMP was essentially benign and noncontributory.  CT the abdomen pelvis was completed in the emergency room which shows no acute findings at this time, diffuse hepatic fatty infiltration noted.  Patient was admitted to CDU and initiated on IV fluid hydration as well as symptomatic management.  Stool cultures were sent to the lab.  Patient was initiated on clear liquid diet and advanced as tolerated.  Patient's diarrhea continued to improve and become more formed.  Anemia work-up revealed significantly low iron levels and total body iron replacement therapy via IV was completed.  Patient states she has had significant problems with iron absorption since her bariatric surgery with Dr. Garcia.  On exam this morning patient states her abdominal pain has resolved and she is no longer having diarrhea.  Patient states she feels she is ready to discharge at this time.  Stool cultures were negative.  Patient encouraged to stay well-hydrated at home and to continue to advance as tolerated.  Patient's vital  signs are stable.  Patient is up ambulating independently.  Patient is maintaining oxygen saturations on room air.  Patient recommended to follow-up with her PCP.     Therefore, she is discharged in good and stable condition to home with close outpatient follow-up.        Discharge Date  10/23/2019    FOLLOW UP ITEMS POST DISCHARGE  Please follow up with PCP   Stay well hydrated     DISCHARGE DIAGNOSES  Principal Problem:    Gastroenteritis POA: Yes  Active Problems:    Dehydration POA: Yes    Iron deficiency anemia secondary to inadequate dietary iron intake POA: Yes  Resolved Problems:    * No resolved hospital problems. *      FOLLOW UP    Primary care provider      Please call to schedule a hospital follow up appointment with your Primary care provder as soon as possible. Thank you.       MEDICATIONS ON DISCHARGE     Medication List      CONTINUE taking these medications      Instructions   IRON PO   Take  by mouth.     TYLENOL 325 MG Tabs  Generic drug:  acetaminophen   Take 650 mg by mouth every four hours as needed for Mild Pain.  Dose:  650 mg            Allergies  Allergies   Allergen Reactions   • Nkda [No Known Drug Allergy]        DIET  Orders Placed This Encounter   Procedures   • Diet Order Full Liquid     Standing Status:   Standing     Number of Occurrences:   1     Order Specific Question:   Diet:     Answer:   Full Liquid [11]       ACTIVITY  As tolerated.  Weight bearing as tolerated    CONSULTATIONS  None     PROCEDURES  None     LABORATORY  Lab Results   Component Value Date    SODIUM 140 10/23/2019    POTASSIUM 3.9 10/23/2019    CHLORIDE 110 10/23/2019    CO2 24 10/23/2019    GLUCOSE 82 10/23/2019    BUN 4 (L) 10/23/2019    CREATININE 0.61 10/23/2019    CREATININE 0.7 04/28/2009        Lab Results   Component Value Date    WBC 4.7 (L) 10/23/2019    HEMOGLOBIN 8.8 (L) 10/23/2019    HEMATOCRIT 29.4 (L) 10/23/2019    PLATELETCT 271 10/23/2019

## 2019-10-23 NOTE — CARE PLAN
Pt instructed on use of call light and asked to call when needing to get up. Pt agrees. Call light and personal belongings within reach of pt. Pt denies any needs at this time. Will continue to monitor.

## 2019-10-24 LAB
BACTERIA STL CULT: NORMAL
E COLI SXT1+2 STL IA: NORMAL
O+P STL MICRO: NEGATIVE
O+P STL TRI STN: NEGATIVE
SIGNIFICANT IND 70042: NORMAL
SITE SITE: NORMAL
SOURCE SOURCE: NORMAL

## 2020-03-06 ENCOUNTER — HOSPITAL ENCOUNTER (EMERGENCY)
Facility: MEDICAL CENTER | Age: 30
End: 2020-03-06
Attending: EMERGENCY MEDICINE
Payer: MEDICAID

## 2020-03-06 VITALS
OXYGEN SATURATION: 97 % | DIASTOLIC BLOOD PRESSURE: 82 MMHG | RESPIRATION RATE: 18 BRPM | WEIGHT: 231.26 LBS | SYSTOLIC BLOOD PRESSURE: 127 MMHG | BODY MASS INDEX: 40.98 KG/M2 | HEIGHT: 63 IN | TEMPERATURE: 97.3 F | HEART RATE: 62 BPM

## 2020-03-06 DIAGNOSIS — N39.0 ACUTE URINARY TRACT INFECTION: ICD-10-CM

## 2020-03-06 LAB
APPEARANCE UR: ABNORMAL
COLOR UR AUTO: YELLOW
GLUCOSE UR QL STRIP.AUTO: NEGATIVE MG/DL
HCG UR QL: NEGATIVE
KETONES UR QL STRIP.AUTO: NEGATIVE MG/DL
LEUKOCYTE ESTERASE UR QL STRIP.AUTO: ABNORMAL
NITRITE UR QL STRIP.AUTO: NEGATIVE
PH UR STRIP.AUTO: 5.5 [PH] (ref 5–8)
PROT UR QL STRIP: 30 MG/DL
RBC UR QL AUTO: ABNORMAL
SP GR UR: 1.01 (ref 1–1.03)

## 2020-03-06 PROCEDURE — 700111 HCHG RX REV CODE 636 W/ 250 OVERRIDE (IP): Performed by: EMERGENCY MEDICINE

## 2020-03-06 PROCEDURE — 87086 URINE CULTURE/COLONY COUNT: CPT

## 2020-03-06 PROCEDURE — A9270 NON-COVERED ITEM OR SERVICE: HCPCS | Performed by: EMERGENCY MEDICINE

## 2020-03-06 PROCEDURE — 81025 URINE PREGNANCY TEST: CPT

## 2020-03-06 PROCEDURE — 700102 HCHG RX REV CODE 250 W/ 637 OVERRIDE(OP): Performed by: EMERGENCY MEDICINE

## 2020-03-06 PROCEDURE — 99284 EMERGENCY DEPT VISIT MOD MDM: CPT

## 2020-03-06 PROCEDURE — 81002 URINALYSIS NONAUTO W/O SCOPE: CPT

## 2020-03-06 RX ORDER — ONDANSETRON 4 MG/1
4 TABLET, ORALLY DISINTEGRATING ORAL ONCE
Status: COMPLETED | OUTPATIENT
Start: 2020-03-06 | End: 2020-03-06

## 2020-03-06 RX ORDER — OXYCODONE AND ACETAMINOPHEN 10; 325 MG/1; MG/1
1 TABLET ORAL ONCE
Status: COMPLETED | OUTPATIENT
Start: 2020-03-06 | End: 2020-03-06

## 2020-03-06 RX ORDER — PHENAZOPYRIDINE HYDROCHLORIDE 200 MG/1
200 TABLET, FILM COATED ORAL 3 TIMES DAILY PRN
Qty: 6 TAB | Refills: 0 | Status: SHIPPED | OUTPATIENT
Start: 2020-03-06 | End: 2020-03-08

## 2020-03-06 RX ORDER — OXYCODONE HYDROCHLORIDE AND ACETAMINOPHEN 5; 325 MG/1; MG/1
1 TABLET ORAL EVERY 4 HOURS PRN
Qty: 20 TAB | Refills: 0 | Status: SHIPPED | OUTPATIENT
Start: 2020-03-06 | End: 2020-03-11

## 2020-03-06 RX ORDER — PHENTERMINE HYDROCHLORIDE 37.5 MG/1
37.5 TABLET ORAL
Status: SHIPPED | COMMUNITY
End: 2023-04-19

## 2020-03-06 RX ORDER — SULFAMETHOXAZOLE AND TRIMETHOPRIM 800; 160 MG/1; MG/1
1 TABLET ORAL 2 TIMES DAILY
Qty: 14 TAB | Refills: 0 | Status: SHIPPED | OUTPATIENT
Start: 2020-03-06 | End: 2020-03-13

## 2020-03-06 RX ADMIN — ONDANSETRON 4 MG: 4 TABLET, ORALLY DISINTEGRATING ORAL at 11:19

## 2020-03-06 RX ADMIN — OXYCODONE HYDROCHLORIDE AND ACETAMINOPHEN 1 TABLET: 10; 325 TABLET ORAL at 11:19

## 2020-03-06 ASSESSMENT — LIFESTYLE VARIABLES
DO YOU DRINK ALCOHOL: NO
ON A TYPICAL DAY WHEN YOU DRINK ALCOHOL HOW MANY DRINKS DO YOU HAVE: 0
HAVE YOU EVER FELT YOU SHOULD CUT DOWN ON YOUR DRINKING: NO
EVER HAD A DRINK FIRST THING IN THE MORNING TO STEADY YOUR NERVES TO GET RID OF A HANGOVER: NO
EVER FELT BAD OR GUILTY ABOUT YOUR DRINKING: NO
HAVE PEOPLE ANNOYED YOU BY CRITICIZING YOUR DRINKING: NO
CONSUMPTION TOTAL: NEGATIVE
TOTAL SCORE: 0
TOTAL SCORE: 0
HOW MANY TIMES IN THE PAST YEAR HAVE YOU HAD 5 OR MORE DRINKS IN A DAY: 0
TOTAL SCORE: 0
AVERAGE NUMBER OF DAYS PER WEEK YOU HAVE A DRINK CONTAINING ALCOHOL: 0

## 2020-03-06 ASSESSMENT — FIBROSIS 4 INDEX: FIB4 SCORE: 0.66

## 2020-03-06 NOTE — ED TRIAGE NOTES
..  Chief Complaint   Patient presents with   • Flank Pain     R flank pain x's 7 days. pressure when urinating. increase pain w/ urination. reports nausea with vomitting.    • Headache       .  Vitals:    03/06/20 1036   BP: 114/86   Pulse: 60   Resp: 16   Temp: 36.3 °C (97.3 °F)   SpO2: 99%       .Explained triage process, to waiting room. Asked to inform RN if questions or concerns arise. Pt given specimen cup for urine

## 2020-03-06 NOTE — ED NOTES
Pt and RN discussed d.c education, importance of following rx instructions and when to seek emergency medical attention. Pt states she has a ride home with family. RX given.

## 2020-03-06 NOTE — ED NOTES
Pt has not been feeling well for 1 week. She noticed blood in her urine for 2 days, then it went away. She has pain when she voids, chills, and dizziness with nausea and vomiting.

## 2020-03-06 NOTE — ED PROVIDER NOTES
"ED Provider Note    Scribed for Landon Helton M.D. by Lillian Jain. 3/6/2020, 11:00 AM.    Primary care provider: Pcp Pt States None  Means of arrival: Walk-in  History obtained from: Patient  History limited by: None    CHIEF COMPLAINT  Chief Complaint   Patient presents with   • Flank Pain     R flank pain x's 7 days. pressure when urinating. increase pain w/ urination. reports nausea with vomitting.    • Headache       HPI  Abril Vance is a 29 y.o. female who presents to the Emergency Department for constant worsening left sided flank pain onset 1 week ago. She reports hematuria for the first 2 days of her symptoms, which has since resolved. She endorses associated dysuria and urinary frequency, but denies any cough. She describes her pain with urination as, \"pressure.\" She states she took Tylenol this morning at 6:30 AM, with little to no alleviation. She has a past medical history of bladder infections. She also notes that she also has had vomiting following eating. Patient has a past surgical history of Gastric sleeve and cholecystectomy.     REVIEW OF SYSTEMS  Pertinent positives include left sided flank pain, hematuria, dysuria, urinary frequency, and vomiting. Pertinent negatives include no cough. As above, all other systems reviewed and are negative.   See HPI for further details.     PAST MEDICAL HISTORY   has a past medical history of Arthritis, Gallstones, History of anemia, Migraines (4/5/2010), Morbid obesity (HCC) (4/5/2010), Sleep apnea, Snoring, and Vaginal trichomoniasis (4/5/2010).    SURGICAL HISTORY   has a past surgical history that includes lauren by laparoscopy (10/14/2010); other (2016); vaginal hysterectomy scope total (9/25/2018); salpingectomy (Bilateral, 9/25/2018); anterior and posterior repair (9/25/2018); enterocele repair (9/25/2018); bladder sling female (9/25/2018); and vaginal suspension (9/25/2018).    SOCIAL HISTORY  Social History     Tobacco Use   • Smoking status: " "Never Smoker   • Smokeless tobacco: Never Used   Substance Use Topics   • Alcohol use: No   • Drug use: No      Social History     Substance and Sexual Activity   Drug Use No       FAMILY HISTORY  Family History   Family history unknown: Yes       CURRENT MEDICATIONS  Home Medications     Reviewed by Janell Alvarez R.N. (Registered Nurse) on 03/06/20 at 1039  Med List Status: Not Addressed   Medication Last Dose Status   acetaminophen (TYLENOL) 325 MG Tab  Active   IRON PO  Active   phentermine (ADIPEX-P) 37.5 MG tablet  Active                ALLERGIES  Allergies   Allergen Reactions   • Nkda [No Known Drug Allergy]        PHYSICAL EXAM  VITAL SIGNS: /86   Pulse 60   Temp 36.3 °C (97.3 °F) (Oral)   Resp 16   Ht 1.6 m (5' 3\") Comment: Simultaneous filing. User may not have seen previous data.  Wt 104.9 kg (231 lb 4.2 oz)   LMP 09/20/2018 (Approximate)   SpO2 99%   BMI 40.97 kg/m²   Vitals reviewed.  Constitutional: Alert in no apparent distress.  HENT: No signs of trauma, Bilateral external ears normal, Nose normal.   Eyes: Pupils are equal and reactive, Conjunctiva normal, Non-icteric.   Neck: Normal range of motion, No tenderness, Supple, No stridor.   Lymphatic: No lymphadenopathy noted.   Cardiovascular: Regular rate and rhythm, no murmurs.   Thorax & Lungs: Normal breath sounds, No respiratory distress, No wheezing, No chest tenderness.   Abdomen: Positive left CVA tenderness, Bowel sounds normal, Soft, No peritoneal signs, No masses, No pulsatile masses.   Skin: Warm, Dry, No erythema, No rash.   Back: No bony tenderness, No CVA tenderness.   Extremities: Intact distal pulses, No edema, No tenderness, No cyanosis  Musculoskeletal: Good range of motion in all major joints. No tenderness to palpation or major deformities noted.   Neurologic: Alert , Normal motor function, Normal sensory function, No focal deficits noted.   Psychiatric: Affect normal, Judgment normal, Mood normal.     DIAGNOSTIC " STUDIES / PROCEDURES    LABS  Labs Reviewed   POC UA - Abnormal; Notable for the following components:       Result Value    POC Appearance Cloudy (*)     POC Blood Large (*)     POC Protein 30 (*)     POC Leukocyte Esterase Small (*)     All other components within normal limits   URINE CULTURE(NEW)    Narrative:     Indication for culture:->Patient WITHOUT an indwelling Cabral  catheter in place with new onset of Dysuria, Frequency,  Urgency, and/or Suprapubic pain   POC URINE PREGNANCY      All labs reviewed by me.    COURSE & MEDICAL DECISION MAKING  Nursing notes, VS, PMSFHx reviewed in chart.  Differential diagnoses include but not limited to: UTI     Obtained and reviewed past medical records from the past year which indicated no narcotics.    11:00 AM Patient seen and examined at bedside. Patient states she has someone coming to pick her up, and would like pain medication. I discussed with her that her symptoms are consistent with UTI. Ordered for POC UA to evaluate. Patient will be treated with Percocet  mg and Zofran 4 mg for her symptoms.      11:19 AM - Patient's urine dip is positive for infection, pregnancy is negative.     11:36 AM - Patient was seen at bedside. I informed her that her urine has come back positive for a urinary tract infection. She was educated on at home care for her infection. I advised her of all return precautions. Patient verbalizes understanding and agreement to this plan of care.     In prescribing controlled substances to this patient, I certify that I have obtained and reviewed the medical history of Abril Vance. I have also made a good chris effort to obtain applicable records from other providers who have treated the patient and records did not demonstrate any increased risk of substance abuse that would prevent me from prescribing controlled substances.     I have conducted a physical exam and documented it. I have reviewed Ms. Vance’s prescription history as  maintained by the [place] Prescription Monitoring Program.     I have assessed the patient’s risk for abuse, dependency, and addiction using the validated Opioid Risk Tool available at https://www.mdcalc.com/kldnvg-fuml-psns-ort-narcotic-abuse.     Given the above, I believe the benefits of controlled substance therapy outweigh the risks. The reasons for prescribing controlled substances include non-narcotic, oral analgesic alternatives have been inadequate for pain control. The patient took tylenol at 6a which did not help.  Accordingly, I have discussed the risk and benefits, treatment plan, and alternative therapies with the patient.     I reviewed prescription monitoring program for patient's narcotic use before prescribing a scheduled drug.The patient will not drink alcohol nor drive with prescribed medications. The patient will return for new or worsening symptoms and is stable at the time of discharge.    The patient is referred to a primary physician for blood pressure management, diabetic screening, and for all other preventative health concerns.    DISPOSITION:  Patient will be discharged home in stable condition.    FOLLOW UP:  [facility], Emergency Dept  [address] [address] [phone]    If symptoms worsen    [address] [address]  [phone]    call to establish a primary care doctor if you do not already have one      OUTPATIENT MEDICATIONS:  New Prescriptions    OXYCODONE-ACETAMINOPHEN (PERCOCET) 5-325 MG TAB    Take 1 Tab by mouth every four hours as needed (pain) for up to 5 days. No driving, no drinking alcohol    PHENAZOPYRIDINE (PYRIDIUM) 200 MG TAB    Take 1 Tab by mouth 3 times a day as needed (with food) for up to 2 days.    SULFAMETHOXAZOLE-TRIMETHOPRIM (BACTRIM DS) 800-160 MG TABLET    Take 1 Tab by mouth 2 times a day for 7 days.       FINAL IMPRESSION  1. Acute urinary tract infection          Lillian ARCHULETA  (Scribe), am scribing for, and in the presence of, Landon Helton M.D..    Electronically signed by: Lillian Jain (Kareemibni), 3/6/2020    ILandon M.D. personally performed the services described in this documentation, as scribed by Lillian Jain in my presence, and it is both accurate and complete. C.    The note accurately reflects work and decisions made by me.  Landon Helton M.D.  3/6/2020  11:48 AM

## 2020-03-08 LAB
BACTERIA UR CULT: NORMAL
SIGNIFICANT IND 70042: NORMAL
SITE SITE: NORMAL
SOURCE SOURCE: NORMAL

## 2020-04-20 ENCOUNTER — HOSPITAL ENCOUNTER (EMERGENCY)
Facility: MEDICAL CENTER | Age: 30
End: 2020-04-20
Attending: EMERGENCY MEDICINE
Payer: MEDICAID

## 2020-04-20 ENCOUNTER — APPOINTMENT (OUTPATIENT)
Dept: RADIOLOGY | Facility: MEDICAL CENTER | Age: 30
End: 2020-04-20
Attending: EMERGENCY MEDICINE
Payer: MEDICAID

## 2020-04-20 VITALS
HEART RATE: 65 BPM | OXYGEN SATURATION: 99 % | DIASTOLIC BLOOD PRESSURE: 56 MMHG | HEIGHT: 63 IN | TEMPERATURE: 97.3 F | WEIGHT: 227.29 LBS | RESPIRATION RATE: 16 BRPM | SYSTOLIC BLOOD PRESSURE: 108 MMHG | BODY MASS INDEX: 40.27 KG/M2

## 2020-04-20 DIAGNOSIS — F07.81 POST CONCUSSION SYNDROME: ICD-10-CM

## 2020-04-20 DIAGNOSIS — R11.2 NON-INTRACTABLE VOMITING WITH NAUSEA, UNSPECIFIED VOMITING TYPE: ICD-10-CM

## 2020-04-20 LAB
ALBUMIN SERPL BCP-MCNC: 5.8 G/DL (ref 3.2–4.9)
ALBUMIN/GLOB SERPL: 1.3 G/DL
ALP SERPL-CCNC: 200 U/L (ref 30–99)
ALT SERPL-CCNC: 28 U/L (ref 2–50)
ANION GAP SERPL CALC-SCNC: 19 MMOL/L (ref 7–16)
APTT PPP: 32.5 SEC (ref 24.7–36)
AST SERPL-CCNC: 35 U/L (ref 12–45)
BASOPHILS # BLD AUTO: 0.8 % (ref 0–1.8)
BASOPHILS # BLD: 0.06 K/UL (ref 0–0.12)
BILIRUB SERPL-MCNC: 0.8 MG/DL (ref 0.1–1.5)
BUN SERPL-MCNC: 9 MG/DL (ref 8–22)
CALCIUM SERPL-MCNC: 10.3 MG/DL (ref 8.5–10.5)
CHLORIDE SERPL-SCNC: 98 MMOL/L (ref 96–112)
CO2 SERPL-SCNC: 22 MMOL/L (ref 20–33)
CREAT SERPL-MCNC: 0.67 MG/DL (ref 0.5–1.4)
EOSINOPHIL # BLD AUTO: 0.06 K/UL (ref 0–0.51)
EOSINOPHIL NFR BLD: 0.8 % (ref 0–6.9)
ERYTHROCYTE [DISTWIDTH] IN BLOOD BY AUTOMATED COUNT: 52.6 FL (ref 35.9–50)
GLOBULIN SER CALC-MCNC: 4.4 G/DL (ref 1.9–3.5)
GLUCOSE SERPL-MCNC: 86 MG/DL (ref 65–99)
HCT VFR BLD AUTO: 49.1 % (ref 37–47)
HGB BLD-MCNC: 16.3 G/DL (ref 12–16)
IMM GRANULOCYTES # BLD AUTO: 0.02 K/UL (ref 0–0.11)
IMM GRANULOCYTES NFR BLD AUTO: 0.3 % (ref 0–0.9)
INR PPP: 0.94 (ref 0.87–1.13)
LYMPHOCYTES # BLD AUTO: 1.95 K/UL (ref 1–4.8)
LYMPHOCYTES NFR BLD: 26 % (ref 22–41)
MCH RBC QN AUTO: 34.6 PG (ref 27–33)
MCHC RBC AUTO-ENTMCNC: 33.2 G/DL (ref 33.6–35)
MCV RBC AUTO: 104.2 FL (ref 81.4–97.8)
MONOCYTES # BLD AUTO: 0.35 K/UL (ref 0–0.85)
MONOCYTES NFR BLD AUTO: 4.7 % (ref 0–13.4)
NEUTROPHILS # BLD AUTO: 5.07 K/UL (ref 2–7.15)
NEUTROPHILS NFR BLD: 67.4 % (ref 44–72)
NRBC # BLD AUTO: 0 K/UL
NRBC BLD-RTO: 0 /100 WBC
PLATELET # BLD AUTO: 258 K/UL (ref 164–446)
PMV BLD AUTO: 9.9 FL (ref 9–12.9)
POTASSIUM SERPL-SCNC: 3.8 MMOL/L (ref 3.6–5.5)
PROT SERPL-MCNC: 10.2 G/DL (ref 6–8.2)
PROTHROMBIN TIME: 12.8 SEC (ref 12–14.6)
RBC # BLD AUTO: 4.71 M/UL (ref 4.2–5.4)
SODIUM SERPL-SCNC: 139 MMOL/L (ref 135–145)
WBC # BLD AUTO: 7.5 K/UL (ref 4.8–10.8)

## 2020-04-20 PROCEDURE — 99284 EMERGENCY DEPT VISIT MOD MDM: CPT

## 2020-04-20 PROCEDURE — 96376 TX/PRO/DX INJ SAME DRUG ADON: CPT

## 2020-04-20 PROCEDURE — 96375 TX/PRO/DX INJ NEW DRUG ADDON: CPT

## 2020-04-20 PROCEDURE — 85610 PROTHROMBIN TIME: CPT

## 2020-04-20 PROCEDURE — 85025 COMPLETE CBC W/AUTO DIFF WBC: CPT

## 2020-04-20 PROCEDURE — 85730 THROMBOPLASTIN TIME PARTIAL: CPT

## 2020-04-20 PROCEDURE — 96374 THER/PROPH/DIAG INJ IV PUSH: CPT

## 2020-04-20 PROCEDURE — 70450 CT HEAD/BRAIN W/O DYE: CPT

## 2020-04-20 PROCEDURE — 72040 X-RAY EXAM NECK SPINE 2-3 VW: CPT

## 2020-04-20 PROCEDURE — 700105 HCHG RX REV CODE 258: Performed by: EMERGENCY MEDICINE

## 2020-04-20 PROCEDURE — 94760 N-INVAS EAR/PLS OXIMETRY 1: CPT

## 2020-04-20 PROCEDURE — 80053 COMPREHEN METABOLIC PANEL: CPT

## 2020-04-20 PROCEDURE — 700111 HCHG RX REV CODE 636 W/ 250 OVERRIDE (IP): Performed by: EMERGENCY MEDICINE

## 2020-04-20 RX ORDER — MORPHINE SULFATE 4 MG/ML
4 INJECTION, SOLUTION INTRAMUSCULAR; INTRAVENOUS ONCE
Status: COMPLETED | OUTPATIENT
Start: 2020-04-20 | End: 2020-04-20

## 2020-04-20 RX ORDER — KETOROLAC TROMETHAMINE 30 MG/ML
15 INJECTION, SOLUTION INTRAMUSCULAR; INTRAVENOUS ONCE
Status: COMPLETED | OUTPATIENT
Start: 2020-04-20 | End: 2020-04-20

## 2020-04-20 RX ORDER — ONDANSETRON 2 MG/ML
4 INJECTION INTRAMUSCULAR; INTRAVENOUS ONCE
Status: COMPLETED | OUTPATIENT
Start: 2020-04-20 | End: 2020-04-20

## 2020-04-20 RX ORDER — ONDANSETRON 4 MG/1
4 TABLET, ORALLY DISINTEGRATING ORAL EVERY 8 HOURS PRN
Qty: 10 TAB | Refills: 0 | Status: SHIPPED | OUTPATIENT
Start: 2020-04-20 | End: 2023-04-19

## 2020-04-20 RX ORDER — SODIUM CHLORIDE 9 MG/ML
1000 INJECTION, SOLUTION INTRAVENOUS ONCE
Status: COMPLETED | OUTPATIENT
Start: 2020-04-20 | End: 2020-04-20

## 2020-04-20 RX ORDER — METOCLOPRAMIDE HYDROCHLORIDE 5 MG/ML
10 INJECTION INTRAMUSCULAR; INTRAVENOUS ONCE
Status: COMPLETED | OUTPATIENT
Start: 2020-04-20 | End: 2020-04-20

## 2020-04-20 RX ADMIN — MORPHINE SULFATE 4 MG: 4 INJECTION INTRAVENOUS at 20:00

## 2020-04-20 RX ADMIN — SODIUM CHLORIDE 1000 ML: 9 INJECTION, SOLUTION INTRAVENOUS at 19:58

## 2020-04-20 RX ADMIN — METOCLOPRAMIDE 10 MG: 5 INJECTION, SOLUTION INTRAMUSCULAR; INTRAVENOUS at 20:00

## 2020-04-20 RX ADMIN — ONDANSETRON HYDROCHLORIDE 4 MG: 2 INJECTION, SOLUTION INTRAMUSCULAR; INTRAVENOUS at 20:00

## 2020-04-20 RX ADMIN — ONDANSETRON HYDROCHLORIDE 4 MG: 2 INJECTION, SOLUTION INTRAMUSCULAR; INTRAVENOUS at 21:29

## 2020-04-20 RX ADMIN — KETOROLAC TROMETHAMINE 15 MG: 30 INJECTION, SOLUTION INTRAMUSCULAR at 21:29

## 2020-04-20 SDOH — HEALTH STABILITY: MENTAL HEALTH: HOW OFTEN DO YOU HAVE A DRINK CONTAINING ALCOHOL?: MONTHLY OR LESS

## 2020-04-20 SDOH — HEALTH STABILITY: MENTAL HEALTH: HOW OFTEN DO YOU HAVE 6 OR MORE DRINKS ON ONE OCCASION?: NEVER

## 2020-04-20 SDOH — HEALTH STABILITY: MENTAL HEALTH: HOW MANY STANDARD DRINKS CONTAINING ALCOHOL DO YOU HAVE ON A TYPICAL DAY?: 1 OR 2

## 2020-04-20 ASSESSMENT — FIBROSIS 4 INDEX: FIB4 SCORE: 0.66

## 2020-04-21 NOTE — ED NOTES
All lines and monitors discontinued. Discharge instructions given, questions answered.    ambulatory out of ER, escorted by self.  Instructed not to drive after taking pain medication and pt verbalizes understanding.  Rx x zofran given.

## 2020-04-21 NOTE — ED TRIAGE NOTES
Pt to ED with complaints of headache, pain in neck and shoulder, dizziness, nausea, vomiting, light sensitive, following a GLF about 7 days ago. She reports she slipped and fell at home home hitting the back of her head. No LOC. In last two days she has developed n/v - difficulty keeping food or water down. PERRL. FLOREZ.  equal. No n/t. She endorsees dizziness (room spinning) and left shoulder/neck pain that radiates into left head.   Hx of migraines, but this does not feel the same. Does NOT take any blood thinners.     She low risk for COVID19 based on screening questions. She is in a mask per hospital policy prior to triage.     Pt educated on ED process and asked to wait in lobby. Patient educated on importance of alerting staff to new or worsening symptoms or concerns.

## 2020-04-21 NOTE — ED PROVIDER NOTES
ED Provider Note    Scribed for Timoteo Gregory M.D. by Catherine Greenberg. 4/20/2020, 7:29 PM.    Primary care provider: Marleny Faustin M.D.  Means of arrival: Walk In  History obtained from: Patient  History limited by: None    CHIEF COMPLAINT  Chief Complaint   Patient presents with   • T-5000 GLF   • Dizziness   • N/V   • Head Injury       HPI  Abril Vance is a 29 y.o. female who presents to the Emergency Department for evaluation of dizziness, nausea, vomiting, and head injury secondary to a ground level fall that occurred 11 days ago. The patient states at the time she slipped and fell at her home. She denies loss of consciousness, but states she did hit the back of her head and sat on the floor for 5 minutes due to dizziness. For the past three days, she has had dizziness described as room spinning. Dizziness is exacerbated with closing her eyes. She also has some left neck, shoulder and elbow pain but pain is mostly in her head. She states headache developed today while getting ready for work and has been constant in duration. Patient notes history of migraines in the past. At presentation, she denies seeing any stars or lines with headache. She endorses photophobia, but denies any diplopia or any loss of vision. She reports vomiting and nausea after eating yesterday night, and has difficulty keeping down foods PO. No complaints of abdominal pain. Denies any pain in extremities. She is sexually active, and reports history of hysterectomy. She is also s/p gastric bypass. Denies taking any daily medications. She is not allergic to any medications. The patient states she takes Tylenol as needed after gastric bypass, but has not taking anything for current symptoms.     REVIEW OF SYSTEMS  Pertinent positives include dizziness, nausea, vomiting, and head injury, headache, photophobia, nausea, vomiting, left neck, shoulder and elbow pain Pertinent negatives include diplopia, loss of vision, extremities  "pain, loss of consciousness  All other systems negative.    PAST MEDICAL HISTORY   has a past medical history of Arthritis, Gallstones, History of anemia, Migraines (4/5/2010), Morbid obesity (HCC) (4/5/2010), Sleep apnea, Snoring, and Vaginal trichomoniasis (4/5/2010).    SURGICAL HISTORY   has a past surgical history that includes lauren by laparoscopy (10/14/2010); other (2016); vaginal hysterectomy scope total (9/25/2018); salpingectomy (Bilateral, 9/25/2018); anterior and posterior repair (9/25/2018); enterocele repair (9/25/2018); bladder sling female (9/25/2018); and vaginal suspension (9/25/2018).    SOCIAL HISTORY  Social History     Tobacco Use   • Smoking status: Never Smoker   • Smokeless tobacco: Never Used   Substance Use Topics   • Alcohol use: Yes     Frequency: Monthly or less     Drinks per session: 1 or 2     Binge frequency: Never   • Drug use: No      Social History     Substance and Sexual Activity   Drug Use No       FAMILY HISTORY  Family History   Family history unknown: Yes       CURRENT MEDICATIONS  Home Medications     Reviewed by Alicia Austin R.N. (Registered Nurse) on 04/20/20 at 1836  Med List Status: Complete   Medication Last Dose Status   acetaminophen (TYLENOL) 325 MG Tab  Active   IRON PO stopped x1 weeks Active   phentermine (ADIPEX-P) 37.5 MG tablet not taking x1 weeks Active                ALLERGIES  Allergies   Allergen Reactions   • Nkda [No Known Drug Allergy]        PHYSICAL EXAM  VITAL SIGNS: /88   Pulse 73   Temp 36.3 °C (97.3 °F) (Temporal)   Resp 16   Ht 1.6 m (5' 3\")   Wt 103.1 kg (227 lb 4.7 oz)   LMP 09/20/2018 (Approximate)   SpO2 98%   BMI 40.26 kg/m²     Constitutional: Well developed, Well nourished, Mild distress.    HENT: Hematoma over the right parietal region. Normocephalic, Oropharynx moist.   Eyes: EOMI, pupils 3 mm, no nystagmus. Conjunctiva normal, No discharge.   Neck: Supple, No stridor, Midline vertebral point tenderness about C4, 5 " and 6  Cardiovascular: Normal heart rate, Normal rhythm, No murmurs, equal pulses.   Pulmonary: Normal breath sounds, No respiratory distress, No wheezing, No rales, No rhonchi.  Abdomen:Soft, No tenderness, No masses, no rebound, no guarding.    Musculoskeletal: No major deformities noted, No tenderness.   Skin: Warm, Dry, No erythema, No rash.   Neurologic: Normal sensation of face, no facial droop, Negative romberg test, normal gait, Alert & oriented x 3, Normal motor function,  No focal deficits noted. Normal finger to nose, Normal cranial nerves II-XII, No pronator drift. Equal strength in upper and lower extremities bilaterally.   Psychiatric: Affect normal, Judgment normal, Mood normal.       LABS  Results for orders placed or performed during the hospital encounter of 04/20/20   CBC WITH DIFFERENTIAL   Result Value Ref Range    WBC 7.5 4.8 - 10.8 K/uL    RBC 4.71 4.20 - 5.40 M/uL    Hemoglobin 16.3 (H) 12.0 - 16.0 g/dL    Hematocrit 49.1 (H) 37.0 - 47.0 %    .2 (H) 81.4 - 97.8 fL    MCH 34.6 (H) 27.0 - 33.0 pg    MCHC 33.2 (L) 33.6 - 35.0 g/dL    RDW 52.6 (H) 35.9 - 50.0 fL    Platelet Count 258 164 - 446 K/uL    MPV 9.9 9.0 - 12.9 fL    Neutrophils-Polys 67.40 44.00 - 72.00 %    Lymphocytes 26.00 22.00 - 41.00 %    Monocytes 4.70 0.00 - 13.40 %    Eosinophils 0.80 0.00 - 6.90 %    Basophils 0.80 0.00 - 1.80 %    Immature Granulocytes 0.30 0.00 - 0.90 %    Nucleated RBC 0.00 /100 WBC    Neutrophils (Absolute) 5.07 2.00 - 7.15 K/uL    Lymphs (Absolute) 1.95 1.00 - 4.80 K/uL    Monos (Absolute) 0.35 0.00 - 0.85 K/uL    Eos (Absolute) 0.06 0.00 - 0.51 K/uL    Baso (Absolute) 0.06 0.00 - 0.12 K/uL    Immature Granulocytes (abs) 0.02 0.00 - 0.11 K/uL    NRBC (Absolute) 0.00 K/uL   COMP METABOLIC PANEL   Result Value Ref Range    Sodium 139 135 - 145 mmol/L    Potassium 3.8 3.6 - 5.5 mmol/L    Chloride 98 96 - 112 mmol/L    Co2 22 20 - 33 mmol/L    Anion Gap 19.0 (H) 7.0 - 16.0    Glucose 86 65 - 99 mg/dL     Bun 9 8 - 22 mg/dL    Creatinine 0.67 0.50 - 1.40 mg/dL    Calcium 10.3 8.5 - 10.5 mg/dL    AST(SGOT) 35 12 - 45 U/L    ALT(SGPT) 28 2 - 50 U/L    Alkaline Phosphatase 200 (H) 30 - 99 U/L    Total Bilirubin 0.8 0.1 - 1.5 mg/dL    Albumin 5.8 (H) 3.2 - 4.9 g/dL    Total Protein 10.2 (H) 6.0 - 8.2 g/dL    Globulin 4.4 (H) 1.9 - 3.5 g/dL    A-G Ratio 1.3 g/dL   APTT   Result Value Ref Range    APTT 32.5 24.7 - 36.0 sec   PROTHROMBIN TIME (INR)   Result Value Ref Range    PT 12.8 12.0 - 14.6 sec    INR 0.94 0.87 - 1.13   ESTIMATED GFR   Result Value Ref Range    GFR If African American >60 >60 mL/min/1.73 m 2    GFR If Non African American >60 >60 mL/min/1.73 m 2      All labs reviewed by me.      RADIOLOGY  DX-CERVICAL SPINE-2 OR 3 VIEWS   Final Result      No cervical spine fracture identified within limitations of conventional radiography      CT-HEAD W/O   Final Result      Negative noncontrast CT scan of the head / brain.        The radiologist's interpretation of all radiological studies have been reviewed by me.    COURSE & MEDICAL DECISION MAKING  Pertinent Labs & Imaging studies reviewed. (See chart for details)    7:29 PM - Patient seen and examined at bedside. Discussed plan of care with patient. I informed them that labs and imaging will be ordered to evaluate symptoms. Patient is understanding and agreeable with plan.  Patient will be treated with Reglan 10 mg, Zofran 4 mg, morphine 4 mg. The patient will receive IV fluids for nausea and vomiting. Ordered CT head, APTT, PTT (INR), CBC with diff, CMP to evaluate her symptoms. The differential diagnoses include but are not limited to: post concussion syndrome, skull fracture, intracranial hemorrhage.      There was a improved response to IV fluids after patient reevaluation.    9:20 PM Patient was reevaluated at bedside. She reports she still has a headache and nausea, therefore she will be treated with Zofran 4 mg and Toradol 15 mg. Discussed lab and  radiology results with the patient and informed them that head CT was negative for bleed. My suspicion for skull fracture is low. Will order DX cervical spine to further evaluate.       HYDRATION: Based on the patient's presentation of Inability to take oral fluids and Other nausea and vomiting the patient was given IV fluids. IV Hydration was used because oral hydration was not adequate alone. Upon recheck following hydration, the patient was improved.       Medical Decision Making: At this point time I think the patient's headache dizziness and nausea is secondary to a postconcussive syndrome.  Patient had a CT of the head done which is negative for intracranial hemorrhage or fracture.  X-ray of the neck was done because of vertebral point tenderness this is also negative.  At this point time patient is feeling much better her headache is gone away with medications.  She may have had some underlying chronic migraine headache and her headache is gone away with a migraine cocktail.  She has no other neurologic deficits her labs are otherwise unremarkable at this point time will discharge her home to follow-up with her doctor.  Discussed avoiding activities where she may strike her head again.     The patient will return for new or worsening symptoms and is stable at the time of discharge.    The patient is referred to a primary physician for blood pressure management, diabetic screening, and for all other preventative health concerns.        DISPOSITION:  Patient will be discharged home in stable condition.    FOLLOW UP:  Marleny Faustin M.D.  [address] [facility] [address]  [phone]    Schedule an appointment as soon as possible for a visit in 1 week        OUTPATIENT MEDICATIONS:  Discharge Medication List as of 4/20/2020 11:18 PM      START taking these medications    Details   ondansetron (ZOFRAN ODT) 4 MG TABLET DISPERSIBLE Take 1 Tab by mouth every 8 hours as needed., Disp-10 Tab, R-0, Print Rx  Paper                FINAL IMPRESSION  1. Post concussion syndrome    2. Non-intractable vomiting with nausea, unspecified vomiting type          I, Catherine Greenberg (Scribe), am scribing for, and in the presence of, Timoteo Gregory M.D.    Electronically signed by: Catherine Greenberg (Scribe), 4/20/2020    ITimoteo M.D. personally performed the services described in this documentation, as scribed by Catherine Greenberg in my presence, and it is both accurate and complete. C    The note accurately reflects work and decisions made by me.  Timoteo Gregory M.D.  4/21/2020  12:44 AM

## 2020-09-23 ENCOUNTER — APPOINTMENT (OUTPATIENT)
Dept: RADIOLOGY | Facility: MEDICAL CENTER | Age: 30
End: 2020-09-23
Attending: EMERGENCY MEDICINE
Payer: MEDICAID

## 2020-09-23 ENCOUNTER — HOSPITAL ENCOUNTER (EMERGENCY)
Facility: MEDICAL CENTER | Age: 30
End: 2020-09-23
Attending: EMERGENCY MEDICINE
Payer: MEDICAID

## 2020-09-23 VITALS
BODY MASS INDEX: 40.85 KG/M2 | HEART RATE: 66 BPM | TEMPERATURE: 97 F | OXYGEN SATURATION: 93 % | DIASTOLIC BLOOD PRESSURE: 65 MMHG | WEIGHT: 230.6 LBS | SYSTOLIC BLOOD PRESSURE: 105 MMHG | RESPIRATION RATE: 18 BRPM

## 2020-09-23 DIAGNOSIS — S09.90XA CLOSED HEAD INJURY, INITIAL ENCOUNTER: ICD-10-CM

## 2020-09-23 PROCEDURE — 99284 EMERGENCY DEPT VISIT MOD MDM: CPT

## 2020-09-23 PROCEDURE — 70450 CT HEAD/BRAIN W/O DYE: CPT

## 2020-09-23 ASSESSMENT — FIBROSIS 4 INDEX: FIB4 SCORE: 0.82

## 2020-09-23 NOTE — ED PROVIDER NOTES
ED Provider Note    CHIEF COMPLAINT  Chief Complaint   Patient presents with   • T-5000 GLF       HPI  Abril Vnace is a 30 y.o. female who presents she fell on Saturday getting out of the shower.  She states she has a broken countertop she put her weight on it when she was trying to step out and slipped landing hitting her head on the left side top of her head as well as the left side of her nose.  She states she has had progressive headache since then.  This is not been associated with any vomiting.  She became concerned enough to come to the emergency department today when she started experiencing numbness of her forehead and the right side of her face.  She also states for the last 3 nights she has not been able to sleep she tries to fall asleep and then she feels like she cannot breathe and a panic sensation comes over her.  Her feet become very hot.  She denies any difficulty ambulating focal weakness numbness or neck pain    REVIEW OF SYSTEMS  Positive for facial pain, headache, facial numbness, difficulty sleeping negative for vomiting confusion all other systems are negative  PAST MEDICAL HISTORY   has a past medical history of Arthritis, Gallstones, History of anemia, Migraines (4/5/2010), Morbid obesity (HCC) (4/5/2010), Sleep apnea, Snoring, and Vaginal trichomoniasis (4/5/2010).    SOCIAL HISTORY  Social History     Tobacco Use   • Smoking status: Never Smoker   • Smokeless tobacco: Never Used   Substance and Sexual Activity   • Alcohol use: Yes     Frequency: Monthly or less     Drinks per session: 1 or 2     Binge frequency: Never   • Drug use: No   • Sexual activity: Not on file       SURGICAL HISTORY   has a past surgical history that includes lauren by laparoscopy (10/14/2010); other (2016); vaginal hysterectomy scope total (9/25/2018); salpingectomy (Bilateral, 9/25/2018); anterior and posterior repair (9/25/2018); enterocele repair (9/25/2018); bladder sling female (9/25/2018); and vaginal  suspension (9/25/2018).    CURRENT MEDICATIONS  Reviewed.  See Encounter Summary.      ALLERGIES  Allergies   Allergen Reactions   • Nkda [No Known Drug Allergy]        PHYSICAL EXAM  VITAL SIGNS: /77   Pulse 85   Temp 36.1 °C (97 °F) (Temporal)   Resp 18   Wt 104.6 kg (230 lb 9.6 oz)   LMP 09/20/2018 (Approximate)   SpO2 93%   BMI 40.85 kg/m²   Constitutional: , Alert in no apparent distress.  HENT: Patient has a cephalhematoma at the vertex of her head as well as bruising noted on her left eyebrow and medial on the left side of her nose, she no has no septal hematoma, her septum appears straight.   Bilateral external ears normal. Nose normal.   Eyes: Pupils are equal and reactive. Conjunctiva normal, non-icteric.   Thorax & Lungs: Easy unlabored respirations  Abdomen:  No gross signs of peritonitis, no pain with movement   Skin: Visualized skin is  Dry, No erythema, No rash.   Extremities:   No edema, No asymmetry  Neurologic: Alert, Grossly non-focal.   Psychiatric: Affect and Mood normal      COURSE & MEDICAL DECISION MAKING  Nursing notes and vital signs were reviewed. (See chart for details)    The patient presents to the Emergency Department with mechanical fall from slipping this was several days ago but she is had progressive headaches now neurologic symptoms and difficulty sleeping.  At this time she warrants imaging and CT has been ordered.  She realizes the imaging does create a radiation risk but feels that with her symptoms she wants to make sure that nothing more than a concussion has occurred.  She clinically does not appear to have a nasal fracture    CT-HEAD W/O   Final Result      No CT evidence of acute infarct, hemorrhage or mass.          The patient was discharged home with an information sheet on concusion and told to return immediately for any signs or symptoms listed, but specifically if vomiting confustion, or any worsening at all.  The patient verbally agreed to the discharge  precautions and follow-up plan which is documented in EPIC.    FINAL IMPRESSION  1. CHI    Electronically signed by: Pauline Sosa M.D., 9/23/2020 1:38 PM

## 2020-09-23 NOTE — ED NOTES
Pt to CT scan via [facility].  Pt reports hysterectomy.  Urine pregnancy cancelled.

## 2020-09-23 NOTE — ED TRIAGE NOTES
"Chief Complaint   Patient presents with   • T-5000 GLF       Patient ambulatory to triage with above complaint. States she had a MGLF on Sunday, she fell in shower. States she hit her nose and head. Small bump on the front of head with slight bruising  to left side of nose. Patient states she had diarrhea on Sunday but it has since resolved. States she has \"numbinf feeling on left side of head\".  A+ox4.     Blood Pressure: 118/77, Pulse: 85, Respiration: 18, Temperature: 36.1 °C (97 °F), Weight: 104.6 kg (230 lb 9.6 oz), Pulse Oximetry: 93 %      "

## 2020-09-23 NOTE — ED NOTES
Break rn note: Pt discharge home. Pt given discharge instructions . Pt verbalized understanding, all questions answered ,vss upon d/c. Pt steady on feet upon discharge

## 2021-06-11 ENCOUNTER — APPOINTMENT (OUTPATIENT)
Dept: RADIOLOGY | Facility: MEDICAL CENTER | Age: 31
End: 2021-06-11
Attending: EMERGENCY MEDICINE
Payer: MEDICAID

## 2021-06-11 ENCOUNTER — HOSPITAL ENCOUNTER (EMERGENCY)
Facility: MEDICAL CENTER | Age: 31
End: 2021-06-11
Attending: EMERGENCY MEDICINE
Payer: MEDICAID

## 2021-06-11 VITALS
TEMPERATURE: 97.9 F | HEART RATE: 54 BPM | HEIGHT: 63 IN | RESPIRATION RATE: 20 BRPM | DIASTOLIC BLOOD PRESSURE: 56 MMHG | WEIGHT: 229.28 LBS | OXYGEN SATURATION: 94 % | BODY MASS INDEX: 40.62 KG/M2 | SYSTOLIC BLOOD PRESSURE: 104 MMHG

## 2021-06-11 DIAGNOSIS — G43.109 COMPLICATED MIGRAINE: ICD-10-CM

## 2021-06-11 DIAGNOSIS — H92.02 LEFT EAR PAIN: ICD-10-CM

## 2021-06-11 DIAGNOSIS — M26.622 ARTHRALGIA OF LEFT TEMPOROMANDIBULAR JOINT: ICD-10-CM

## 2021-06-11 LAB
ANION GAP SERPL CALC-SCNC: 9 MMOL/L (ref 7–16)
BASOPHILS # BLD AUTO: 1.4 % (ref 0–1.8)
BASOPHILS # BLD: 0.08 K/UL (ref 0–0.12)
BUN SERPL-MCNC: 8 MG/DL (ref 8–22)
CALCIUM SERPL-MCNC: 8.7 MG/DL (ref 8.5–10.5)
CHLORIDE SERPL-SCNC: 108 MMOL/L (ref 96–112)
CO2 SERPL-SCNC: 23 MMOL/L (ref 20–33)
CREAT SERPL-MCNC: 0.68 MG/DL (ref 0.5–1.4)
EOSINOPHIL # BLD AUTO: 0.12 K/UL (ref 0–0.51)
EOSINOPHIL NFR BLD: 2.1 % (ref 0–6.9)
ERYTHROCYTE [DISTWIDTH] IN BLOOD BY AUTOMATED COUNT: 50.3 FL (ref 35.9–50)
GLUCOSE SERPL-MCNC: 89 MG/DL (ref 65–99)
HCG SERPL QL: NEGATIVE
HCT VFR BLD AUTO: 39.5 % (ref 37–47)
HGB BLD-MCNC: 12.9 G/DL (ref 12–16)
IMM GRANULOCYTES # BLD AUTO: 0.01 K/UL (ref 0–0.11)
IMM GRANULOCYTES NFR BLD AUTO: 0.2 % (ref 0–0.9)
LYMPHOCYTES # BLD AUTO: 2 K/UL (ref 1–4.8)
LYMPHOCYTES NFR BLD: 35 % (ref 22–41)
MCH RBC QN AUTO: 33.2 PG (ref 27–33)
MCHC RBC AUTO-ENTMCNC: 32.7 G/DL (ref 33.6–35)
MCV RBC AUTO: 101.8 FL (ref 81.4–97.8)
MONOCYTES # BLD AUTO: 0.41 K/UL (ref 0–0.85)
MONOCYTES NFR BLD AUTO: 7.2 % (ref 0–13.4)
NEUTROPHILS # BLD AUTO: 3.09 K/UL (ref 2–7.15)
NEUTROPHILS NFR BLD: 54.1 % (ref 44–72)
NRBC # BLD AUTO: 0 K/UL
NRBC BLD-RTO: 0 /100 WBC
PLATELET # BLD AUTO: 262 K/UL (ref 164–446)
PMV BLD AUTO: 9.9 FL (ref 9–12.9)
POTASSIUM SERPL-SCNC: 4 MMOL/L (ref 3.6–5.5)
RBC # BLD AUTO: 3.88 M/UL (ref 4.2–5.4)
SODIUM SERPL-SCNC: 140 MMOL/L (ref 135–145)
WBC # BLD AUTO: 5.7 K/UL (ref 4.8–10.8)

## 2021-06-11 PROCEDURE — 70498 CT ANGIOGRAPHY NECK: CPT

## 2021-06-11 PROCEDURE — 70496 CT ANGIOGRAPHY HEAD: CPT

## 2021-06-11 PROCEDURE — 85025 COMPLETE CBC W/AUTO DIFF WBC: CPT

## 2021-06-11 PROCEDURE — 80048 BASIC METABOLIC PNL TOTAL CA: CPT

## 2021-06-11 PROCEDURE — 96375 TX/PRO/DX INJ NEW DRUG ADDON: CPT | Mod: XU

## 2021-06-11 PROCEDURE — 99284 EMERGENCY DEPT VISIT MOD MDM: CPT

## 2021-06-11 PROCEDURE — 84703 CHORIONIC GONADOTROPIN ASSAY: CPT

## 2021-06-11 PROCEDURE — 96374 THER/PROPH/DIAG INJ IV PUSH: CPT | Mod: XU

## 2021-06-11 PROCEDURE — 700111 HCHG RX REV CODE 636 W/ 250 OVERRIDE (IP): Performed by: EMERGENCY MEDICINE

## 2021-06-11 PROCEDURE — 700117 HCHG RX CONTRAST REV CODE 255: Performed by: EMERGENCY MEDICINE

## 2021-06-11 PROCEDURE — 36415 COLL VENOUS BLD VENIPUNCTURE: CPT

## 2021-06-11 PROCEDURE — A9270 NON-COVERED ITEM OR SERVICE: HCPCS | Performed by: EMERGENCY MEDICINE

## 2021-06-11 PROCEDURE — 700105 HCHG RX REV CODE 258: Performed by: EMERGENCY MEDICINE

## 2021-06-11 PROCEDURE — 700102 HCHG RX REV CODE 250 W/ 637 OVERRIDE(OP): Performed by: EMERGENCY MEDICINE

## 2021-06-11 RX ORDER — DEXAMETHASONE SODIUM PHOSPHATE 4 MG/ML
10 INJECTION, SOLUTION INTRA-ARTICULAR; INTRALESIONAL; INTRAMUSCULAR; INTRAVENOUS; SOFT TISSUE ONCE
Status: COMPLETED | OUTPATIENT
Start: 2021-06-11 | End: 2021-06-11

## 2021-06-11 RX ORDER — DIPHENHYDRAMINE HYDROCHLORIDE 50 MG/ML
12.5 INJECTION INTRAMUSCULAR; INTRAVENOUS ONCE
Status: COMPLETED | OUTPATIENT
Start: 2021-06-11 | End: 2021-06-11

## 2021-06-11 RX ORDER — METOCLOPRAMIDE HYDROCHLORIDE 5 MG/ML
10 INJECTION INTRAMUSCULAR; INTRAVENOUS ONCE
Status: COMPLETED | OUTPATIENT
Start: 2021-06-11 | End: 2021-06-11

## 2021-06-11 RX ORDER — SODIUM CHLORIDE 9 MG/ML
1000 INJECTION, SOLUTION INTRAVENOUS ONCE
Status: COMPLETED | OUTPATIENT
Start: 2021-06-11 | End: 2021-06-11

## 2021-06-11 RX ORDER — MORPHINE SULFATE 4 MG/ML
4 INJECTION, SOLUTION INTRAMUSCULAR; INTRAVENOUS ONCE
Status: COMPLETED | OUTPATIENT
Start: 2021-06-11 | End: 2021-06-11

## 2021-06-11 RX ORDER — ACETAMINOPHEN 325 MG/1
650 TABLET ORAL ONCE
Status: COMPLETED | OUTPATIENT
Start: 2021-06-11 | End: 2021-06-11

## 2021-06-11 RX ORDER — PROMETHAZINE HYDROCHLORIDE 25 MG/1
25 TABLET ORAL EVERY 8 HOURS PRN
Qty: 6 TABLET | Refills: 0 | Status: SHIPPED | OUTPATIENT
Start: 2021-06-11 | End: 2021-06-13

## 2021-06-11 RX ORDER — KETOROLAC TROMETHAMINE 30 MG/ML
15 INJECTION, SOLUTION INTRAMUSCULAR; INTRAVENOUS ONCE
Status: COMPLETED | OUTPATIENT
Start: 2021-06-11 | End: 2021-06-11

## 2021-06-11 RX ADMIN — DEXAMETHASONE SODIUM PHOSPHATE 10 MG: 4 INJECTION, SOLUTION INTRA-ARTICULAR; INTRALESIONAL; INTRAMUSCULAR; INTRAVENOUS; SOFT TISSUE at 16:32

## 2021-06-11 RX ADMIN — DIPHENHYDRAMINE HYDROCHLORIDE 12.5 MG: 50 INJECTION INTRAMUSCULAR; INTRAVENOUS at 16:36

## 2021-06-11 RX ADMIN — IOHEXOL 60 ML: 350 INJECTION, SOLUTION INTRAVENOUS at 18:33

## 2021-06-11 RX ADMIN — KETOROLAC TROMETHAMINE 15 MG: 30 INJECTION, SOLUTION INTRAMUSCULAR; INTRAVENOUS at 16:32

## 2021-06-11 RX ADMIN — MORPHINE SULFATE 4 MG: 4 INJECTION INTRAVENOUS at 21:28

## 2021-06-11 RX ADMIN — METOCLOPRAMIDE 10 MG: 5 INJECTION, SOLUTION INTRAMUSCULAR; INTRAVENOUS at 16:32

## 2021-06-11 RX ADMIN — SODIUM CHLORIDE 1000 ML: 9 INJECTION, SOLUTION INTRAVENOUS at 16:32

## 2021-06-11 RX ADMIN — ACETAMINOPHEN 650 MG: 325 TABLET, FILM COATED ORAL at 21:00

## 2021-06-11 ASSESSMENT — FIBROSIS 4 INDEX: FIB4 SCORE: 0.82

## 2021-06-11 ASSESSMENT — LIFESTYLE VARIABLES: DO YOU DRINK ALCOHOL: NO

## 2021-06-11 NOTE — ED TRIAGE NOTES
"Pt to triage, c/o lt sided ear pain/ migraine/ N/T to lt side of face for 3 days, hx of migraines. States \"it all came on at the same time 3 days ago\"   "

## 2021-06-11 NOTE — ED PROVIDER NOTES
"ED Provider Note    Scribed for Ayana Cabrera M.D. by Jacinta Jay. 6/11/2021  3:23 PM    Primary care provider: Marleny Faustin M.D.  Means of arrival: Walk-in  History obtained from: Patient  History limited by: None    CHIEF COMPLAINT  Chief Complaint   Patient presents with   • Ear Pain   • Migraine   • Numbness     x 3 days      HPI  Abril Vance is a 30 y.o. female with a history of migraines who presents to the ED for evaluation of worsening left sided migraine, left ear pain, left face pain and left face numbness/tingling onset 3 days ago. The patient states all three symptoms started simultaneously. The patient describes the left-sided facial pain and migraine as sharp with associated tingling in the left face. She states her left ear \"felt like it was going to pop.\"  But she describes the ear pain is intermittent and sometimes sharp and stabbing.  She notes that she was crying in the lobby and rates her pain 11/10 in severity. Symptoms are exacerbated with movement. The patient denies associated facial pain or ear pain with migraines in the past. However, she reports some tingling to the right side of her face in the past with her migraines when they occur on the right side. The patient endorses associated sore throat that occurs with swallowing. Denies facial droop, dental pain, tongue swelling, congestion, rhinorrhea, thick nasal discharge, fever, chills, numbness/tingling or focal weakness to extremities.  No visual changes.  No vertigo.  No neck pain.  No trauma or injury to the head.  The patient reports having 5 migraines a week for years. She states the migraines switch  from right to left with each episode. Her primary care physician is [facility]. She has not seen a neurologist for migraine management in the past. The patient has been prescribed Imitrex but states that it does not work for her.  She tried physical therapy because her doctor thought her migraines " were secondary to neck problems, but that did not help.  She has been taking Tylenol for the current episode of symptoms without alleviation. The patient has been evaluated in the ED for migraines in the past. Patient denies any recent falls. Denies a history of TMJ or grinding her teeth at night. Denies drinking large amounts of coffee. Denies change of pregnancy. She denies taking any daily medications or having any medication allergies.     REVIEW OF SYSTEMS  Pertinent positives include migraine, left ear pain, left face pain, left arm tingling, and sore throat. Pertinent negatives include no facial droop, dental pain, tongue swelling, congestion, rhinorrhea, numbness, or focal weakness. See HPI for further details. All other systems are negative.    PAST MEDICAL HISTORY  Past Medical History:   Diagnosis Date   • Arthritis     RA- hands, knees   • Gallstones     2008   • History of anemia    • Migraines 4/5/2010   • Morbid obesity (HCC) 4/5/2010   • Sleep apnea     does not use cpap, pt reports that she was told it is a mild case   • Snoring     sleep study done   • Vaginal trichomoniasis 4/5/2010     FAMILY HISTORY  Family History   Family history unknown: Yes     SOCIAL HISTORY  Social History     Socioeconomic History   • Marital status: Legally      Spouse name: None noted   • Number of children: None noted   • Years of education: None noted   • Highest education level: None noted   Occupational History   • Not on file   Tobacco Use   • Smoking status: Never Smoker   • Smokeless tobacco: Never Used   Vaping Use   • Vaping Use: Never used   Substance and Sexual Activity   • Alcohol use: Yes   • Drug use: No   • Sexual activity: None noted   Other Topics Concern   • None noted   Social History Narrative    ** Merged History Encounter **          Social Determinants of Health     Financial Resource Strain:    • Difficulty of Paying Living Expenses:    Food Insecurity:    • Worried About Running Out of  Food in the Last Year:    • Ran Out of Food in the Last Year:    Transportation Needs:    • Lack of Transportation (Medical):    • Lack of Transportation (Non-Medical):    Physical Activity:    • Days of Exercise per Week:    • Minutes of Exercise per Session:    Stress:    • Feeling of Stress :    Social Connections:    • Frequency of Communication with Friends and Family:    • Frequency of Social Gatherings with Friends and Family:    • Attends [employer] Services:    • Active Member of Clubs or Organizations:    • Attends Club or Organization Meetings:    • Marital Status:    Intimate Partner Violence:    • Fear of Current or Ex-Partner:    • Emotionally Abused:    • Physically Abused:    • Sexually Abused:      SURGICAL HISTORY  Past Surgical History:   Procedure Laterality Date   • VAGINAL HYSTERECTOMY SCOPE TOTAL  9/25/2018    Procedure: VAGINAL HYSTERECTOMY SCOPE TOTAL;  Surgeon: Abram Dupree M.D.;  Location: SURGERY SAME DAY [facility];  Service: Gynecology   • SALPINGECTOMY Bilateral 9/25/2018    Procedure: SALPINGECTOMY;  Surgeon: Abram Dupree M.D.;  Location: SURGERY SAME DAY [facility];  Service: Gynecology   • ANTERIOR AND POSTERIOR REPAIR  9/25/2018    Procedure: ANTERIOR AND POSTERIOR REPAIR;  Surgeon: Abram Dupree M.D.;  Location: SURGERY SAME DAY [facility];  Service: Gynecology   • ENTEROCELE REPAIR  9/25/2018    Procedure: ENTEROCELE REPAIR- PERINEOPLASTY;  Surgeon: Abram Dupree M.D.;  Location: SURGERY SAME DAY [facility];  Service: Gynecology   • BLADDER SLING FEMALE  9/25/2018    Procedure: BLADDER SLING FEMALE- TOT;  Surgeon: Abram Dupree M.D.;  Location: SURGERY SAME DAY [facility];  Service: Gynecology   • VAGINAL SUSPENSION  9/25/2018    Procedure: VAGINAL SUSPENSION- POSS SACROSPINOUS VAULT;  Surgeon: Abram Dupree M.D.;  Location: SURGERY SAME DAY [facility];  Service: Gynecology   • OTHER  2016    gastric sleeve   • PATEL BY LAPAROSCOPY  10/14/2010     "Performed by YAIR ENNIS at SURGERY [facility] ORS     CURRENT MEDICATIONS  Home Medications    **Home medications have not yet been reviewed for this encounter**       ALLERGIES  Allergies   Allergen Reactions   • Nkda [No Known Drug Allergy]      PHYSICAL EXAM  VITAL SIGNS: /71   Pulse 60   Temp 36.6 °C (97.9 °F) (Temporal)   Resp 16   Ht 1.6 m (5' 3\")   Wt 104 kg (229 lb 4.5 oz)   LMP 09/20/2018 (Approximate)   SpO2 100%   BMI 40.61 kg/m²      Constitutional: Well developed, well nourished; moderate acute distress; Non-toxic appearance.   HENT: Normocephalic, atraumatic; Bilateral external ears normal;  tympanic membranes clear.  Slight pain with movement of the left pinna.  Poor dentition throughout but no tenderness to percussion of the teeth. Tender over the left TMJ with opening and closing of the mouth. No facial swelling. Tenderness to palpation over the left frontal sinus, left maxillar sinus, nose, and along left chin and jawline, no swelling. No trismus.  No rash.  No vesicles.  No mastoid tenderness.  Eyes: PERRL, EOMI, Conjunctiva normal. No discharge.   Neck: Supple, nontender midline; No stridor; No nuchal rigidity.   Lymphatic: Mild tenderness with palpation of the anterior cervical lymph nodes.   Cardiovascular: Regular rate and rhythm without murmurs, rubs, or gallop.   Thorax & Lungs: No respiratory distress, breath sounds clear to auscultation bilaterally without wheezing, rales or rhonchi. Nontender chest wall. No crepitus or subcutaneous air  Abdomen: Soft, nontender, bowel sounds normal. No obvious masses; No pulsatile masses; no rebound, guarding, or peritoneal signs.   Skin: Good color; warm and dry without rash or petechia.  Back: Nontender, No CVA tenderness.   Extremities: Distal radial, dorsalis pedis, posterior tibial pulses are equal bilaterally; No edema; Nontender calves or saphenous, No cyanosis, No clubbing.   Musculoskeletal: Good range of motion in all major " joints. No tenderness to palpation or major deformities noted.   Neurologic: Alert & oriented x 4, clear speech, cranial nerves II through XII intact without facial asymmetry, strengths 5 out of 5 equal bilateral upper and lower extremities, sensory grossly intact, no drift, no ataxia with finger to nose or heel to shin testing.     LABS/RADIOLOGY/PROCEDURES  Results for orders placed or performed during the hospital encounter of 06/11/21   CBC WITH DIFFERENTIAL   Result Value Ref Range    WBC 5.7 4.8 - 10.8 K/uL    RBC 3.88 (L) 4.20 - 5.40 M/uL    Hemoglobin 12.9 12.0 - 16.0 g/dL    Hematocrit 39.5 37.0 - 47.0 %    .8 (H) 81.4 - 97.8 fL    MCH 33.2 (H) 27.0 - 33.0 pg    MCHC 32.7 (L) 33.6 - 35.0 g/dL    RDW 50.3 (H) 35.9 - 50.0 fL    Platelet Count 262 164 - 446 K/uL    MPV 9.9 9.0 - 12.9 fL    Neutrophils-Polys 54.10 44.00 - 72.00 %    Lymphocytes 35.00 22.00 - 41.00 %    Monocytes 7.20 0.00 - 13.40 %    Eosinophils 2.10 0.00 - 6.90 %    Basophils 1.40 0.00 - 1.80 %    Immature Granulocytes 0.20 0.00 - 0.90 %    Nucleated RBC 0.00 /100 WBC    Neutrophils (Absolute) 3.09 2.00 - 7.15 K/uL    Lymphs (Absolute) 2.00 1.00 - 4.80 K/uL    Monos (Absolute) 0.41 0.00 - 0.85 K/uL    Eos (Absolute) 0.12 0.00 - 0.51 K/uL    Baso (Absolute) 0.08 0.00 - 0.12 K/uL    Immature Granulocytes (abs) 0.01 0.00 - 0.11 K/uL    NRBC (Absolute) 0.00 K/uL   BASIC METABOLIC PANEL   Result Value Ref Range    Sodium 140 135 - 145 mmol/L    Potassium 4.0 3.6 - 5.5 mmol/L    Chloride 108 96 - 112 mmol/L    Co2 23 20 - 33 mmol/L    Glucose 89 65 - 99 mg/dL    Bun 8 8 - 22 mg/dL    Creatinine 0.68 0.50 - 1.40 mg/dL    Calcium 8.7 8.5 - 10.5 mg/dL    Anion Gap 9.0 7.0 - 16.0   HCG QUAL SERUM   Result Value Ref Range    Beta-Hcg Qualitative Serum Negative Negative   ESTIMATED GFR   Result Value Ref Range    GFR If African American >60 >60 mL/min/1.73 m 2    GFR If Non African American >60 >60 mL/min/1.73 m 2     CT-CTA HEAD WITH & W/O-POST  PROCESS   Final Result         1. No vascular malformation, aneurysm or occlusion seen in the major intracranial vessels.      CT-CTA NECK WITH & W/O-POST PROCESSING   Final Result      CT angiogram of the neck within normal limits.        COURSE & MEDICAL DECISION MAKING  Pertinent Labs & Imaging studies reviewed. (See chart for details)        3:23 PM - Patient seen and examined at bedside. Discussed plan of care, including medication. Patient agrees to the plan of care. The patient will be resuscitated with 1L NS IV and medicated with Benadryl 12.5 mg injection, Reglan 10 mg injection, Toradol 15 mg injection, and Decadron 10 mg injection. Ordered for labs to evaluate her symptoms.    4:47 PM - Patient reassessed at bedside. She reports her pain is unchanged.      6:32 PM - Patient will be treated with Iohexol 350 mg/mL.     8:19 PM - Patient reassessed. She is feeling moderately improved. She now rates her pain as 5/10 in severity.  Patient states that the migraine cocktail took away the tingling in her left face as well as helped improve the pain.  She still has some residual left-sided headache, but the facial pain has resolved and now reports only intermittent ear pain.  The patient reports having some numbness. Discussed follow up with neurology and OMFS.    8:34 PM - Patient will be treated with Tylenol 650 mg.    8:40 PM - Paged Neuro.    8:41 PM - I discussed the patient's case and the above findings with Dr. Cortez (Neuro) who said the patient's presentation sounds like complex migraine. No need for MRI scan. However, the patient will need neuro follow up for preventative medications.    9:08 PM - Patient updated on my conversation with Dr. Cortez. Discussed complex migraine with the patient. Counseled the patient on taking Phenergan, benadryl, and NSAIDs the minute she feels a migraine begin at home. Instructed the patient to follow up with Neurology and Oral Surgery following her ED visit. I  discussed plan of discharge and strict return precautions for any new or worsening symptoms. The patient verbalizes agreement and understands.      Patient presents to the ER complaining of left-sided migraine headache with associated left-sided facial pain and  intermittent left-sided ear pain with associated tingling to the left face.  All of her symptoms started simultaneously 3 days ago and have been constant over the last 3 days.  She has a history of migraine headaches.  She gets 5 migraines a week and has every week for many years.  She follows with [facility] but is never seen a neurologist.  She tried Imitrex once but said it made her heart race so she does not use it anymore.  She also tried physical therapy thinking that maybe her headaches are secondary to neck problems.  That did not help either.  Patient says her headaches moved between the right side and the left side.  She has had some tingling in the right face before with her migraine headaches.  She is never had tingling in the left face.  This particular headache is worse than normal migraines, but is very similar in terms of character and quality.  Headache today is located on the left side.  Typically she does not have the associated left-sided facial pain and she does not have associated left ear pain.  She says she is never had tingling on the left side of her face with her migraines before either.  No tingling to arms or legs.  No weakness of arms or legs.  No visual discomfort.  No fevers or chills.  No stiff neck.  No vertigo.  No trauma or injury to her head or neck.  No facial drooping or slurred speech.  Patient has a normal neurologic examination.  She is tender with palpation of the frontal sinus region, the maxillary sinus region.  She is also tender over the nose and over the jawline.  She is tender with palpation of the left TMJ with opening and closing her mouth.  She denies dental pain.  No dental pain with  percussion.  She complains of ear pain.  She says is intermittent.  She says that sharp.  Her TM is clear.  No evidence of otitis media or otitis externa.  No nuchal rigidity.  The headache is unilateral.  No concerns for meningitis.  Headache feels very similar to previous migraines.  No concern for subarachnoid hemorrhage.  She denies any recent cough cold or flulike symptoms.  No nasal discharge.  Not think she has sinusitis.  Patient was complaining of a lot of face pain and ear pain in conjunction with her headache, I went ahead and did a CT/CTA of her head and neck.  No vertebral artery dissection.  No carotid artery dissection.  I spoke with Dr. Cortez, neurologist on-call.  He says if this would have been a stroke we would have seen something on the CT/CTA after 3 days of constant symptoms.  Additionally, patient had a stroke she would have other neurologic symptoms and other neurologic findings on examination.  He does not think she needs MRI.  She has history of having tingling in her right face with her right-sided migraine headaches.  He thinks that the tingling in the left face today is secondary to a complicated migraine.  Additionally, patient reports that the face feels tingly and painful.  This is not consistent with stroke.  Dr. Cortez feels that the patient is safe to be discharged home with diagnosis of complicated migraine but feels she would benefit from follow-up with the headache clinic.  She likely might benefit from preventative migraine therapy or Botox.  Patient is feeling better with a migraine cocktail.  She still has some residual headache so I gave her some Tylenol and some morphine prior to discharge.  Patient will go home with Phenergan.  Have suggested that she take Phenergan, Benadryl, Advil and Tylenol at onset of headache.  Perhaps she can abort a headache with these medications if she takes the medicines and then quickly goes into a dark quiet room to sleep off the  migraine.  May be that we will temporize her until she can get in with the neurologist.  Patient is safe and stable for outpatient management discharge home at this time.  Neurology does not think she needs further imaging or inpatient evaluation.  Neurology does not think the patient symptoms are consistent with stroke.  At this time she has been given strict return precautions and discharge instructions and she understands treatment plan and follow-up.    HYDRATION: Based on the patient's presentation of Other Migraine the patient was given IV fluids. IV Hydration was used because oral hydration was not as rapid as required. Upon recheck following hydration, the patient was improved.    The patient will return for new or worsening symptoms and is stable at the time of discharge.    The patient is referred to a primary physician for blood pressure management, diabetic screening, and for all other preventative health concerns.    DISPOSITION:  Patient will be discharged home in stable condition.    FOLLOW UP:  Minesh Claros D.D.S.  [address]  [place] [address]  [phone]    Schedule an appointment as soon as possible for a visit in 3 days  If symptoms worsen return to ER    [facility]  [address] [place] [address]  [facility] [id number] [phone]  Schedule an appointment as soon as possible for a visit in 1 week  If symptoms worsen return to ER      OUTPATIENT MEDICATIONS:  Discharge Medication List as of 6/11/2021  9:57 PM      START taking these medications    Details   promethazine (PHENERGAN) 25 MG Tab Take 1 tablet by mouth every 8 hours as needed for Nausea/Vomiting for up to 2 days., Disp-6 tablet, R-0, Normal           FINAL IMPRESSION  1. Left ear pain Acute   2. Complicated migraine Acute   3. Arthralgia of left temporomandibular joint Acute        This dictation has been created using voice recognition software. The accuracy of the dictation is limited by the abilities of the software. I  expect there may be some errors of grammar and possibly content. I made every attempt to manually correct the errors within my dictation. However, errors related to voice recognition software may still exist and should be interpreted within the appropriate context.    IJacinta (Alee), am scribing for, and in the presence of, Ayana Cabrera M.D..    Electronically signed by: Jacinta Jay (Alee), 6/11/2021    Ayana ARCHULETA M.D. personally performed the services described in this documentation, as scribed by Jacinta Jay in my presence, and it is both accurate and complete.    C.    The note accurately reflects work and decisions made by me.  Ayana Cabrera M.D.  6/12/2021  12:48 AM

## 2021-06-12 NOTE — DISCHARGE INSTRUCTIONS
Follow-up with one of the neurologist at the headache clinic here at [facility] this coming week.  Please call for appointment.    Also follow-up with Dr. Claros, oral maxillofacial surgeon, this coming week.  Please call for appointment.    Use warm compresses on your face to help with the pain.    If you start to develop a another migraine headache, please take one of the prescribed Phenergan tablets, 4 over-the-counter Advil tablets, 2 over-the-counter Tylenol tablets, and 2 over-the-counter Benadryl tablets.  Immediately after taking your medications, go to a dark quiet room to get some sleep.    Return to the ER for any worsening headache, changing headache, recurrent tingling to your face, visual changes such as blurry vision or double vision, vertigo, facial drooping, difficulty speaking, numbness/tingling/weakness to extremities, difficulty walking, or for any concerns.    Drink plenty of fluids to stay well-hydrated.    Also follow-up with your primary care physician within the next 1 to 2 days.  Please call for appointment.

## 2021-10-30 ENCOUNTER — HOSPITAL ENCOUNTER (EMERGENCY)
Facility: MEDICAL CENTER | Age: 31
End: 2021-10-30
Payer: MEDICAID

## 2021-10-30 VITALS
OXYGEN SATURATION: 99 % | TEMPERATURE: 97.4 F | HEART RATE: 82 BPM | SYSTOLIC BLOOD PRESSURE: 119 MMHG | WEIGHT: 234.79 LBS | BODY MASS INDEX: 41.6 KG/M2 | HEIGHT: 63 IN | DIASTOLIC BLOOD PRESSURE: 84 MMHG | RESPIRATION RATE: 20 BRPM

## 2021-10-30 PROCEDURE — 302449 STATCHG TRIAGE ONLY (STATISTIC)

## 2021-10-30 ASSESSMENT — FIBROSIS 4 INDEX: FIB4 SCORE: 0.83

## 2021-10-31 NOTE — ED TRIAGE NOTES
Chief Complaint   Patient presents with   • Avulsion     Left thumb vs vegetable landon, pressure dressing applied, bleeding controlled.      Patient to triage via ambulation, with a steady gait, patient A&O x4.  Appropriate precautions in place.     Pressure dressing applied, bleeding controlled.  Patient admits to 4 ETOH drinks this evening.     Explained wait time and triage process to pt. Pt placed back in lobby, told to notify ED tech or triage RN of any changes, verbalized understanding.

## 2021-12-12 ENCOUNTER — HOSPITAL ENCOUNTER (EMERGENCY)
Facility: MEDICAL CENTER | Age: 31
End: 2021-12-12
Attending: EMERGENCY MEDICINE
Payer: MEDICAID

## 2021-12-12 VITALS
OXYGEN SATURATION: 93 % | TEMPERATURE: 98.2 F | WEIGHT: 230.6 LBS | SYSTOLIC BLOOD PRESSURE: 102 MMHG | BODY MASS INDEX: 40.86 KG/M2 | HEIGHT: 63 IN | RESPIRATION RATE: 18 BRPM | HEART RATE: 65 BPM | DIASTOLIC BLOOD PRESSURE: 50 MMHG

## 2021-12-12 DIAGNOSIS — R51.9 NONINTRACTABLE HEADACHE, UNSPECIFIED CHRONICITY PATTERN, UNSPECIFIED HEADACHE TYPE: ICD-10-CM

## 2021-12-12 PROCEDURE — 700111 HCHG RX REV CODE 636 W/ 250 OVERRIDE (IP): Performed by: EMERGENCY MEDICINE

## 2021-12-12 PROCEDURE — 99284 EMERGENCY DEPT VISIT MOD MDM: CPT

## 2021-12-12 PROCEDURE — 96375 TX/PRO/DX INJ NEW DRUG ADDON: CPT

## 2021-12-12 PROCEDURE — 96365 THER/PROPH/DIAG IV INF INIT: CPT

## 2021-12-12 RX ORDER — METOCLOPRAMIDE 10 MG/1
10 TABLET ORAL 4 TIMES DAILY PRN
Qty: 20 TABLET | Refills: 0 | Status: SHIPPED | OUTPATIENT
Start: 2021-12-12 | End: 2023-04-19

## 2021-12-12 RX ORDER — MAGNESIUM SULFATE HEPTAHYDRATE 40 MG/ML
2 INJECTION, SOLUTION INTRAVENOUS ONCE
Status: COMPLETED | OUTPATIENT
Start: 2021-12-12 | End: 2021-12-12

## 2021-12-12 RX ORDER — KETOROLAC TROMETHAMINE 30 MG/ML
30 INJECTION, SOLUTION INTRAMUSCULAR; INTRAVENOUS ONCE
Status: COMPLETED | OUTPATIENT
Start: 2021-12-12 | End: 2021-12-12

## 2021-12-12 RX ORDER — METOCLOPRAMIDE HYDROCHLORIDE 5 MG/ML
10 INJECTION INTRAMUSCULAR; INTRAVENOUS ONCE
Status: COMPLETED | OUTPATIENT
Start: 2021-12-12 | End: 2021-12-12

## 2021-12-12 RX ORDER — OXYCODONE HYDROCHLORIDE AND ACETAMINOPHEN 5; 325 MG/1; MG/1
1 TABLET ORAL EVERY 4 HOURS PRN
Qty: 20 TABLET | Refills: 0 | Status: SHIPPED | OUTPATIENT
Start: 2021-12-12 | End: 2021-12-17

## 2021-12-12 RX ADMIN — METOCLOPRAMIDE 10 MG: 5 INJECTION, SOLUTION INTRAMUSCULAR; INTRAVENOUS at 15:38

## 2021-12-12 RX ADMIN — KETOROLAC TROMETHAMINE 30 MG: 30 INJECTION, SOLUTION INTRAMUSCULAR; INTRAVENOUS at 15:40

## 2021-12-12 RX ADMIN — MAGNESIUM SULFATE 2 G: 2 INJECTION INTRAVENOUS at 15:41

## 2021-12-12 ASSESSMENT — PAIN DESCRIPTION - PAIN TYPE
TYPE: ACUTE PAIN
TYPE: ACUTE PAIN

## 2021-12-12 ASSESSMENT — LIFESTYLE VARIABLES: DO YOU DRINK ALCOHOL: NO

## 2021-12-12 ASSESSMENT — FIBROSIS 4 INDEX: FIB4 SCORE: 0.83

## 2021-12-12 NOTE — ED NOTES
Break RN note - Pt placed on monitor, ERP at bedside.   Pt reports HA x 2 weeks since booster for covid. Reports pain when moving eyes, +photophobia. Lights dimmed for comfort.

## 2021-12-12 NOTE — ED PROVIDER NOTES
ED Provider Note    CHIEF COMPLAINT  Chief Complaint   Patient presents with   • Headache     x2weeks. Reports hx of migraines   • Dizziness       HPI  Abril Vance is a 31 y.o. female who presents with a history of migraine headaches.  She reports that 10 days ago she got the Covid vaccine and the next day she developed a right-sided headache that is consistent with her migraine.  However it has not gone away.  She denies any focal neurologic deficits.  She denies fever, she denies cough or other symptoms.  She usually just takes Tylenol at home and this has not been helping her headache.  She reports she has been imaged multiple times in the past for migraine headaches she has had them since she was 11 years old.    REVIEW OF SYSTEMS  See HPI for further details. All other systems are negative.     PAST MEDICAL HISTORY   has a past medical history of Arthritis, Gallstones, History of anemia, Migraines (4/5/2010), Morbid obesity (HCC) (4/5/2010), Sleep apnea, Snoring, and Vaginal trichomoniasis (4/5/2010).    SOCIAL HISTORY  Social History     Tobacco Use   • Smoking status: Never Smoker   • Smokeless tobacco: Never Used   Vaping Use   • Vaping Use: Never used   Substance and Sexual Activity   • Alcohol use: Yes   • Drug use: No   • Sexual activity: Not on file       SURGICAL HISTORY   has a past surgical history that includes lauren by laparoscopy (10/14/2010); other (2016); vaginal hysterectomy scope total (9/25/2018); salpingectomy (Bilateral, 9/25/2018); anterior and posterior repair (9/25/2018); enterocele repair (9/25/2018); bladder sling female (9/25/2018); and vaginal suspension (9/25/2018).    CURRENT MEDICATIONS  Home Medications     Reviewed by Kelly Mejia R.N. (Registered Nurse) on 12/12/21 at 1343  Med List Status: Not Addressed   Medication Last Dose Status   acetaminophen (TYLENOL) 325 MG Tab  Active   IRON PO  Active   ondansetron (ZOFRAN ODT) 4 MG TABLET DISPERSIBLE  Active  "  phentermine (ADIPEX-P) 37.5 MG tablet  Active                ALLERGIES  Allergies   Allergen Reactions   • Nkda [No Known Drug Allergy]        FAMILY HISTORY  No pertinent family history    PHYSICAL EXAM  VITAL SIGNS: BP (!) 91/53   Pulse 65   Temp 36.8 °C (98.3 °F) (Temporal)   Resp 18   Ht 1.6 m (5' 3\")   Wt 105 kg (230 lb 9.6 oz)   LMP 09/20/2018 (Approximate)   SpO2 93%   BMI 40.85 kg/m²  @SHANKAR[[id number]::@   Pulse ox interpretation: I interpret this pulse ox as normal.  Constitutional: Alert, appears uncomfortable with migraine headache symptoms.  HENT: No signs of trauma, Bilateral external ears normal, Nose normal.   Eyes: Pupils are equal and reactive, Conjunctiva normal, Non-icteric.   Neck: Normal range of motion, No tenderness, Supple, No stridor.   Lymphatic: No lymphadenopathy noted.   Cardiovascular: Regular rate and rhythm, no murmurs.   Thorax & Lungs: Normal breath sounds, No respiratory distress, No wheezing, No chest tenderness.   Abdomen: Bowel sounds normal, Soft, No tenderness, No masses, No pulsatile masses. No peritoneal signs.  Skin: Warm, Dry, No erythema, No rash.   Back: No bony tenderness, No CVA tenderness.   Extremities: Intact distal pulses, No edema, No tenderness, No cyanosis.  Musculoskeletal: Good range of motion in all major joints. No tenderness to palpation or major deformities noted.   Neurologic: Alert , Normal motor function, Normal sensory function, No focal deficits noted.   Psychiatric: Affect normal, Judgment normal, Mood normal.           COURSE & MEDICAL DECISION MAKING  Pertinent Labs & Imaging studies reviewed. (See chart for details)    The patient presents with migraine headache symptoms and a history of migraine headaches.  I reviewed the previous chart, the patient has had 4 previous head CTs in our computer record, the first in 2005, then 2 in 2020 and the most recent was a CTA head and neck June 11, 2021.  They were all read as normal.    I offered the " patient Imitrex, she said that this gives her bad side effect of racing heart.  After discussion with the pharmacist Humberto we have decided that we will give her magnesium, Reglan, and Toradol IV.    4:15 PM the patient feels markedly improved, she will be discharged with a prescription for Percocet and Reglan, she will follow up with the neurology Orosi for preventive medicine for migraine headaches.    I reviewed prescription monitoring program for patient's narcotic use before prescribing a scheduled drug.The patient will not drink alcohol nor drive with prescribed medications. The patient will return for new or worsening symptoms and is stable at the time of discharge.    The patient is referred to a primary physician for blood pressure management, diabetic screening, and for all other preventative health concerns.    In prescribing controlled substances to this patient, I certify that I have obtained and reviewed the medical history of Abril Vance. I have also made a good chris effort to obtain applicable records from other providers who have treated the patient and records did not demonstrate any increased risk of substance abuse that would prevent me from prescribing controlled substances.     I have conducted a physical exam and documented it. I have reviewed Ms. Vance’s prescription history as maintained by the [place] Prescription Monitoring Program.     I have assessed the patient’s risk for abuse, dependency, and addiction using the validated Opioid Risk Tool available at https://www.mdcalc.com/einqqz-fiqz-noee-ort-narcotic-abuse.     Given the above, I believe the benefits of controlled substance therapy outweigh the risks. The reasons for prescribing controlled substances include non-narcotic, oral analgesic alternatives have been inadequate for pain control. Accordingly, I have discussed the risk and benefits, treatment plan, and alternative therapies with the patient.        DISPOSITION:  Patient will be discharged home in stable condition.    FOLLOW UP:  [facility], Emergency Dept  [address] [id number] [phone]    If symptoms worsen    Marleny Faustin M.D.  [address] [address]  [phone]      As needed    [facility] [place]  [address] [id number] [phone]    Call for follow-up, you may want to start on a medicine that prevents headaches      OUTPATIENT MEDICATIONS:  New Prescriptions    METOCLOPRAMIDE (REGLAN) 10 MG TAB    Take 1 Tablet by mouth 4 times a day as needed (headache and nausea).    OXYCODONE-ACETAMINOPHEN (PERCOCET) 5-325 MG TAB    Take 1 Tablet by mouth every four hours as needed for up to 5 days.               The patient will not drink alcohol nor drive with prescribed medications. The patient will return for worsening symptoms and is stable at the time of discharge. The patient verbalizes understanding and will comply.    FINAL IMPRESSION  1. Nonintractable headache, unspecified chronicity pattern, unspecified headache type  oxyCODONE-acetaminophen (PERCOCET) 5-325 MG Tab    metoclopramide (REGLAN) 10 MG Tab              Electronically signed by: Landon Helton M.D., 12/12/2021 3:03 PM

## 2021-12-12 NOTE — ED TRIAGE NOTES
"Chief Complaint   Patient presents with   • Headache     x2weeks. Reports hx of migraines   • Dizziness       32 yo female to triage for above complaint. Patient reports HA with intermittent dizziness x2weeks. Patient endorses light sensitivity also. Patient states the pain occasionally radiates down neck and R shoulder as well.     Pt is alert and oriented, speaking in full sentences, follows commands and responds appropriately to questions.     Patient placed back in lobby and educated on triage process. Asked to inform RN of any changes.    /78   Pulse 70   Temp 36.8 °C (98.3 °F) (Temporal)   Resp 14   Ht 1.6 m (5' 3\")   Wt 105 kg (230 lb 9.6 oz)   LMP 09/20/2018 (Approximate)   SpO2 95%   BMI 40.85 kg/m²     "

## 2021-12-13 NOTE — ED NOTES
Patient resting on gurney, lights dimmed for comfort, respirations even and unlabored.  Patient medicated per order, see MAR.

## 2021-12-13 NOTE — ED NOTES
"Patient resting on gurney, reports headache \"much better\"; per ERP stop Mag infusion, dc patient.   "

## 2021-12-13 NOTE — ED NOTES
PIV removed and bandaged.  Abril Vance discharged via ambulation with self.  Discharge instructions given and reviewed, patient educated to follow up with PCP/neurologist, verbalized understanding.  Prescriptions given x 1, sent electronically.  All personal belongings in possession.  No questions at this time.

## 2022-06-02 ENCOUNTER — APPOINTMENT (OUTPATIENT)
Dept: RADIOLOGY | Facility: MEDICAL CENTER | Age: 32
End: 2022-06-02
Attending: EMERGENCY MEDICINE
Payer: COMMERCIAL

## 2022-06-02 ENCOUNTER — HOSPITAL ENCOUNTER (EMERGENCY)
Facility: MEDICAL CENTER | Age: 32
End: 2022-06-02
Attending: EMERGENCY MEDICINE
Payer: COMMERCIAL

## 2022-06-02 VITALS
OXYGEN SATURATION: 97 % | BODY MASS INDEX: 40.47 KG/M2 | TEMPERATURE: 97.5 F | WEIGHT: 228.4 LBS | HEIGHT: 63 IN | SYSTOLIC BLOOD PRESSURE: 106 MMHG | DIASTOLIC BLOOD PRESSURE: 59 MMHG | HEART RATE: 60 BPM | RESPIRATION RATE: 16 BRPM

## 2022-06-02 DIAGNOSIS — R07.9 CHEST PAIN, UNSPECIFIED TYPE: ICD-10-CM

## 2022-06-02 LAB
ALBUMIN SERPL BCP-MCNC: 4.7 G/DL (ref 3.2–4.9)
ALBUMIN/GLOB SERPL: 1.6 G/DL
ALP SERPL-CCNC: 151 U/L (ref 30–99)
ALT SERPL-CCNC: 23 U/L (ref 2–50)
ANION GAP SERPL CALC-SCNC: 11 MMOL/L (ref 7–16)
AST SERPL-CCNC: 34 U/L (ref 12–45)
BASOPHILS # BLD AUTO: 1.1 % (ref 0–1.8)
BASOPHILS # BLD: 0.06 K/UL (ref 0–0.12)
BILIRUB SERPL-MCNC: 0.6 MG/DL (ref 0.1–1.5)
BUN SERPL-MCNC: 8 MG/DL (ref 8–22)
CALCIUM SERPL-MCNC: 8.9 MG/DL (ref 8.5–10.5)
CHLORIDE SERPL-SCNC: 104 MMOL/L (ref 96–112)
CO2 SERPL-SCNC: 25 MMOL/L (ref 20–33)
CREAT SERPL-MCNC: 0.61 MG/DL (ref 0.5–1.4)
EKG IMPRESSION: NORMAL
EOSINOPHIL # BLD AUTO: 0.06 K/UL (ref 0–0.51)
EOSINOPHIL NFR BLD: 1.1 % (ref 0–6.9)
ERYTHROCYTE [DISTWIDTH] IN BLOOD BY AUTOMATED COUNT: 47.8 FL (ref 35.9–50)
GFR SERPLBLD CREATININE-BSD FMLA CKD-EPI: 120 ML/MIN/1.73 M 2
GLOBULIN SER CALC-MCNC: 3 G/DL (ref 1.9–3.5)
GLUCOSE SERPL-MCNC: 91 MG/DL (ref 65–99)
HCT VFR BLD AUTO: 41.7 % (ref 37–47)
HGB BLD-MCNC: 13.8 G/DL (ref 12–16)
IMM GRANULOCYTES # BLD AUTO: 0.02 K/UL (ref 0–0.11)
IMM GRANULOCYTES NFR BLD AUTO: 0.4 % (ref 0–0.9)
LYMPHOCYTES # BLD AUTO: 1.5 K/UL (ref 1–4.8)
LYMPHOCYTES NFR BLD: 28.3 % (ref 22–41)
MCH RBC QN AUTO: 33.4 PG (ref 27–33)
MCHC RBC AUTO-ENTMCNC: 33.1 G/DL (ref 33.6–35)
MCV RBC AUTO: 101 FL (ref 81.4–97.8)
MONOCYTES # BLD AUTO: 0.58 K/UL (ref 0–0.85)
MONOCYTES NFR BLD AUTO: 10.9 % (ref 0–13.4)
NEUTROPHILS # BLD AUTO: 3.08 K/UL (ref 2–7.15)
NEUTROPHILS NFR BLD: 58.2 % (ref 44–72)
NRBC # BLD AUTO: 0 K/UL
NRBC BLD-RTO: 0 /100 WBC
PLATELET # BLD AUTO: 276 K/UL (ref 164–446)
PMV BLD AUTO: 9.9 FL (ref 9–12.9)
POTASSIUM SERPL-SCNC: 4.2 MMOL/L (ref 3.6–5.5)
PROT SERPL-MCNC: 7.7 G/DL (ref 6–8.2)
RBC # BLD AUTO: 4.13 M/UL (ref 4.2–5.4)
SODIUM SERPL-SCNC: 140 MMOL/L (ref 135–145)
TROPONIN T SERPL-MCNC: <6 NG/L (ref 6–19)
WBC # BLD AUTO: 5.3 K/UL (ref 4.8–10.8)

## 2022-06-02 PROCEDURE — 85025 COMPLETE CBC W/AUTO DIFF WBC: CPT

## 2022-06-02 PROCEDURE — 99285 EMERGENCY DEPT VISIT HI MDM: CPT

## 2022-06-02 PROCEDURE — A9270 NON-COVERED ITEM OR SERVICE: HCPCS | Performed by: EMERGENCY MEDICINE

## 2022-06-02 PROCEDURE — 700102 HCHG RX REV CODE 250 W/ 637 OVERRIDE(OP): Performed by: EMERGENCY MEDICINE

## 2022-06-02 PROCEDURE — 71045 X-RAY EXAM CHEST 1 VIEW: CPT

## 2022-06-02 PROCEDURE — 96375 TX/PRO/DX INJ NEW DRUG ADDON: CPT

## 2022-06-02 PROCEDURE — 93005 ELECTROCARDIOGRAM TRACING: CPT | Performed by: EMERGENCY MEDICINE

## 2022-06-02 PROCEDURE — 80053 COMPREHEN METABOLIC PANEL: CPT

## 2022-06-02 PROCEDURE — 700111 HCHG RX REV CODE 636 W/ 250 OVERRIDE (IP): Performed by: EMERGENCY MEDICINE

## 2022-06-02 PROCEDURE — 96374 THER/PROPH/DIAG INJ IV PUSH: CPT

## 2022-06-02 PROCEDURE — 93005 ELECTROCARDIOGRAM TRACING: CPT

## 2022-06-02 PROCEDURE — 36415 COLL VENOUS BLD VENIPUNCTURE: CPT

## 2022-06-02 PROCEDURE — 84484 ASSAY OF TROPONIN QUANT: CPT

## 2022-06-02 RX ORDER — ACETAMINOPHEN 325 MG/1
650 TABLET ORAL ONCE
Status: COMPLETED | OUTPATIENT
Start: 2022-06-02 | End: 2022-06-02

## 2022-06-02 RX ORDER — ASPIRIN 81 MG/1
324 TABLET, CHEWABLE ORAL ONCE
Status: COMPLETED | OUTPATIENT
Start: 2022-06-02 | End: 2022-06-02

## 2022-06-02 RX ORDER — LORAZEPAM 2 MG/ML
1 INJECTION INTRAMUSCULAR ONCE
Status: COMPLETED | OUTPATIENT
Start: 2022-06-02 | End: 2022-06-02

## 2022-06-02 RX ORDER — KETOROLAC TROMETHAMINE 30 MG/ML
30 INJECTION, SOLUTION INTRAMUSCULAR; INTRAVENOUS ONCE
Status: COMPLETED | OUTPATIENT
Start: 2022-06-02 | End: 2022-06-02

## 2022-06-02 RX ADMIN — ASPIRIN 81 MG 324 MG: 81 TABLET ORAL at 10:51

## 2022-06-02 RX ADMIN — LIDOCAINE HYDROCHLORIDE 30 ML: 20 SOLUTION OROPHARYNGEAL at 10:51

## 2022-06-02 RX ADMIN — LORAZEPAM 1 MG: 2 INJECTION INTRAMUSCULAR; INTRAVENOUS at 10:50

## 2022-06-02 RX ADMIN — ACETAMINOPHEN 650 MG: 325 TABLET ORAL at 12:13

## 2022-06-02 RX ADMIN — KETOROLAC TROMETHAMINE 30 MG: 30 INJECTION, SOLUTION INTRAMUSCULAR; INTRAVENOUS at 12:13

## 2022-06-02 ASSESSMENT — LIFESTYLE VARIABLES: DO YOU DRINK ALCOHOL: NO

## 2022-06-02 NOTE — ED NOTES
Medication given per MAR. PIV removed. Discharge instructions discussed with pt, verbalizes understanding. All belongings with pt when leaving unit. Pt amb with steady gait.

## 2022-06-02 NOTE — DISCHARGE INSTRUCTIONS
You ruled out for pulmonary embolism by PERC criteria  I believe your pleuritic chest discomfort is a result of pleurisy rather than cardiac etiology and please take ibuprofen every 6 hours when awake along with Tylenol as needed.  This is an inflammatory process that should go away on its own    Please return if you develop a fever of 100.5 or greater with cough congestion and signs of respiratory illness

## 2022-06-02 NOTE — ED PROVIDER NOTES
ED Provider Note    Scribed for Lalit Wilson M.D. by Tito Benjamin. 6/2/2022  10:32 AM    Primary care provider: Marleny Faustin M.D.  Means of arrival: Walk-in  History obtained from: Patient  History limited by: None    CHIEF COMPLAINT  Chief Complaint   Patient presents with   • Chest Pain     Right neck radiates down. Onset 2030 constant    • Jaw Pain     Right jaw 2030   • Spasms     Fingers, toes and tongue, tingling.        HPI  Abril Vance is a 31 y.o. female who presents to the Emergency Department for evaluation of chest pain onset yesterday evening. She describes the pain as a pressure. She states that last night she ate dinner, but was unable to keep any food down, and had an episode of vomiting. She notes that after this episode her symptoms were not alleviated. The patient has associated symptoms of tingling in her fingers, toes, and tongue, and right sided jaw and shoulder pain. The patient notes that the chest pain was waxing and waning until 5.5 hours ago, then it became constant. Her jaw and shoulder pain has always been constant.  The patient states her symptoms kept her awake all night. She denies any chest pain, leg pain, abdominal pain, or burning in the chest pain. She denies family history of heart disease.     REVIEW OF SYSTEMS  Pertinent negatives include no chest pain, leg pain, abdominal pain, or burning in the chest. As above, all other systems reviewed and are negative.   See HPI for further details.     PAST MEDICAL HISTORY   has a past medical history of Arthritis, Gallstones, History of anemia, Migraines (4/5/2010), Morbid obesity (HCC) (4/5/2010), Sleep apnea, Snoring, and Vaginal trichomoniasis (4/5/2010).    SURGICAL HISTORY   has a past surgical history that includes lauren by laparoscopy (10/14/2010); other (2016); vaginal hysterectomy scope total (9/25/2018); salpingectomy (Bilateral, 9/25/2018); anterior and posterior repair (9/25/2018); enterocele repair  "(9/25/2018); bladder sling female (9/25/2018); and vaginal suspension (9/25/2018).    SOCIAL HISTORY  Social History     Tobacco Use   • Smoking status: Never Smoker   • Smokeless tobacco: Never Used   Vaping Use   • Vaping Use: Never used   Substance Use Topics   • Alcohol use: Yes   • Drug use: No      Social History     Substance and Sexual Activity   Drug Use No       FAMILY HISTORY  Family History   Family history unknown: Yes       CURRENT MEDICATIONS  Home Medications     Reviewed by Shelly Lord R.N. (Registered Nurse) on 06/02/22 at [facility]  Med List Status: Not Addressed   Medication Last Dose Status   acetaminophen (TYLENOL) 325 MG Tab  Active   IRON PO  Active   metoclopramide (REGLAN) 10 MG Tab  Active   ondansetron (ZOFRAN ODT) 4 MG TABLET DISPERSIBLE  Active   phentermine (ADIPEX-P) 37.5 MG tablet  Active                ALLERGIES  Allergies   Allergen Reactions   • Nkda [No Known Drug Allergy]        PHYSICAL EXAM  VITAL SIGNS: BP (!) 126/90   Pulse 76   Temp 36.2 °C (97.1 °F) (Temporal)   Resp 18   Ht 1.6 m (5' 3\")   Wt 104 kg (228 lb 6.3 oz)   LMP 09/20/2018 (Approximate)   SpO2 99%   BMI 40.46 kg/m²   Constitutional: Well developed, Well nourished, No acute distress, Non-toxic appearance.   HENT: Normocephalic, Atraumatic, Bilateral external ears normal, Oropharynx is clear mucous membranes are moist. No oral exudates or nasal discharge.   Eyes: Pupils are equal round and reactive, EOMI, Conjunctiva normal, No discharge.   Neck: Normal range of motion, No tenderness, Supple, No stridor. No meningismus.  Lymphatic: No lymphadenopathy noted.   Cardiovascular: Regular rate and rhythm without murmur rub or gallop.  Thorax & Lungs: Clear breath sounds bilaterally without wheezes, rhonchi or rales. There is no chest wall tenderness.   Abdomen: Soft non-tender non-distended. There is no rebound or guarding. No organomegaly is appreciated. Bowel sounds are normal.  Skin: Normal without rash. "   Back: No CVA or spinal tenderness.   Extremities: Intact distal pulses, No edema, No tenderness, No cyanosis, No clubbing. Capillary refill is less than 2 seconds.  Musculoskeletal: Good range of motion in all major joints. No tenderness to palpation or major deformities noted.   Neurologic: Alert & oriented x 3, Normal motor function, Normal sensory function, No focal deficits noted. Reflexes are normal.  Psychiatric: Affect normal, Judgment normal, Mood normal. There is no suicidal ideation or patient reported hallucinations.       DIAGNOSTIC STUDIES / PROCEDURES    LABS  Labs Reviewed   CBC WITH DIFFERENTIAL - Abnormal; Notable for the following components:       Result Value    RBC 4.13 (*)     .0 (*)     MCH 33.4 (*)     MCHC 33.1 (*)     All other components within normal limits   COMP METABOLIC PANEL - Abnormal; Notable for the following components:    Alkaline Phosphatase 151 (*)     All other components within normal limits   TROPONIN   ESTIMATED GFR      All labs reviewed by me.    EKG Interpretation:  Results for orders placed or performed during the hospital encounter of 22   EKG   Result Value Ref Range    Report       [facility] Emergency Dept.    Test Date:  2022  Pt Name:    CHANTE MENARD                Department: ER  MRN:        [id number]                      Room:  Gender:     Female                       Technician: [id number]  :        [id number]                   Requested By:ER TRIAGE PROTOCOL  Order #:    [id number]                    Reading MD: STEPHEN BUCHANAN MD    Measurements  Intervals                                Axis  Rate:       73                           P:          58  UT:         126                          QRS:        55  QRSD:       98                           T:          6  QT:         410  QTc:        452    Interpretive Statements  Sinus rhythm  Baseline wander in lead(s) II,III,aVR,aVF,V1  Compared to ECG 2018 13:41:33  No  significant changes  Electronically Signed On 6-2-2022 12:02:53 PDT by STEPHEN BUCHANAN MD           RADIOLOGY  DX-CHEST-PORTABLE (1 VIEW)   Final Result      1.  No acute cardiac or pulmonary abnormalities are identified.        The radiologist's interpretation of all radiological studies have been reviewed by me.    COURSE & MEDICAL DECISION MAKING  Nursing notes, VS, PMSFHx reviewed in chart.    10:32 AM Patient seen and examined at bedside. Ordered for CXR,EKG, and labs to evaluate. Patient was treated with Ativan 1mg injection, GI Cocktail 30 ml, and  mg tablet for her symptoms. Pulmonary Embolism rule out by PERC criteria.     EKG demonstrates no evidence of acute ischemic changes or dysrhythmia.  Chest x-ray is unremarkable showing no mass, cardiomegaly or airspace disease.  I have a very low suspicion for acute coronary syndrome and I think this is pleuritic and likely pleurisy    11:34 AM Patient was reevaluated at bedside. Patient will be treated with Tylenol  650 mg tablet, and Toradol 30 mg injection.     12:05 PM - I reevaluated the patient at bedside. I discussed the patient's diagnostic study results. I discussed plan for discharge and follow up as outlined below. The patient is stable for discharge at this time and will return for any new or worsening symptoms. Patient verbalizes understanding and support with my plan for discharge.       The patient will return for new or worsening symptoms and is stable at the time of discharge.    DISPOSITION:  Patient will be discharged home in stable condition.    FINAL IMPRESSION  1. Chest pain, unspecified type          I, Tito Benjamin (Scribe), aksia scribing for, and in the presence of, Stephen Buchanan M.D..    Electronically signed by: Tito Benjamin (Alee), 6/2/2022    IStephen M.D. personally performed the services described in this documentation, as scribed by Tito Benjamin in my presence, and it is both accurate and complete.    The note  accurately reflects work and decisions made by me.  Lalit Wilson M.D.  6/2/2022  1:22 PM

## 2022-06-02 NOTE — ED TRIAGE NOTES
.  Chief Complaint   Patient presents with   • Chest Pain     Right neck radiates down. Onset 2030 constant    • Jaw Pain     Right jaw 2030   • Spasms     Fingers, toes and tongue, tingling.      Ambulated to triage. EKG complete on arrival. Pt anxious coached on breathing and reassured.

## 2022-07-13 ENCOUNTER — APPOINTMENT (OUTPATIENT)
Dept: RADIOLOGY | Facility: MEDICAL CENTER | Age: 32
End: 2022-07-13
Attending: EMERGENCY MEDICINE
Payer: COMMERCIAL

## 2022-07-13 ENCOUNTER — HOSPITAL ENCOUNTER (EMERGENCY)
Facility: MEDICAL CENTER | Age: 32
End: 2022-07-13
Attending: EMERGENCY MEDICINE
Payer: COMMERCIAL

## 2022-07-13 VITALS
SYSTOLIC BLOOD PRESSURE: 94 MMHG | HEART RATE: 63 BPM | WEIGHT: 228.62 LBS | TEMPERATURE: 98.2 F | HEIGHT: 63 IN | RESPIRATION RATE: 19 BRPM | DIASTOLIC BLOOD PRESSURE: 53 MMHG | BODY MASS INDEX: 40.51 KG/M2 | OXYGEN SATURATION: 93 %

## 2022-07-13 DIAGNOSIS — R11.2 NAUSEA AND VOMITING, INTRACTABILITY OF VOMITING NOT SPECIFIED, UNSPECIFIED VOMITING TYPE: ICD-10-CM

## 2022-07-13 DIAGNOSIS — R10.12 LEFT UPPER QUADRANT ABDOMINAL PAIN: ICD-10-CM

## 2022-07-13 LAB
ALBUMIN SERPL BCP-MCNC: 4.6 G/DL (ref 3.2–4.9)
ALBUMIN/GLOB SERPL: 1.6 G/DL
ALP SERPL-CCNC: 138 U/L (ref 30–99)
ALT SERPL-CCNC: 30 U/L (ref 2–50)
ANION GAP SERPL CALC-SCNC: 12 MMOL/L (ref 7–16)
APPEARANCE UR: CLEAR
AST SERPL-CCNC: 37 U/L (ref 12–45)
BASOPHILS # BLD AUTO: 1.1 % (ref 0–1.8)
BASOPHILS # BLD: 0.06 K/UL (ref 0–0.12)
BILIRUB SERPL-MCNC: 0.5 MG/DL (ref 0.1–1.5)
BILIRUB UR QL STRIP.AUTO: NEGATIVE
BUN SERPL-MCNC: 7 MG/DL (ref 8–22)
CALCIUM SERPL-MCNC: 8.9 MG/DL (ref 8.5–10.5)
CHLORIDE SERPL-SCNC: 106 MMOL/L (ref 96–112)
CO2 SERPL-SCNC: 21 MMOL/L (ref 20–33)
COLOR UR: YELLOW
CREAT SERPL-MCNC: 0.6 MG/DL (ref 0.5–1.4)
EOSINOPHIL # BLD AUTO: 0.06 K/UL (ref 0–0.51)
EOSINOPHIL NFR BLD: 1.1 % (ref 0–6.9)
ERYTHROCYTE [DISTWIDTH] IN BLOOD BY AUTOMATED COUNT: 47.8 FL (ref 35.9–50)
GFR SERPLBLD CREATININE-BSD FMLA CKD-EPI: 121 ML/MIN/1.73 M 2
GLOBULIN SER CALC-MCNC: 2.9 G/DL (ref 1.9–3.5)
GLUCOSE SERPL-MCNC: 88 MG/DL (ref 65–99)
GLUCOSE UR STRIP.AUTO-MCNC: NEGATIVE MG/DL
HCT VFR BLD AUTO: 40.4 % (ref 37–47)
HGB BLD-MCNC: 13.2 G/DL (ref 12–16)
IMM GRANULOCYTES # BLD AUTO: 0.01 K/UL (ref 0–0.11)
IMM GRANULOCYTES NFR BLD AUTO: 0.2 % (ref 0–0.9)
KETONES UR STRIP.AUTO-MCNC: NEGATIVE MG/DL
LEUKOCYTE ESTERASE UR QL STRIP.AUTO: NEGATIVE
LIPASE SERPL-CCNC: 31 U/L (ref 11–82)
LYMPHOCYTES # BLD AUTO: 1.6 K/UL (ref 1–4.8)
LYMPHOCYTES NFR BLD: 30.6 % (ref 22–41)
MCH RBC QN AUTO: 32.7 PG (ref 27–33)
MCHC RBC AUTO-ENTMCNC: 32.7 G/DL (ref 33.6–35)
MCV RBC AUTO: 100 FL (ref 81.4–97.8)
MICRO URNS: NORMAL
MONOCYTES # BLD AUTO: 0.47 K/UL (ref 0–0.85)
MONOCYTES NFR BLD AUTO: 9 % (ref 0–13.4)
NEUTROPHILS # BLD AUTO: 3.03 K/UL (ref 2–7.15)
NEUTROPHILS NFR BLD: 58 % (ref 44–72)
NITRITE UR QL STRIP.AUTO: NEGATIVE
NRBC # BLD AUTO: 0 K/UL
NRBC BLD-RTO: 0 /100 WBC
PH UR STRIP.AUTO: 5 [PH] (ref 5–8)
PLATELET # BLD AUTO: 227 K/UL (ref 164–446)
PMV BLD AUTO: 10.6 FL (ref 9–12.9)
POTASSIUM SERPL-SCNC: 3.9 MMOL/L (ref 3.6–5.5)
PROT SERPL-MCNC: 7.5 G/DL (ref 6–8.2)
PROT UR QL STRIP: NEGATIVE MG/DL
RBC # BLD AUTO: 4.04 M/UL (ref 4.2–5.4)
RBC UR QL AUTO: NEGATIVE
SODIUM SERPL-SCNC: 139 MMOL/L (ref 135–145)
SP GR UR STRIP.AUTO: 1
UROBILINOGEN UR STRIP.AUTO-MCNC: 0.2 MG/DL
WBC # BLD AUTO: 5.2 K/UL (ref 4.8–10.8)

## 2022-07-13 PROCEDURE — 99285 EMERGENCY DEPT VISIT HI MDM: CPT

## 2022-07-13 PROCEDURE — 700111 HCHG RX REV CODE 636 W/ 250 OVERRIDE (IP): Performed by: EMERGENCY MEDICINE

## 2022-07-13 PROCEDURE — 80053 COMPREHEN METABOLIC PANEL: CPT

## 2022-07-13 PROCEDURE — 36415 COLL VENOUS BLD VENIPUNCTURE: CPT

## 2022-07-13 PROCEDURE — 96374 THER/PROPH/DIAG INJ IV PUSH: CPT

## 2022-07-13 PROCEDURE — 74176 CT ABD & PELVIS W/O CONTRAST: CPT

## 2022-07-13 PROCEDURE — 51701 INSERT BLADDER CATHETER: CPT

## 2022-07-13 PROCEDURE — 96375 TX/PRO/DX INJ NEW DRUG ADDON: CPT

## 2022-07-13 PROCEDURE — 81003 URINALYSIS AUTO W/O SCOPE: CPT

## 2022-07-13 PROCEDURE — 85025 COMPLETE CBC W/AUTO DIFF WBC: CPT

## 2022-07-13 PROCEDURE — 83690 ASSAY OF LIPASE: CPT

## 2022-07-13 RX ORDER — PROCHLORPERAZINE EDISYLATE 5 MG/ML
10 INJECTION INTRAMUSCULAR; INTRAVENOUS ONCE
Status: COMPLETED | OUTPATIENT
Start: 2022-07-13 | End: 2022-07-13

## 2022-07-13 RX ORDER — ONDANSETRON 4 MG/1
4 TABLET, ORALLY DISINTEGRATING ORAL EVERY 6 HOURS PRN
Qty: 20 TABLET | Refills: 0 | Status: SHIPPED | OUTPATIENT
Start: 2022-07-13 | End: 2023-04-19

## 2022-07-13 RX ORDER — KETOROLAC TROMETHAMINE 30 MG/ML
15 INJECTION, SOLUTION INTRAMUSCULAR; INTRAVENOUS ONCE
Status: COMPLETED | OUTPATIENT
Start: 2022-07-13 | End: 2022-07-13

## 2022-07-13 RX ORDER — DIPHENHYDRAMINE HYDROCHLORIDE 50 MG/ML
25 INJECTION INTRAMUSCULAR; INTRAVENOUS ONCE
Status: COMPLETED | OUTPATIENT
Start: 2022-07-13 | End: 2022-07-13

## 2022-07-13 RX ORDER — ONDANSETRON 2 MG/ML
4 INJECTION INTRAMUSCULAR; INTRAVENOUS ONCE
Status: COMPLETED | OUTPATIENT
Start: 2022-07-13 | End: 2022-07-13

## 2022-07-13 RX ORDER — HYDROMORPHONE HYDROCHLORIDE 1 MG/ML
0.5 INJECTION, SOLUTION INTRAMUSCULAR; INTRAVENOUS; SUBCUTANEOUS ONCE
Status: COMPLETED | OUTPATIENT
Start: 2022-07-13 | End: 2022-07-13

## 2022-07-13 RX ADMIN — KETOROLAC TROMETHAMINE 15 MG: 30 INJECTION, SOLUTION INTRAMUSCULAR; INTRAVENOUS at 15:32

## 2022-07-13 RX ADMIN — DIPHENHYDRAMINE HYDROCHLORIDE 25 MG: 50 INJECTION INTRAMUSCULAR; INTRAVENOUS at 17:12

## 2022-07-13 RX ADMIN — PROCHLORPERAZINE EDISYLATE 10 MG: 5 INJECTION INTRAMUSCULAR; INTRAVENOUS at 17:12

## 2022-07-13 RX ADMIN — HYDROMORPHONE HYDROCHLORIDE 0.5 MG: 1 INJECTION, SOLUTION INTRAMUSCULAR; INTRAVENOUS; SUBCUTANEOUS at 16:22

## 2022-07-13 RX ADMIN — ONDANSETRON 4 MG: 2 INJECTION INTRAMUSCULAR; INTRAVENOUS at 15:33

## 2022-07-13 ASSESSMENT — FIBROSIS 4 INDEX: FIB4 SCORE: 0.85

## 2022-07-13 NOTE — ED TRIAGE NOTES
"Chief Complaint   Patient presents with   • Abdominal Pain     Patient reports L side abdominal pain radiating to her back x1 week   • N/V     Patient reports vomiting since yesterday       32 yo female to triage for above complaint. Patient reports pain worsening since yesterday with N/V.  Protocol ordered.    Pt is alert and oriented, speaking in full sentences, follows commands and responds appropriately to questions.     Patient placed back in lobby and educated on triage process. Asked to inform RN of any changes.    /72   Pulse 66   Temp 36.8 °C (98.3 °F) (Temporal)   Resp 18   Ht 1.6 m (5' 3\")   Wt 104 kg (228 lb 9.9 oz)   LMP 09/20/2018 (Approximate)   SpO2 100%   BMI 40.50 kg/m²     "

## 2022-07-13 NOTE — DISCHARGE INSTRUCTIONS
If you continue to have any significant abdominal pain you need to be seen again by your primary doctor or in the emergency department one day as you may be early in the disease process and problems can often take a few days to develop. Seek more immediate medical attention if you develop fever, uncontrolled vomiting, new or worsening of your pain or any other concerns.

## 2022-07-13 NOTE — Clinical Note
Abril Vance was seen and treated in our emergency department on 7/13/2022.  She may return to work on 07/15/2022.       If you have any questions or concerns, please don't hesitate to call.      Jose Ortiz M.D.

## 2022-07-13 NOTE — ED PROVIDER NOTES
ED Provider Note    ER PROVIDER NOTE          CHIEF COMPLAINT  Chief Complaint   Patient presents with   • Abdominal Pain     Patient reports L side abdominal pain radiating to her back x1 week   • N/V     Patient reports vomiting since yesterday       HPI  Abril Vance is a 31 y.o. female who presents to the emergency department complaining of abdominal pain, nausea and vomiting.  Patient reports that her symptoms began around 1 week ago, some mild pain to the left side of her abdomen, and has gradually worsened through the week.  It does not radiate towards her flank on that side and is fairly constant without any real alleviating or aggravating factors.  She also reports yesterday she began vomiting and has had several episodes of nonbloody emesis.  She did have some diarrhea for the first 2 days of the pain but this has resolved, no blood in her stool or dark tarry stool.  She reports no fevers or chills.  No dysuria or hematuria but has been having some urinary frequency.  No recent illness cough, congestion, chest pain or shortness of breath.    History of hysterectomy, cholecystectomy, gastric sleeve    REVIEW OF SYSTEMS  Pertinent positives include abdominal pain, vomiting. Pertinent negatives include no fever. See HPI for details. All other systems reviewed and are negative.    PAST MEDICAL HISTORY   has a past medical history of Arthritis, Gallstones, History of anemia, Migraines (4/5/2010), Morbid obesity (HCC) (4/5/2010), Sleep apnea, Snoring, and Vaginal trichomoniasis (4/5/2010).    SURGICAL HISTORY   has a past surgical history that includes lauren by laparoscopy (10/14/2010); other (2016); vaginal hysterectomy scope total (9/25/2018); salpingectomy (Bilateral, 9/25/2018); anterior and posterior repair (9/25/2018); enterocele repair (9/25/2018); bladder sling female (9/25/2018); and vaginal suspension (9/25/2018).    FAMILY HISTORY  Family History   Family history unknown: Yes       SOCIAL  "HISTORY  Social History     Socioeconomic History   • Marital status: Legally    Tobacco Use   • Smoking status: Never Smoker   • Smokeless tobacco: Never Used   Vaping Use   • Vaping Use: Never used   Substance and Sexual Activity   • Alcohol use: Yes   • Drug use: No   Social History Narrative    ** Merged History Encounter **           Social History     Substance and Sexual Activity   Drug Use No       CURRENT MEDICATIONS  Home Medications     Reviewed by Kelly Mejia R.N. (Registered Nurse) on 07/13/22 at 1354  Med List Status: Partial   Medication Last Dose Status   acetaminophen (TYLENOL) 325 MG Tab  Active   IRON PO  Active   metoclopramide (REGLAN) 10 MG Tab  Active   ondansetron (ZOFRAN ODT) 4 MG TABLET DISPERSIBLE  Active   phentermine (ADIPEX-P) 37.5 MG tablet  Active                ALLERGIES  Allergies   Allergen Reactions   • Nkda [No Known Drug Allergy]        PHYSICAL EXAM  VITAL SIGNS: /72   Pulse 66   Temp 36.8 °C (98.3 °F) (Temporal)   Resp 18   Ht 1.6 m (5' 3\")   Wt 104 kg (228 lb 9.9 oz)   LMP 09/20/2018 (Approximate)   SpO2 100%   BMI 40.50 kg/m²   Pulse ox interpretation: I interpret this pulse ox as normal.    Constitutional: Alert uncomfortable.  HENT: No signs of trauma, Bilateral external ears normal, Nose normal.   Eyes: Pupils are equal and reactive, Conjunctiva normal, Non-icteric.   Neck: Normal range of motion, No tenderness, Supple, No stridor.   Cardiovascular: Regular rate and rhythm, no murmurs.   Thorax & Lungs: Normal breath sounds, No respiratory distress, No wheezing, No chest tenderness.   Abdomen: Bowel sounds normal, Soft, left upper quadrant tenderness, No masses, No pulsatile masses. No peritoneal signs.  Skin: Warm, Dry, No erythema, No rash.   Back: No bony tenderness, L CVA tenderness.   Extremities: Intact distal pulses, No edema, No tenderness, No cyanosis,  Musculoskeletal: Good range of motion in all major joints. No tenderness to " palpation or major deformities noted.   Neurologic: Alert , Normal motor function, Normal sensory function, No focal deficits noted.   Psychiatric: Affect normal, Judgment normal, Mood normal.     DIAGNOSTIC STUDIES / PROCEDURES      LABS  Labs Reviewed   CBC WITH DIFFERENTIAL - Abnormal; Notable for the following components:       Result Value    RBC 4.04 (*)     .0 (*)     MCHC 32.7 (*)     All other components within normal limits   COMP METABOLIC PANEL - Abnormal; Notable for the following components:    Bun 7 (*)     Alkaline Phosphatase 138 (*)     All other components within normal limits   LIPASE   URINALYSIS   ESTIMATED GFR       All labs reviewed by me.    RADIOLOGY  CT-RENAL COLIC EVALUATION(A/P W/O)   Final Result      1.  No acute abnormality within the abdomen or pelvis. Specifically, no renal or ureteral calculi      2.  Postoperative changes of the stomach and there has been hysterectomy        The radiologist's interpretation of all radiological studies have been reviewed and images independently viewed by me.    COURSE & MEDICAL DECISION MAKING  Nursing notes, VS, PMSFHx reviewed in chart.    3:10 PM Patient seen and examined at bedside. Patient will be treated with Toradol, IV fluid, Zofran. Ordered for CBC, CMP, urinalysis, lipase, CT to evaluate her symptoms.     3:53 PM  Patient is reevaluated, pain is returning, additional medication ordered, pending CT results    4:17 PM  Patient is reevaluated and updated on all results, symptoms are improving, will plan for discharge    5:05 PM  Prior to discharge, patient developed some return of cramping and nausea, additional medication is ordered    Symptoms have improved after additional medication and patient would like to go home now    HYDRATION: Based on the patient's presentation of Acute Vomiting the patient was given IV fluids. IV Hydration was used because oral hydration was not as rapid as required. Upon recheck following hydration, the  patient was improved.    Decision Making:  This is a 31 y.o. female presented with abdominal pain as well as nausea and vomiting.  At this point she has an unremarkable work-up without evidence of acute or emergent pathology.  CT shows no evidence of obstruction, perforation or other surgical pathology no findings of ureteral obstruction either or ureteral stones.  Lipase is normal, no leukocytosis.  Urinalysis is negative.  Pain is primarily upper and towards her flank without suggestion of pelvic pathology at this time.  Her symptoms have improved here, will give a prescription for Zofran, discussed return precautions including return for recheck in 24 hours if still has continued pain    The patient will return for new or worsening symptoms and is stable at the time of discharge.    The patient is referred to a primary physician for blood pressure management, diabetic screening, and for all other preventative health concerns.        DISPOSITION:  Patient will be discharged home in stable condition.    FOLLOW UP:  Marleny Faustin M.D.  [address] [facility] [address]  [phone]    On 7/15/2022      [facility], Emergency Dept  [address] [place] [id number] [phone]    If you continue to have any pain in 1 day      OUTPATIENT MEDICATIONS:  New Prescriptions    ONDANSETRON (ZOFRAN ODT) 4 MG TABLET DISPERSIBLE    Take 1 Tablet by mouth every 6 hours as needed for Nausea.         FINAL IMPRESSION  1. Nausea and vomiting, intractability of vomiting not specified, unspecified vomiting type    2. Left upper quadrant abdominal pain         The note accurately reflects work and decisions made by me.  Jose Ortiz M.D.  7/13/2022  6:30 PM

## 2022-07-14 NOTE — ED NOTES
Pt stating she is feeling better and feels comfortable to go home at this time. Pt discharged home as ordered by erp. Pt instructed to follow up with her PCP and return here as needed. Pt instructed on where to  RXs and educated no driving/drinking on pain meds. Pt left ambulating independently and her family is to  her home

## 2022-09-22 ENCOUNTER — APPOINTMENT (OUTPATIENT)
Dept: RADIOLOGY | Facility: MEDICAL CENTER | Age: 32
End: 2022-09-22
Attending: STUDENT IN AN ORGANIZED HEALTH CARE EDUCATION/TRAINING PROGRAM
Payer: COMMERCIAL

## 2022-09-22 ENCOUNTER — HOSPITAL ENCOUNTER (EMERGENCY)
Facility: MEDICAL CENTER | Age: 32
End: 2022-09-23
Attending: STUDENT IN AN ORGANIZED HEALTH CARE EDUCATION/TRAINING PROGRAM
Payer: COMMERCIAL

## 2022-09-22 VITALS
RESPIRATION RATE: 19 BRPM | DIASTOLIC BLOOD PRESSURE: 68 MMHG | WEIGHT: 232.59 LBS | SYSTOLIC BLOOD PRESSURE: 130 MMHG | HEIGHT: 63 IN | TEMPERATURE: 98.5 F | OXYGEN SATURATION: 99 % | BODY MASS INDEX: 41.21 KG/M2 | HEART RATE: 63 BPM

## 2022-09-22 DIAGNOSIS — S83.92XA SPRAIN OF LEFT KNEE, UNSPECIFIED LIGAMENT, INITIAL ENCOUNTER: ICD-10-CM

## 2022-09-22 DIAGNOSIS — S93.402A SPRAIN OF LEFT ANKLE, UNSPECIFIED LIGAMENT, INITIAL ENCOUNTER: ICD-10-CM

## 2022-09-22 DIAGNOSIS — S86.812A STRAIN OF CALF MUSCLE, LEFT, INITIAL ENCOUNTER: ICD-10-CM

## 2022-09-22 PROCEDURE — A9270 NON-COVERED ITEM OR SERVICE: HCPCS | Performed by: STUDENT IN AN ORGANIZED HEALTH CARE EDUCATION/TRAINING PROGRAM

## 2022-09-22 PROCEDURE — 73610 X-RAY EXAM OF ANKLE: CPT | Mod: LT

## 2022-09-22 PROCEDURE — 700102 HCHG RX REV CODE 250 W/ 637 OVERRIDE(OP): Performed by: STUDENT IN AN ORGANIZED HEALTH CARE EDUCATION/TRAINING PROGRAM

## 2022-09-22 PROCEDURE — 99284 EMERGENCY DEPT VISIT MOD MDM: CPT

## 2022-09-22 PROCEDURE — 73560 X-RAY EXAM OF KNEE 1 OR 2: CPT | Mod: LT

## 2022-09-22 RX ORDER — METHOCARBAMOL 500 MG/1
500 TABLET, FILM COATED ORAL 3 TIMES DAILY
Qty: 30 TABLET | Refills: 0 | Status: SHIPPED | OUTPATIENT
Start: 2022-09-22 | End: 2023-04-19

## 2022-09-22 RX ORDER — LIDOCAINE 50 MG/G
1 PATCH TOPICAL EVERY 24 HOURS
Qty: 10 PATCH | Refills: 0 | Status: SHIPPED | OUTPATIENT
Start: 2022-09-22 | End: 2023-04-19

## 2022-09-22 RX ORDER — ACETAMINOPHEN 500 MG
500-1000 TABLET ORAL EVERY 6 HOURS PRN
Qty: 30 TABLET | Refills: 0 | Status: SHIPPED | OUTPATIENT
Start: 2022-09-22 | End: 2024-02-06

## 2022-09-22 RX ORDER — OXYCODONE HYDROCHLORIDE AND ACETAMINOPHEN 5; 325 MG/1; MG/1
1 TABLET ORAL ONCE
Status: COMPLETED | OUTPATIENT
Start: 2022-09-22 | End: 2022-09-22

## 2022-09-22 RX ORDER — IBUPROFEN 600 MG/1
600 TABLET ORAL EVERY 6 HOURS PRN
Qty: 30 TABLET | Refills: 0 | Status: SHIPPED | OUTPATIENT
Start: 2022-09-22 | End: 2023-04-19

## 2022-09-22 RX ADMIN — OXYCODONE AND ACETAMINOPHEN 1 TABLET: 5; 325 TABLET ORAL at 22:28

## 2022-09-22 ASSESSMENT — FIBROSIS 4 INDEX: FIB4 SCORE: 1.01

## 2022-09-23 NOTE — ED NOTES
Pt signed and given d/c papers. Discussed rx sent to preferred pharmacy and f/u w/ ortho. Pt denies questions/concerns. Verbalizes comfort w/ ankle air splint and crutches. Pt ambulated out of the dept accompanied by friend A&O x4 w/ all belongings

## 2022-09-23 NOTE — DISCHARGE INSTRUCTIONS
If your pain continues follow-up with orthopedist.  Tylenol ibuprofen scheduled as needed for pain.  Robaxin   As needed for muscle pain.  Return with other concerns.

## 2022-09-23 NOTE — ED TRIAGE NOTES
"Chief Complaint   Patient presents with    Leg Pain     Arrives with L leg pain  Pt fell Sunday, tripped over a brick and landed on left leg   L knee and ankle pain worsening, bruise to L shin and small scrape to L knee, no obvious deformity  Pain is constant, worse with ambulation, pain travels from L knee down to ankle     /72   Pulse 60   Temp 36.8 °C (98.3 °F) (Temporal)   Resp 16   Ht 1.6 m (5' 3\")   Wt 105 kg (232 lb 9.4 oz)   LMP 09/20/2018 (Approximate)   SpO2 100%   BMI 41.20 kg/m²     Pt made aware of triage process, placed back into lobby, educated pt to tell staff of any worsening of symptoms     "

## 2022-09-23 NOTE — ED PROVIDER NOTES
CHIEF COMPLAINT  Chief Complaint   Patient presents with    Leg Pain     Arrives with L leg pain  Pt fell Sunday, tripped over a brick and landed on left leg   L knee and ankle pain worsening, bruise to L shin and small scrape to L knee, no obvious deformity  Pain is constant, worse with ambulation, pain travels from L knee down to ankle       HPI  Abril Vance is a 32 y.o. female who presents evaluation of left knee left calf and left ankle pain after mechanical fall on Sunday.  Patient states that she rolled her ankle, landing on her left knee.  It woke up the next day with the left ankle pain left calf pain and left knee pain.  She has been ambulatory on it since that time though states it is painful.  Is described as a throbbing sensation moderate in severity, travels from her knee down to her ankle no other bleeding or exacerbating factors.    REVIEW OF SYSTEMS  See HPI for further details. All other systems are negative.     PAST MEDICAL HISTORY   has a past medical history of Arthritis, Gallstones, History of anemia, Migraines (4/5/2010), Morbid obesity (HCC) (4/5/2010), Sleep apnea, Snoring, and Vaginal trichomoniasis (4/5/2010).    SOCIAL HISTORY  Social History     Tobacco Use    Smoking status: Never    Smokeless tobacco: Never   Vaping Use    Vaping Use: Never used   Substance and Sexual Activity    Alcohol use: Yes    Drug use: No    Sexual activity: Not on file       SURGICAL HISTORY   has a past surgical history that includes lauren by laparoscopy (10/14/2010); other (2016); vaginal hysterectomy scope total (9/25/2018); salpingectomy (Bilateral, 9/25/2018); anterior and posterior repair (9/25/2018); enterocele repair (9/25/2018); bladder sling female (9/25/2018); and vaginal suspension (9/25/2018).    CURRENT MEDICATIONS  Home Medications       Reviewed by Yoni Pedro R.N. (Registered Nurse) on 09/22/22 at 0889  Med List Status: Not Addressed     Medication Last Dose Status   acetaminophen  "(TYLENOL) 325 MG Tab  Active   IRON PO  Active   metoclopramide (REGLAN) 10 MG Tab  Active   ondansetron (ZOFRAN ODT) 4 MG TABLET DISPERSIBLE  Active   ondansetron (ZOFRAN ODT) 4 MG TABLET DISPERSIBLE  Active   phentermine (ADIPEX-P) 37.5 MG tablet  Active                    ALLERGIES  Allergies   Allergen Reactions    Nkda [No Known Drug Allergy]        PHYSICAL EXAM  VITAL SIGNS: /72   Pulse 60   Temp 36.8 °C (98.3 °F) (Temporal)   Resp 16   Ht 1.6 m (5' 3\")   Wt 105 kg (232 lb 9.4 oz)   LMP 09/20/2018 (Approximate)   SpO2 100%   BMI 41.20 kg/m²  @SHANKAR[[id number]::@  Pulse ox interpretation:   I interpret this pulse ox as normal.  VITALS - vital signs documented prior to this note have been reviewed and noted,  see EHR  GENERAL - awake and alert, no acute distress  HEENT - normocephalic, atraumatic, moist mucus membranes  CARDIOVASCULAR - regular rate and rhythm  PULMONARY - unlabored, no respiratory distress. No audible wheezing or  stridor.  GASTROINTESTINAL - non-tender, non-distended  NEUROLOGIC - mental status normal, speech fluid, cognition normal  MUSCULOSKELETAL -no obvious deformity or swelling  Extremities she has tenderness with palpation of the left anterior talofibular ligament distribution, no fifth metatarsal tenderness on the left.  Negative Alves squeeze test, no specific bony tenderness of the tibia or fibula.  Mild tenderness with palpation of the patient's patella negative anterior posterior drawer test.  Mild diffuse calf tenderness.  Compartments are soft 2+ DP PT pulses  DERMATOLOGIC - warm and dry, no visible rashes  PSYCHIATRIC - normal affect, normal concentration          COURSE/PROCEDURES      Labs Reviewed - No data to display    DX-ANKLE 3+ VIEWS LEFT   Final Result      No radiographic evidence of acute traumatic injury.      DX-KNEE 2- LEFT   Final Result         1.  No radiographic evidence of acute injury.               MEDICAL DECISION MAKING    Patient presented for " evaluation of left ankle pain and left knee pain.  Differential included fracture sprain strain dislocation contusion among many other considerations.  No signs of neurovascular injury or compartment syndrome.  No signs of a Achilles tendon disruption.  X-rays were obtained were negative for fracture.  History and physical exam seems consistent with ankle sprain, patellar contusion, as well as calf strain.  Will treat symptomatically recommend orthopedic follow-up if symptoms persist after trial of symptomatic care.  She is given a stirrup splint provided with crutches.  All pertinent return precautions were discussed with the patient, and they expressed understanding.  Patient was discharged in a stable condition            FINAL IMPRESSION  1.  Ankle sprain  2.  Patellar contusion  3.  Calf strain       Dictation Disclaimer  Please note this report has been produced using speech recognition software and  may contain errors related to that system, including errors seen in grammar,  punctuation and spelling, as well as words and phrases that may be inappropriate.  If there are any questions or concerns, please feel free to contact the dictating  physician for clarification.        Electronically signed by: Yvon Sevilla D.O., 9/22/2022 11:10 PM

## 2022-09-28 ENCOUNTER — HOSPITAL ENCOUNTER (OUTPATIENT)
Dept: RADIOLOGY | Facility: MEDICAL CENTER | Age: 32
End: 2022-09-28
Attending: NURSE PRACTITIONER
Payer: COMMERCIAL

## 2022-09-28 DIAGNOSIS — M79.672 LEFT FOOT PAIN: ICD-10-CM

## 2022-09-28 DIAGNOSIS — M25.572 ACUTE LEFT ANKLE PAIN: ICD-10-CM

## 2022-09-28 PROCEDURE — 73700 CT LOWER EXTREMITY W/O DYE: CPT | Mod: LT

## 2022-09-29 ENCOUNTER — APPOINTMENT (OUTPATIENT)
Dept: RADIOLOGY | Facility: MEDICAL CENTER | Age: 32
End: 2022-09-29
Attending: NURSE PRACTITIONER
Payer: COMMERCIAL

## 2022-09-29 DIAGNOSIS — M25.572 ACUTE LEFT ANKLE PAIN: ICD-10-CM

## 2022-09-29 DIAGNOSIS — M25.562 ACUTE PAIN OF LEFT KNEE: ICD-10-CM

## 2022-09-29 DIAGNOSIS — M79.672 LEFT FOOT PAIN: ICD-10-CM

## 2022-09-29 PROCEDURE — 73721 MRI JNT OF LWR EXTRE W/O DYE: CPT | Mod: LT

## 2022-11-28 ENCOUNTER — HOSPITAL ENCOUNTER (OUTPATIENT)
Dept: RADIOLOGY | Facility: MEDICAL CENTER | Age: 32
End: 2022-11-28
Attending: NURSE PRACTITIONER
Payer: COMMERCIAL

## 2022-11-28 DIAGNOSIS — M25.372 ANKLE INSTABILITY, LEFT: ICD-10-CM

## 2022-11-28 DIAGNOSIS — M25.572 ACUTE LEFT ANKLE PAIN: ICD-10-CM

## 2022-11-28 PROCEDURE — 73721 MRI JNT OF LWR EXTRE W/O DYE: CPT

## 2022-12-15 PROBLEM — M25.372 ANKLE INSTABILITY, LEFT: Status: ACTIVE | Noted: 2022-12-15

## 2022-12-15 NOTE — H&P (VIEW-ONLY)
Subjective   Patient ID:  Abril Vance is a 32 y.o. female.    Chief Complaint:    Chief Complaint   Patient presents with    Left Foot - Follow-Up    Follow-Up of the Left Foot    Last Surgery: No surgery found on No surgery found      HPI  Abril Vance returns to clinic for an acute complicated injury to her left foot after she took a ground-level fall while walking on uneven bricks in her patio about 3 months ago. Initially she presented to the ER and subsequently followed up at [facility]. She initially was placed in a tall fracture boot and has now transitioned into an ankle brace. Last visit she received an injection to her os trigonum which afforded him some relief however did not resolve all of her pain. Unfortunately she has not been able to return to baseline and does not feel that she is improving. She does have feelings of instability. She denies any numbness or tingling. She denies any fever or chills.     ROS  Constitutional: Negative for fever, chills/sweats   Respiratory: Negative for shortness of breath, DOTY  Cardiovascular: Negative for chest pain or pressure  GI/: Negative for diarrhea/constipation / urinary difficulty  All other systems reviewed and are negative except as per HPI.     Objective   Ortho Exam  She is grossly unstable with both anterior drawer and talar tilt. She has tenderness along the lateral ligaments, she also has tenderness along the peroneal tendons without significant swelling or obvious dislocation. She has pain to her ATFL and CFL. She has some tenderness along both the  posterior and anterior ankle joint, as well as tenderness to anterior process of the calcaneus. She has generalized soft tissue swelling to her left ankle. Some pain over her os trigonum. Grossly neurovascularly intact, distally.     Last Imaging Result(s):   MRI images and radiology report of the left ankle, done on 11/29/2022 at [facility], were reviewed and demonstrates edema to the  medial ankle and along the posterior tibialis tendon. She has some diffuse soft tissue swelling. Evidence for posterior impingement. Deltoid may have partial tear. She has an os trigonum.    CT scan and radiology report, taken at [facility] on September 28, 2022, was reviewed demonstrating an essentially unremarkable CT.    Assessment & Plan   Encounter Diagnoses:   Persistent right ankle pain following an ankle sprain injury.  Right ankle functional and mechanical instability.  Os trigonum of right foot  Right ankle impingement syndrome    I had a thorough discussion with the patient regarding the diagnosis including both operative and nonoperative treatment.  After obtaining consent from the patient, we will proceed with operative management. I recommended a right anterior/posterior ankle arthroscopy, evaluation and treat peroneal tendons, lateral ligament reconstruction, evaluate bifurcate ligament, repairs as indicated. Risks of surgery include, but not limited to, wound problems, infection, nerve injury, vascular injury, need for further surgery. There is also risk for weakness, stiffness, blood clots, recurrence of any deformity and chance for reinjury. I discussed the risks/ benefits/ complications associated with narcotic use including the potential for addiction. All of the patient's questions were answered today. We will schedule this in the near future at her convenience. I will follow up with her postoperatively.    Orders Placed This Encounter    Surgical Case Request: RECONSTRUCTION, LIGAMENT, ANKLE, SYNOVECTOMY, EXCISION,CALCANEUS,FOR OSTEOMYELITIS, REPAIR, TENDON, LOWER EXTREMITY, USING TENDON GRAFT     Procedures   No follow-ups on file.    I, Daja Bonilla, am scribing for, and in the presence of Matti Carlos MD.    I, Matti Carlos MD, personally performed the services described in this documentation, as scribed by, Daja Bonilla, in my presence. I do hereby attest this information is true, accurate, and  complete to the best of my knowledge.

## 2022-12-22 ENCOUNTER — PRE-ADMISSION TESTING (OUTPATIENT)
Dept: ADMISSIONS | Facility: MEDICAL CENTER | Age: 32
End: 2022-12-22
Attending: ORTHOPAEDIC SURGERY
Payer: COMMERCIAL

## 2022-12-22 NOTE — DISCHARGE PLANNING
DISCHARGE PLANNING NOTE     Procedure: Procedure(s):  LEFT ANTERIOR/POSTERIOR ANKLE ARTHROSCOPY, EVALUATE PERONEAL TENDONS, LATERAL LIGAMENT RECONSTRUCTION, EVALUATE BIFURCATE LIGAMENT, REPAIRS AS INDICATED  LEFT ANTERIOR/POSTERIOR ANKLE ARTHROSCOPY, EVALUATE PERONEAL TENDONS, LATERAL LIGAMENT RECONSTRUCTION, EVALUATE BIFURCATE LIGAMENT, REPAIRS AS INDICATED  REPAIR, TENDON, LOWER EXTREMITY, USING TENDON GRAFT  Procedure Date: 1/2/2023  Insurance: Payor: [employer] / Plan: [employer] / Product Type: *No Product type* /    Equipment currently available at home?  crutches  Steps into the home? 3  Steps within the home? 0  Toilet height? Standard  Type of shower? tub-shower    Plan:     Spoke with pt over the phone during preadmission appointment. Pt does not know if she will be NWB. Home check list and equipment list reviewed and emailed to patient. Recommended knee scooter and tub transfer bench. All questions and concerns addressed. Anticipate discharge home.

## 2023-01-02 ENCOUNTER — ANESTHESIA (OUTPATIENT)
Dept: SURGERY | Facility: MEDICAL CENTER | Age: 33
End: 2023-01-02
Payer: COMMERCIAL

## 2023-01-02 ENCOUNTER — HOSPITAL ENCOUNTER (OUTPATIENT)
Facility: MEDICAL CENTER | Age: 33
End: 2023-01-02
Attending: ORTHOPAEDIC SURGERY | Admitting: ORTHOPAEDIC SURGERY
Payer: COMMERCIAL

## 2023-01-02 ENCOUNTER — ANESTHESIA EVENT (OUTPATIENT)
Dept: SURGERY | Facility: MEDICAL CENTER | Age: 33
End: 2023-01-02
Payer: COMMERCIAL

## 2023-01-02 VITALS
OXYGEN SATURATION: 95 % | DIASTOLIC BLOOD PRESSURE: 61 MMHG | RESPIRATION RATE: 17 BRPM | TEMPERATURE: 96.7 F | HEART RATE: 63 BPM | BODY MASS INDEX: 41.17 KG/M2 | SYSTOLIC BLOOD PRESSURE: 113 MMHG | HEIGHT: 63 IN | WEIGHT: 232.37 LBS

## 2023-01-02 DIAGNOSIS — M25.372 ANKLE INSTABILITY, LEFT: ICD-10-CM

## 2023-01-02 PROCEDURE — 29891 ARTHR ANK OSTCHN DF TAL&/TIB: CPT | Mod: LT | Performed by: ORTHOPAEDIC SURGERY

## 2023-01-02 PROCEDURE — 28020 EXPLORATION OF FOOT JOINT: CPT | Mod: 59 | Performed by: ORTHOPAEDIC SURGERY

## 2023-01-02 PROCEDURE — 29891 ARTHR ANK OSTCHN DF TAL&/TIB: CPT | Mod: 80ROC,LT | Performed by: GENERAL PRACTICE

## 2023-01-02 PROCEDURE — 27658 REPAIR OF LEG TENDON EACH: CPT | Mod: 80ROC,59 | Performed by: GENERAL PRACTICE

## 2023-01-02 PROCEDURE — 700101 HCHG RX REV CODE 250: Performed by: INTERNAL MEDICINE

## 2023-01-02 PROCEDURE — 64445 NJX AA&/STRD SCIATIC NRV IMG: CPT | Performed by: ORTHOPAEDIC SURGERY

## 2023-01-02 PROCEDURE — 29898 ANKLE ARTHROSCOPY/SURGERY: CPT | Mod: LT | Performed by: ORTHOPAEDIC SURGERY

## 2023-01-02 PROCEDURE — 160025 RECOVERY II MINUTES (STATS): Performed by: ORTHOPAEDIC SURGERY

## 2023-01-02 PROCEDURE — 700105 HCHG RX REV CODE 258: Performed by: INTERNAL MEDICINE

## 2023-01-02 PROCEDURE — 160009 HCHG ANES TIME/MIN: Performed by: ORTHOPAEDIC SURGERY

## 2023-01-02 PROCEDURE — 28086 EXCISE FOOT TENDON SHEATH: CPT | Mod: 80ROC,59,LT | Performed by: GENERAL PRACTICE

## 2023-01-02 PROCEDURE — 700101 HCHG RX REV CODE 250: Performed by: ORTHOPAEDIC SURGERY

## 2023-01-02 PROCEDURE — 160048 HCHG OR STATISTICAL LEVEL 1-5: Performed by: ORTHOPAEDIC SURGERY

## 2023-01-02 PROCEDURE — 160035 HCHG PACU - 1ST 60 MINS PHASE I: Performed by: ORTHOPAEDIC SURGERY

## 2023-01-02 PROCEDURE — 700111 HCHG RX REV CODE 636 W/ 250 OVERRIDE (IP): Performed by: INTERNAL MEDICINE

## 2023-01-02 PROCEDURE — 29906 SUBTALAR ARTHRO W/DEB: CPT | Mod: LT | Performed by: ORTHOPAEDIC SURGERY

## 2023-01-02 PROCEDURE — 28086 EXCISE FOOT TENDON SHEATH: CPT | Mod: LT | Performed by: ORTHOPAEDIC SURGERY

## 2023-01-02 PROCEDURE — 27698 REPAIR OF ANKLE LIGAMENT: CPT | Mod: LT | Performed by: ORTHOPAEDIC SURGERY

## 2023-01-02 PROCEDURE — 8968 PR NO CHARGE - PROCEDURE: Mod: 80ROC | Performed by: GENERAL PRACTICE

## 2023-01-02 PROCEDURE — C1713 ANCHOR/SCREW BN/BN,TIS/BN: HCPCS | Performed by: ORTHOPAEDIC SURGERY

## 2023-01-02 PROCEDURE — 29898 ANKLE ARTHROSCOPY/SURGERY: CPT | Mod: 80ROC,LT | Performed by: GENERAL PRACTICE

## 2023-01-02 PROCEDURE — 700102 HCHG RX REV CODE 250 W/ 637 OVERRIDE(OP): Performed by: INTERNAL MEDICINE

## 2023-01-02 PROCEDURE — 64445 NJX AA&/STRD SCIATIC NRV IMG: CPT | Mod: 59,LT | Performed by: INTERNAL MEDICINE

## 2023-01-02 PROCEDURE — 160029 HCHG SURGERY MINUTES - 1ST 30 MINS LEVEL 4: Performed by: ORTHOPAEDIC SURGERY

## 2023-01-02 PROCEDURE — 27698 REPAIR OF ANKLE LIGAMENT: CPT | Mod: 80ROC,LT | Performed by: GENERAL PRACTICE

## 2023-01-02 PROCEDURE — 700105 HCHG RX REV CODE 258: Performed by: ORTHOPAEDIC SURGERY

## 2023-01-02 PROCEDURE — 160036 HCHG PACU - EA ADDL 30 MINS PHASE I: Performed by: ORTHOPAEDIC SURGERY

## 2023-01-02 PROCEDURE — 160002 HCHG RECOVERY MINUTES (STAT): Performed by: ORTHOPAEDIC SURGERY

## 2023-01-02 PROCEDURE — 01470 ANES PX NRV MSC LW L/A/F NOS: CPT | Performed by: INTERNAL MEDICINE

## 2023-01-02 PROCEDURE — 700111 HCHG RX REV CODE 636 W/ 250 OVERRIDE (IP): Performed by: ORTHOPAEDIC SURGERY

## 2023-01-02 PROCEDURE — 28120 PART REMOVAL OF ANKLE/HEEL: CPT | Mod: LT,59 | Performed by: ORTHOPAEDIC SURGERY

## 2023-01-02 PROCEDURE — 27658 REPAIR OF LEG TENDON EACH: CPT | Mod: 59 | Performed by: ORTHOPAEDIC SURGERY

## 2023-01-02 PROCEDURE — A9270 NON-COVERED ITEM OR SERVICE: HCPCS | Performed by: INTERNAL MEDICINE

## 2023-01-02 PROCEDURE — 29906 SUBTALAR ARTHRO W/DEB: CPT | Mod: 80ROC,LT | Performed by: GENERAL PRACTICE

## 2023-01-02 PROCEDURE — 160041 HCHG SURGERY MINUTES - EA ADDL 1 MIN LEVEL 4: Performed by: ORTHOPAEDIC SURGERY

## 2023-01-02 PROCEDURE — 160046 HCHG PACU - 1ST 60 MINS PHASE II: Performed by: ORTHOPAEDIC SURGERY

## 2023-01-02 DEVICE — SUTURE ANCHOR TWINFIX 3.5 WITH NEEDLES SMALL JOINT: Type: IMPLANTABLE DEVICE | Site: ANKLE | Status: FUNCTIONAL

## 2023-01-02 RX ORDER — LIDOCAINE HYDROCHLORIDE 40 MG/ML
SOLUTION TOPICAL PRN
Status: DISCONTINUED | OUTPATIENT
Start: 2023-01-02 | End: 2023-01-02 | Stop reason: SURG

## 2023-01-02 RX ORDER — ROPIVACAINE HYDROCHLORIDE 5 MG/ML
INJECTION, SOLUTION EPIDURAL; INFILTRATION; PERINEURAL
Status: COMPLETED | OUTPATIENT
Start: 2023-01-02 | End: 2023-01-02

## 2023-01-02 RX ORDER — OXYCODONE HCL 5 MG/5 ML
10 SOLUTION, ORAL ORAL
Status: COMPLETED | OUTPATIENT
Start: 2023-01-02 | End: 2023-01-02

## 2023-01-02 RX ORDER — ROCURONIUM BROMIDE 10 MG/ML
INJECTION, SOLUTION INTRAVENOUS PRN
Status: DISCONTINUED | OUTPATIENT
Start: 2023-01-02 | End: 2023-01-02 | Stop reason: SURG

## 2023-01-02 RX ORDER — ONDANSETRON 2 MG/ML
INJECTION INTRAMUSCULAR; INTRAVENOUS PRN
Status: DISCONTINUED | OUTPATIENT
Start: 2023-01-02 | End: 2023-01-02 | Stop reason: SURG

## 2023-01-02 RX ORDER — SODIUM CHLORIDE, SODIUM LACTATE, POTASSIUM CHLORIDE, CALCIUM CHLORIDE 600; 310; 30; 20 MG/100ML; MG/100ML; MG/100ML; MG/100ML
INJECTION, SOLUTION INTRAVENOUS CONTINUOUS
Status: ACTIVE | OUTPATIENT
Start: 2023-01-02 | End: 2023-01-02

## 2023-01-02 RX ORDER — LABETALOL HYDROCHLORIDE 5 MG/ML
5 INJECTION, SOLUTION INTRAVENOUS
Status: DISCONTINUED | OUTPATIENT
Start: 2023-01-02 | End: 2023-01-02 | Stop reason: HOSPADM

## 2023-01-02 RX ORDER — CEFAZOLIN SODIUM 1 G/3ML
2 INJECTION, POWDER, FOR SOLUTION INTRAMUSCULAR; INTRAVENOUS ONCE
Status: COMPLETED | OUTPATIENT
Start: 2023-01-02 | End: 2023-01-02

## 2023-01-02 RX ORDER — OXYCODONE HCL 5 MG/5 ML
5 SOLUTION, ORAL ORAL
Status: COMPLETED | OUTPATIENT
Start: 2023-01-02 | End: 2023-01-02

## 2023-01-02 RX ORDER — DEXAMETHASONE SODIUM PHOSPHATE 4 MG/ML
INJECTION, SOLUTION INTRA-ARTICULAR; INTRALESIONAL; INTRAMUSCULAR; INTRAVENOUS; SOFT TISSUE PRN
Status: DISCONTINUED | OUTPATIENT
Start: 2023-01-02 | End: 2023-01-02 | Stop reason: SURG

## 2023-01-02 RX ORDER — HYDROMORPHONE HYDROCHLORIDE 1 MG/ML
0.1 INJECTION, SOLUTION INTRAMUSCULAR; INTRAVENOUS; SUBCUTANEOUS
Status: DISCONTINUED | OUTPATIENT
Start: 2023-01-02 | End: 2023-01-02 | Stop reason: HOSPADM

## 2023-01-02 RX ORDER — HYDROMORPHONE HYDROCHLORIDE 1 MG/ML
0.2 INJECTION, SOLUTION INTRAMUSCULAR; INTRAVENOUS; SUBCUTANEOUS
Status: DISCONTINUED | OUTPATIENT
Start: 2023-01-02 | End: 2023-01-02 | Stop reason: HOSPADM

## 2023-01-02 RX ORDER — DEXAMETHASONE SODIUM PHOSPHATE 4 MG/ML
INJECTION, SOLUTION INTRA-ARTICULAR; INTRALESIONAL; INTRAMUSCULAR; INTRAVENOUS; SOFT TISSUE
Status: COMPLETED | OUTPATIENT
Start: 2023-01-02 | End: 2023-01-02

## 2023-01-02 RX ORDER — KETOROLAC TROMETHAMINE 30 MG/ML
INJECTION, SOLUTION INTRAMUSCULAR; INTRAVENOUS PRN
Status: DISCONTINUED | OUTPATIENT
Start: 2023-01-02 | End: 2023-01-02 | Stop reason: SURG

## 2023-01-02 RX ORDER — HALOPERIDOL 5 MG/ML
1 INJECTION INTRAMUSCULAR
Status: DISCONTINUED | OUTPATIENT
Start: 2023-01-02 | End: 2023-01-02 | Stop reason: HOSPADM

## 2023-01-02 RX ORDER — MIDAZOLAM HYDROCHLORIDE 1 MG/ML
INJECTION INTRAMUSCULAR; INTRAVENOUS PRN
Status: DISCONTINUED | OUTPATIENT
Start: 2023-01-02 | End: 2023-01-02 | Stop reason: SURG

## 2023-01-02 RX ORDER — MAGNESIUM HYDROXIDE 1200 MG/15ML
LIQUID ORAL
Status: COMPLETED | OUTPATIENT
Start: 2023-01-02 | End: 2023-01-02

## 2023-01-02 RX ORDER — DIPHENHYDRAMINE HYDROCHLORIDE 50 MG/ML
12.5 INJECTION INTRAMUSCULAR; INTRAVENOUS
Status: DISCONTINUED | OUTPATIENT
Start: 2023-01-02 | End: 2023-01-02 | Stop reason: HOSPADM

## 2023-01-02 RX ORDER — ONDANSETRON 2 MG/ML
4 INJECTION INTRAMUSCULAR; INTRAVENOUS
Status: COMPLETED | OUTPATIENT
Start: 2023-01-02 | End: 2023-01-02

## 2023-01-02 RX ORDER — MEPERIDINE HYDROCHLORIDE 25 MG/ML
12.5 INJECTION INTRAMUSCULAR; INTRAVENOUS; SUBCUTANEOUS
Status: DISCONTINUED | OUTPATIENT
Start: 2023-01-02 | End: 2023-01-02 | Stop reason: HOSPADM

## 2023-01-02 RX ORDER — HYDRALAZINE HYDROCHLORIDE 20 MG/ML
5 INJECTION INTRAMUSCULAR; INTRAVENOUS
Status: DISCONTINUED | OUTPATIENT
Start: 2023-01-02 | End: 2023-01-02 | Stop reason: HOSPADM

## 2023-01-02 RX ORDER — HYDROMORPHONE HYDROCHLORIDE 1 MG/ML
0.4 INJECTION, SOLUTION INTRAMUSCULAR; INTRAVENOUS; SUBCUTANEOUS
Status: DISCONTINUED | OUTPATIENT
Start: 2023-01-02 | End: 2023-01-02 | Stop reason: HOSPADM

## 2023-01-02 RX ADMIN — LIDOCAINE HYDROCHLORIDE 4 ML: 40 SOLUTION TOPICAL at 07:48

## 2023-01-02 RX ADMIN — FENTANYL CITRATE 50 MCG: 50 INJECTION, SOLUTION INTRAMUSCULAR; INTRAVENOUS at 09:41

## 2023-01-02 RX ADMIN — ROPIVACAINE HYDROCHLORIDE 30 ML: 5 INJECTION, SOLUTION EPIDURAL; INFILTRATION; PERINEURAL at 07:30

## 2023-01-02 RX ADMIN — MIDAZOLAM HYDROCHLORIDE 2 MG: 1 INJECTION, SOLUTION INTRAMUSCULAR; INTRAVENOUS at 07:30

## 2023-01-02 RX ADMIN — HYDROMORPHONE HYDROCHLORIDE 0.4 MG: 1 INJECTION, SOLUTION INTRAMUSCULAR; INTRAVENOUS; SUBCUTANEOUS at 10:30

## 2023-01-02 RX ADMIN — LIDOCAINE HYDROCHLORIDE 0.5 ML: 10 INJECTION, SOLUTION EPIDURAL; INFILTRATION; INTRACAUDAL at 07:08

## 2023-01-02 RX ADMIN — HYDROMORPHONE HYDROCHLORIDE 0.4 MG: 1 INJECTION, SOLUTION INTRAMUSCULAR; INTRAVENOUS; SUBCUTANEOUS at 10:15

## 2023-01-02 RX ADMIN — ONDANSETRON 4 MG: 2 INJECTION INTRAMUSCULAR; INTRAVENOUS at 10:01

## 2023-01-02 RX ADMIN — SODIUM CHLORIDE, POTASSIUM CHLORIDE, SODIUM LACTATE AND CALCIUM CHLORIDE: 600; 310; 30; 20 INJECTION, SOLUTION INTRAVENOUS at 07:08

## 2023-01-02 RX ADMIN — PROPOFOL 170 MG: 10 INJECTION, EMULSION INTRAVENOUS at 07:47

## 2023-01-02 RX ADMIN — SUGAMMADEX 200 MG: 100 INJECTION, SOLUTION INTRAVENOUS at 09:50

## 2023-01-02 RX ADMIN — FENTANYL CITRATE 50 MCG: 50 INJECTION, SOLUTION INTRAMUSCULAR; INTRAVENOUS at 08:00

## 2023-01-02 RX ADMIN — CEFAZOLIN 2 G: 330 INJECTION, POWDER, FOR SOLUTION INTRAMUSCULAR; INTRAVENOUS at 07:55

## 2023-01-02 RX ADMIN — KETOROLAC TROMETHAMINE 30 MG: 30 INJECTION, SOLUTION INTRAMUSCULAR at 09:30

## 2023-01-02 RX ADMIN — METHOCARBAMOL 1000 MG: 100 INJECTION INTRAMUSCULAR; INTRAVENOUS at 11:25

## 2023-01-02 RX ADMIN — FENTANYL CITRATE 50 MCG: 50 INJECTION, SOLUTION INTRAMUSCULAR; INTRAVENOUS at 07:47

## 2023-01-02 RX ADMIN — FENTANYL CITRATE 50 MCG: 50 INJECTION, SOLUTION INTRAMUSCULAR; INTRAVENOUS at 09:00

## 2023-01-02 RX ADMIN — DEXAMETHASONE SODIUM PHOSPHATE 2 MG: 4 INJECTION, SOLUTION INTRA-ARTICULAR; INTRALESIONAL; INTRAMUSCULAR; INTRAVENOUS; SOFT TISSUE at 07:30

## 2023-01-02 RX ADMIN — HYDROMORPHONE HYDROCHLORIDE 0.2 MG: 1 INJECTION, SOLUTION INTRAMUSCULAR; INTRAVENOUS; SUBCUTANEOUS at 10:35

## 2023-01-02 RX ADMIN — FENTANYL CITRATE 50 MCG: 50 INJECTION, SOLUTION INTRAMUSCULAR; INTRAVENOUS at 10:02

## 2023-01-02 RX ADMIN — FENTANYL CITRATE 50 MCG: 50 INJECTION, SOLUTION INTRAMUSCULAR; INTRAVENOUS at 10:10

## 2023-01-02 RX ADMIN — SODIUM CHLORIDE, POTASSIUM CHLORIDE, SODIUM LACTATE AND CALCIUM CHLORIDE: 600; 310; 30; 20 INJECTION, SOLUTION INTRAVENOUS at 09:43

## 2023-01-02 RX ADMIN — ONDANSETRON 4 MG: 2 INJECTION INTRAMUSCULAR; INTRAVENOUS at 09:30

## 2023-01-02 RX ADMIN — ROCURONIUM BROMIDE 40 MG: 10 INJECTION, SOLUTION INTRAVENOUS at 07:47

## 2023-01-02 RX ADMIN — DEXAMETHASONE SODIUM PHOSPHATE 6 MG: 4 INJECTION, SOLUTION INTRA-ARTICULAR; INTRALESIONAL; INTRAMUSCULAR; INTRAVENOUS; SOFT TISSUE at 07:47

## 2023-01-02 RX ADMIN — HYDROMORPHONE HYDROCHLORIDE 0.4 MG: 1 INJECTION, SOLUTION INTRAMUSCULAR; INTRAVENOUS; SUBCUTANEOUS at 10:45

## 2023-01-02 RX ADMIN — OXYCODONE HYDROCHLORIDE 10 MG: 5 SOLUTION ORAL at 10:04

## 2023-01-02 ASSESSMENT — FIBROSIS 4 INDEX: FIB4 SCORE: 1.01

## 2023-01-02 ASSESSMENT — PAIN DESCRIPTION - PAIN TYPE
TYPE: ACUTE PAIN;SURGICAL PAIN
TYPE: ACUTE PAIN;SURGICAL PAIN
TYPE: SURGICAL PAIN
TYPE: CHRONIC PAIN

## 2023-01-02 NOTE — ANESTHESIA PROCEDURE NOTES
Peripheral Block    Date/Time: 1/2/2023 7:30 AM  Performed by: Michael Perez M.D.  Authorized by: Michael Perez M.D.     Patient Location:  Pre-op  Start Time:  1/2/2023 7:30 AM  End Time:  1/2/2023 7:30 AM  Reason for Block: at surgeon's request and post-op pain management ONLY    patient identified, IV checked, site marked, risks and benefits discussed, surgical consent, monitors and equipment checked, pre-op evaluation and timeout performed    Patient Position:  Supine  Prep: ChloraPrep    Monitoring:  Heart rate, continuous pulse ox and cardiac monitor  Block Region:  Lower Extremity  Lower Extremity - Block Type:  SCIATIC nerve block, lateral approach    Laterality:  Left  Procedures: ultrasound guided  Image captured, interpreted and electronically stored.  Local Infiltration:  Lidocaine  Strength:  1 %  Dose:  3 ml  Block Type:  Single-shot  Needle Length:  100mm  Needle Gauge:  21 G  Needle Localization:  Ultrasound guidance  Injection Assessment:  Negative aspiration for heme, no paresthesia on injection, incremental injection and local visualized surrounding nerve on ultrasound  Evidence of intravascular injection: No     US Guided Sciatic Nerve Block   US probe placed several cm proximal to popliteal crease on posterior thigh and scanned caudad and cephalad until Sciatic Nerve (SN) identified superficial/lateral to popliteal artery.  Needle inserted lateral to probe in an in plane approach under direct visualization to a perineural position.  After negative aspiration LA injected with ease and visualized surrounding the SN. Printer broken unable to print image

## 2023-01-02 NOTE — INTERVAL H&P NOTE
H&P updated. The patient was examined and no changes   cardiovascular and pulmonary exam unremarkable.

## 2023-01-02 NOTE — ANESTHESIA PREPROCEDURE EVALUATION
Case: [id number] Date/Time: 01/02/23 0745    Procedures:       LEFT ANTERIOR/POSTERIOR ANKLE ARTHROSCOPY, EVALUATE PERONEAL TENDONS, LATERAL LIGAMENT RECONSTRUCTION, EVALUATE BIFURCATE LIGAMENT, REPAIRS AS INDICATED. (Left: Ankle)      LEFT ANTERIOR/POSTERIOR ANKLE ARTHROSCOPY, EVALUATE PERONEAL TENDONS, LATERAL LIGAMENT RECONSTRUCTION, EVALUATE BIFURCATE LIGAMENT, REPAIRS AS INDICATED (Left: Ankle)      REPAIR, TENDON, LOWER EXTREMITY, USING TENDON GRAFT. (Left)    Anesthesia type: General    Diagnosis: Ankle instability, left [M25.372]    Pre-op diagnosis: Ankle instability, left [M25.372]    Location: [place] [address] / SURGERY [facility]    Surgeons: Matti Carlos M.D.        6/10 pain at basline  Relevant Problems   Other   (positive) Ankle instability, left       Physical Exam    Airway   Mallampati: II  TM distance: >3 FB  Neck ROM: full       Cardiovascular - normal exam  Rhythm: regular  Rate: normal  (-) murmur     Dental - normal exam           Pulmonary - normal exam  Breath sounds clear to auscultation     Abdominal    Neurological - normal exam                 Anesthesia Plan    ASA 3   ASA physical status 3 criteria: morbid obesity - BMI greater than or equal to 40    Plan - general and peripheral nerve block     Peripheral nerve block will be post-op pain control  Airway plan will be ETT          Induction: intravenous    Postoperative Plan: Postoperative administration of opioids is intended.    Pertinent diagnostic labs and testing reviewed    Informed Consent:    Anesthetic plan and risks discussed with patient.    Use of blood products discussed with: patient whom consented to blood products.

## 2023-01-02 NOTE — OR NURSING
0956: Pt arrived from or, handoff received from anesthesiologist and RN. Patient waking up, gag reflex intact-OPA removed. Dressing to left lower extremity, C/D/I, toes pink/warm. VSS.     1000/1010/1015: Medicated for pain and nausea.     1030: Continuing to medicated for pain, nausea improved. Anesthesia aware of pain still experiencing high pain despite medication administrations, order for 1g IV Robaxin received.     1100: Patient tolerating oral intake of water. Aunt updated on status.     1115: patient able to void without difficulty.     1200: Patient states pain improved, anesthesia assessing patient at bedside. Order for traction for brace placed.     1210: Report given to phase 2 RNCharmaine.

## 2023-01-02 NOTE — DISCHARGE INSTRUCTIONS
HOME CARE INSTRUCTIONS    ACTIVITY: Rest and take it easy for the first 24 hours.  A responsible adult is recommended to remain with you during that time.  It is normal to feel sleepy.  We encourage you to not do anything that requires balance, judgment or coordination.    FOR 24 HOURS DO NOT:  Drive, operate machinery or run household appliances.  Drink beer or alcoholic beverages.  Make important decisions or sign legal documents.    SPECIAL INSTRUCTIONS:   Weight bearing as tolerated in stirrup brace  Refer to ISMA packet for further information    DIET: To avoid nausea, slowly advance diet as tolerated, avoiding spicy or greasy foods for the first day.  Add more substantial food to your diet according to your physician's instructions.  Babies can be fed formula or breast milk as soon as they are hungry.  INCREASE FLUIDS AND FIBER TO AVOID CONSTIPATION.    SURGICAL DRESSING/BATHING: Keep dressing clean and dry    MEDICATIONS: Resume taking daily medication.  Take prescribed pain medication with food.  If no medication is prescribed, you may take non-aspirin pain medication if needed.  PAIN MEDICATION CAN BE VERY CONSTIPATING.  Take a stool softener or laxative such as senokot, pericolace, or milk of magnesia if needed.    Prescription given for oxycodone, vistaril, gabapentin, and tylenol.  Last pain medication given at 10:01am.    A follow-up appointment should be arranged with your doctor; call to schedule.    You should CALL YOUR PHYSICIAN if you develop:  Fever greater than 101 degrees F.  Pain not relieved by medication, or persistent nausea or vomiting.  Excessive bleeding (blood soaking through dressing) or unexpected drainage from the wound.  Extreme redness or swelling around the incision site, drainage of pus or foul smelling drainage.  Inability to urinate or empty your bladder within 8 hours.  Problems with breathing or chest pain.    You should call 911 if you develop problems with breathing or chest  pain.  If you are unable to contact your doctor or surgical center, you should go to the nearest emergency room or urgent care center.  Physician's telephone #: [phone]    MILD FLU-LIKE SYMPTOMS ARE NORMAL.  YOU MAY EXPERIENCE GENERALIZED MUSCLE ACHES, THROAT IRRITATION, HEADACHE AND/OR SOME NAUSEA.    If any questions arise, call your doctor.  If your doctor is not available, please feel free to call the Surgical Center at [phone].  The Center is open Monday through Friday from 7AM to 7PM.      A registered nurse may call you a few days after your surgery to see how you are doing after your procedure.    You may also receive a survey in the mail within the next two weeks and we ask that you take a few moments to complete the survey and return it to us.  Our goal is to provide you with very good care and we value your comments.     Depression / Suicide Risk    As you are discharged from this [facility] facility, it is important to learn how to keep safe from harming yourself.    Recognize the warning signs:  Abrupt changes in personality, positive or negative- including increase in energy   Giving away possessions  Change in eating patterns- significant weight changes-  positive or negative  Change in sleeping patterns- unable to sleep or sleeping all the time   Unwillingness or inability to communicate  Depression  Unusual sadness, discouragement and loneliness  Talk of wanting to die  Neglect of personal appearance   Rebelliousness- reckless behavior  Withdrawal from people/activities they love  Confusion- inability to concentrate     If you or a loved one observes any of these behaviors or has concerns about self-harm, here's what you can do:  Talk about it- your feelings and reasons for harming yourself  Remove any means that you might use to hurt yourself (examples: pills, rope, extension cords, firearm)  Get professional help from the community (Mental Health, Substance Abuse, psychological  counseling)  Do not be alone:Call your Safe Contact- someone whom you trust who will be there for you.  Call your local CRISIS HOTLINE 859-7900 or 129-890-0536  Call your local Children's Mobile Crisis Response Team [place] (900) 696-2811 or www.Planana  Call the toll free National Suicide Prevention Hotlines   National Suicide Prevention Lifeline 137-290-YGHF (7935)  [facility] Line Network 800-SUICIDE (374-7339)    I acknowledge receipt and understanding of these Home Care instructions.

## 2023-01-02 NOTE — ANESTHESIA TIME REPORT
Anesthesia Start and Stop Event Times     Date Time Event    1/2/2023 0730 Ready for Procedure     0741 Anesthesia Start     0959 Anesthesia Stop        Responsible Staff  01/02/23    Name Role Begin End    Michael Perez M.D. Anesth 0741 0994        Overtime Reason:  no overtime (within assigned shift)    Comments:

## 2023-01-02 NOTE — ANESTHESIA PROCEDURE NOTES
Airway    Date/Time: 1/2/2023 7:48 AM  Performed by: Michael Perez M.D.  Authorized by: Michael Perez M.D.     Location:  OR  Urgency:  Elective  Indications for Airway Management:  Anesthesia      Spontaneous Ventilation: absent    Sedation Level:  Deep  Preoxygenated: Yes    Patient Position:  Sniffing  Mask Difficulty Assessment:  0 - not attempted  Final Airway Type:  Endotracheal airway  Final Endotracheal Airway:  ETT  Cuffed: Yes    Technique Used for Successful ETT Placement:  Direct laryngoscopy    Insertion Site:  Oral  Blade Type:  Yuan  Laryngoscope Blade/Videolaryngoscope Blade Size:  3  ETT Size (mm):  7.0  Measured from:  Teeth  ETT to Teeth (cm):  21  Placement Verified by: auscultation and capnometry    Cormack-Lehane Classification:  Grade I - full view of glottis  Number of Attempts at Approach:  1

## 2023-01-02 NOTE — LETTER
December 19, 2022    Patient Name: Abril Vance  Surgeon Name: Matti Carlos M.D.  Surgery Facility: [facility] ([address] [facility])  Surgery Date: 1/2/2023    The time of your surgery is not final and may change up to and until the day of your surgery. You will be contacted 24-48 hours prior to your surgery date with your check-in and surgery time.    If you will not be at one of the below numbers please call the surgery scheduler at [phone]  Preferred Phone: [phone]    BEFORE YOUR SURGERY   Pre Registration and/or Lab Work must be done within and no earlier than 28 days prior to your surgery date. Please call [facility] at [phone] for an appointment as soon as possible.    COVID testing is not required for non-symptomatic vaccinated patients with proof of vaccination at [facility].  For un-vaccinated patients please refer to the following instructions for your COVID testing requirements:    COVID test required 4-7 days prior to surgery, failure to do so can result in a cancellation.    The following locations offer COVID testing:    Approved facilities for COVID testing, if scheduled at [facility]:  · [facility] from 7:30am-3:30pm at [address], [place]  · [facility] [phone] (Please call for an appointment)  · Your local pharmacy    Not scheduled at [facility] contact the scheduled facility for approved testing facilities.    Pre op Appointment:    Instructions: Bring a list of all medications you are taking including the dosing and frequency.    DAY OF YOUR SURGERY  Nothing to eat or drink after midnight     Refrain from smoking any substance after midnight prior to surgery. Smoking may interfere with the anesthetic and frequently produces nausea during the recovery period.    Continue taking all lifesaving medications. Including the morning of your surgery with small sip of water.    Please arrive at the  hospital/surgery center at the check-in time provided.     An adult will need to bring you and take you home after your surgery.     AFTER YOUR SURGERY  Post op Appointment:   Date: 01/17/23   Time: 10:00AM   With: Ilsa Richards M.D.   Location: [facility] [place] [address] [place] [address]    - Therapy- Your first appointment should be 2  week(s) after your surgery. For your convenience we have 4 Physical Therapy locations: [facility], [place], and [facility]. Call our office ASAP to schedule an appointment at [phone] or take the enclosed Therapy Prescription to a facility of your choice.    TIME OFF WORK  FMLA or Disability forms can be faxed directly to: [phone] or you may drop them off at [address] [place] [address]. Our office charges a $35.00 fee per form. Forms will be completed within 10 business days of drop off and payment received. For the status of your forms you may contact our disability office directly [phone].    MEDICATION INSTRUCTIONS **Please read section completely**    The following medications should be stopped a minimum of 10 days prior to surgery:  All over the counter, Supplements & Herbal medications    Anorectics: Phentermine (Adipex-P, Lomaira and Suprenza), Phentermine-topiramate (Qsymia), Bupropion-naltrexone (Contrave)    Opiod Partial Agonists/Opioid Antagonists: Buprenorphine (Subocone, Belbuca, Butrans, Probuphine Implant, Sublocade), Naltrexone (ReVia, Vivitrol), Naloxone    Amphetamines: Dextroamphetamine/Amphetamine (Adderall, Mydayis), Methylphenidate Hydrochloride (Concerta, Metadate, Methylin, Ritalin)    The following medications should be stopped 5 days prior to surgery:  Blood Thinners: Any Aspirin, Aspirin products, anti-inflammatories such as ibuprofen and any blood thinners such as Coumadin and Plavix. Please consult your prescribing physician if you are on life saving blood thinners, in regards to when to stop medications prior to  surgery.     The following medications should be stopped a minimum of 3 days prior to surgery:  PDE-5 inhibitors: Sildenafil (Viagra), Tadalafil (Cialis), Vardenafil (Levitra), Avanafil (Stendra)    MAO Inhibitors: Rasagiline (Azilect), Selegiline (Eldepryl, Emsam, Selapar), Isocarboxazid (Marplan), Phenelzine (Nardil)

## 2023-01-02 NOTE — OP REPORT
01/02/23       PREOPERATIVE DIAGNOSES:  Left ankle arthrofibrosis  Left osteochondral defect of the medial and lateral talar dome  Left posterior impingement syndrome  Left peroneus longus and peroneus brevis tendon tear  Left ankle instability  Left transverse tarsal sprain     POSTOPERATIVE DIAGNOSES:  Same    PROCEDURE PERFORMED:  Left ankle arthroscopic extensive debridement  Left ankle curettage for bone marrow stimulation osteochondral defect talus medial and lateral dome  Left subtalar arthroscopic debridement  Left excision os trigonum  Left flexor houses longus synovectomy  Left repair peroneus longus tendon tear  Left repair peroneus brevis tendon tear  Left modified Broström reconstruction of the ATFL and CFL ligament  Left partial excision anterior process the calcaneus  Left calcaneocuboid joint synovectomy     SURGEON:  Matti Carlos MD     FIRST ASSISTANT:  Ilsa Richards DO     SECOND ASSISTANT:  Josie Lomas CFA     ANESTHESIA:  General endotracheal with regional block per my request postoperative pain management     ESTIMATED BLOOD LOSS:  None.     COMPLICATIONS:  None.    FINDINGS:  Medial osteochondral defect of the talus measuring 5 mm in diameter  Lateral osteochondral defect of the talus measuring 5 x 8 mm in diameter     POSTOPERATIVE PLAN:  Weightbearing as tolerated in a stirrup brace  Follow-up in 2 weeks     INDICATIONS:  The patient is a pleasant 32 y.o. female who has had   problems with the left ankle.  Options were discussed   including operative and nonoperative.  The patients elected to undergo operative   intervention.  The above procedure was discussed.  All questions were   answered.  Risks of surgery explained, which included but not limited to   explained wound problems, infection, nerve injury, vascular injury, need for   further surgery.  The patient understands that they could have persistent risk of    pain and need for further surgery.  The patients understands  and accepts these risks   and agreed to proceed.  Site was marked by myself prior to receiving   psychotropic medicines.     PROCEDURE IN DETAIL:  The patient was brought to the operating room and    underwent general endotracheal anesthetic without complications.  Left lower   extremity was prepped and draped in standard fashion in supine position with   all appropriate padding.  Positive site verification confirmed the appropriate   extremity as well as above procedure and confirmation that the patient received   preoperative antibiotics.  The leg was elevated and tourniquet was inflated to 250 mmHg.    An 18-gauge needle was placed medial to the preoperatively marked   tibialis anterior at the level of the joint line.  Ankle was insufflated with   10 mL normal saline.  A 15 blade was used to make a full-thickness arthrotomy   and 4.0 scope was introduced.  An 18-gauge needle was then placed lateral to   the preoperative marked peroneus tertius at the level of joint line.  Skin   incision was made and blunt dissection was made down to the joint.  Shaver was   introduced.  There was extensive arthrofibrosis and synovitis at the   anterolateral aspect of her ankle down to the lateral gutter.  This was all   debrided.  There was also had some hypertrophic tissue in the syndesmosis, which   was also debrided.  All instrumentation was removed and the scope was placed into the lateral portal and shaver was introduced through the medial portal.  Debridement was perfomed in the anteromedial ankle and down into the medial gutter. On evaluation of the cartilage on the talus, it was noted that there was an osteochondral lesion over the medial and lateral talar dome.  This was delineated with a probe curetted out to stable margins and down into subchondral bone for bone marrow stimulation.  Final measurements of the lesion was 5 mm in diameter on the medial side and 5 x 8 mm in diameter on the lateral side.  Remaining  cartilage in the ankle appeared to be pristine with no significant degenerative changes.   Portals were closed with interrupted nylon.    Patient was then repositioned prepped and draped in the prone position.  An incision was made just medial to the Achilles tendon at the level of the subtalar joint.  Blunt dissection was made down to the subtalar joint.  The 4.0 scope was introduced.  A second incision was then made centered between the Achilles tendon and the fibula at the level of the subtalar joint.  Blunt dissection is made down to the subtalar joint.  The shaver was introduced.  Under direct visualization, the shaver was used to expose the posterior lateral process of the talus.  The shaver was then exchanged for the barrel halima and the posterior lateral process of the talus was burred back to the level of the subtalar joint.  There is no evidence for impingement with the ankle in plantarflexion.  Flexor hallucis longus was noted to have inflammation and synovitis circumferentially around it.  Shaver was then used to perform a full synovectomy of the flexor hallucis longus from the muscle belly down into the canal.  The tendon itself appeared to be pristine.  Shaver was then used to perform a debridement and synovectomy of the subtalar joint extending from the medial side deep to the flexor houses longus circumflex eventually wound to the lateral side.  Evaluation of the joint demonstrated no obvious cartilage defects.  All  instrumentation was removed and the scope portals were closed with interrupted nylon suture.    An incision was made over the anterior process of calcaneus.  Given dissect down avoid injury to the sural nerve.  The extensor digitorum brevis was identified and elevated up exposing the anterior process calcaneus.  An osteotome was used to remove the Antipressan calcaneus to the level of the cuboid.  There is some synovitis in the calcaneocuboid joint and this was excised.  There is no  evidence of degenerative changes.  The tissue including the bifurcate ligament was laid back on top of the Antipressan calcaneus wound is irrigated out and closed with 3 Vicryl 3 nylon.    A curvilinear incision was made at the distal aspect of the fibulaand dissection was performed for a supraperiosteal level.  Incision was then made distal to the SPR along the peroneal tendon sheath and the peroneal tendons were evaluated.  There was a split tear of both the peroneus longus and peroneus brevis tendons.  Peroneus longus was then pulled out through the incision using a 15 blade the split tear was repaired by partial excision.  Evaluation of the peroneus brevis showed similar findings and was treated exactly the same.     The peroneal tendons were retracted exposing the CFL.  This was released off its superior and inferior margins directly off the fibula.  The ATFL was then identified and released off its superior and inferior margins and directly off of the fibula.  Fibula periosteum was elevated up and a rongeur was used to rongeured down into good subchondral bone.  A 3.5 corkscrew anchor was placed at the junction of the ATFL and CFL.  One suture was brought through the ATFL the other was brought through the CFL.  The foot was held in plantarflexion E version and the CFL ligament was pretensioned and tied.  Foot was brought in dorsiflexion and eversion and the ATFL ligament was pretension tight.  The extensor retinaculum was then repaired to the fibular periosteum with multiple 0 Vicryl's in a mattress type fashion.  The ankle had excellent stability to ATFL and CFL testing.  The wound was irrigated out the copious irrigation was closed with 3-0 Vicryl and 3-0 nylon.    Sterile dressings were applied.  Tourniquet was released.  She was placed in a stirrup brace.  Patient was transferred to the recovery room in good condition.    Utilization of Dr. Richards was necessary for patient positioning needing holding  retracting wound closure and dressing placement.  The assistant was present throughout the entire procedure.

## 2023-01-02 NOTE — ANESTHESIA POSTPROCEDURE EVALUATION
Patient: Abril Vance    Procedure Summary     Date: 01/02/23 Room / Location: [place] [address] / SURGERY [facility]    Anesthesia Start: 0741 Anesthesia Stop: 0959    Procedures:       LEFT ANTERIOR/POSTERIOR ANKLE ARTHROSCOPY, EVALUATE PERONEAL TENDONS, LATERAL LIGAMENT RECONSTRUCTION, EVALUATE BIFURCATE LIGAMENT, REPAIRS AS INDICATED. (Left: Ankle)      LEFT ANTERIOR/POSTERIOR ANKLE ARTHROSCOPY, EVALUATE PERONEAL TENDONS, LATERAL LIGAMENT RECONSTRUCTION, EVALUATE BIFURCATE LIGAMENT, REPAIRS AS INDICATED (Left: Ankle)      REPAIR, TENDON, LOWER EXTREMITY, USING TENDON GRAFT. (Left: Ankle) Diagnosis:       Ankle instability, left      (Ankle instability, left )    Surgeons: Matti Carlos M.D. Responsible Provider: Michael Perez M.D.    Anesthesia Type: general, peripheral nerve block ASA Status: 2          Final Anesthesia Type: general, peripheral nerve block  Last vitals  BP   Blood Pressure: 101/62    Temp   36.2 °C (97.2 °F)    Pulse   63   Resp   19    SpO2   99 %      Anesthesia Post Evaluation    Patient location during evaluation: PACU  Patient participation: complete - patient participated  Level of consciousness: awake and alert    Airway patency: patent  Anesthetic complications: no  Cardiovascular status: hemodynamically stable  Respiratory status: acceptable  Hydration status: euvolemic  Comments: Patient states inside of ankle pain is tolerable. Toes are numb from block.     PONV: none          No notable events documented.     Nurse Pain Score: 5 (NPRS)

## 2023-01-02 NOTE — OR NURSING
Patient arrived to unit at 1223. Patient is AOx4. Transferred to chair appropriately. Patient's pain is 5/10. Declines pain medication at this time. Patient's dressings to LLE are CDI. Patient voiding appropriately. Tolerating liquids at this time. Stirrup brace delivered by traction to the patient at bedside. Discharge instructions discussed with the patient. Patient denies any further questions at this time. Patient discharged home to responsible adult at 1255.

## 2023-02-26 ENCOUNTER — APPOINTMENT (OUTPATIENT)
Dept: RADIOLOGY | Facility: MEDICAL CENTER | Age: 33
End: 2023-02-26
Attending: EMERGENCY MEDICINE
Payer: COMMERCIAL

## 2023-02-26 ENCOUNTER — HOSPITAL ENCOUNTER (EMERGENCY)
Facility: MEDICAL CENTER | Age: 33
End: 2023-02-26
Attending: EMERGENCY MEDICINE
Payer: COMMERCIAL

## 2023-02-26 VITALS
BODY MASS INDEX: 41.76 KG/M2 | DIASTOLIC BLOOD PRESSURE: 68 MMHG | HEIGHT: 63 IN | OXYGEN SATURATION: 98 % | TEMPERATURE: 97.8 F | WEIGHT: 235.67 LBS | SYSTOLIC BLOOD PRESSURE: 121 MMHG | RESPIRATION RATE: 18 BRPM | HEART RATE: 67 BPM

## 2023-02-26 DIAGNOSIS — N61.1 ABSCESS OF RIGHT BREAST: ICD-10-CM

## 2023-02-26 LAB
BASOPHILS # BLD AUTO: 0.8 % (ref 0–1.8)
BASOPHILS # BLD: 0.05 K/UL (ref 0–0.12)
EOSINOPHIL # BLD AUTO: 0.17 K/UL (ref 0–0.51)
EOSINOPHIL NFR BLD: 2.7 % (ref 0–6.9)
ERYTHROCYTE [DISTWIDTH] IN BLOOD BY AUTOMATED COUNT: 45.4 FL (ref 35.9–50)
HCT VFR BLD AUTO: 37.7 % (ref 37–47)
HGB BLD-MCNC: 12.3 G/DL (ref 12–16)
IMM GRANULOCYTES # BLD AUTO: 0.02 K/UL (ref 0–0.11)
IMM GRANULOCYTES NFR BLD AUTO: 0.3 % (ref 0–0.9)
LYMPHOCYTES # BLD AUTO: 1.8 K/UL (ref 1–4.8)
LYMPHOCYTES NFR BLD: 28.6 % (ref 22–41)
MCH RBC QN AUTO: 32.7 PG (ref 27–33)
MCHC RBC AUTO-ENTMCNC: 32.6 G/DL (ref 33.6–35)
MCV RBC AUTO: 100.3 FL (ref 81.4–97.8)
MONOCYTES # BLD AUTO: 0.5 K/UL (ref 0–0.85)
MONOCYTES NFR BLD AUTO: 7.9 % (ref 0–13.4)
NEUTROPHILS # BLD AUTO: 3.76 K/UL (ref 2–7.15)
NEUTROPHILS NFR BLD: 59.7 % (ref 44–72)
NRBC # BLD AUTO: 0 K/UL
NRBC BLD-RTO: 0 /100 WBC
PLATELET # BLD AUTO: 275 K/UL (ref 164–446)
PMV BLD AUTO: 11 FL (ref 9–12.9)
RBC # BLD AUTO: 3.76 M/UL (ref 4.2–5.4)
WBC # BLD AUTO: 6.3 K/UL (ref 4.8–10.8)

## 2023-02-26 PROCEDURE — 76604 US EXAM CHEST: CPT

## 2023-02-26 PROCEDURE — 96365 THER/PROPH/DIAG IV INF INIT: CPT

## 2023-02-26 PROCEDURE — 700111 HCHG RX REV CODE 636 W/ 250 OVERRIDE (IP): Performed by: EMERGENCY MEDICINE

## 2023-02-26 PROCEDURE — 85025 COMPLETE CBC W/AUTO DIFF WBC: CPT

## 2023-02-26 PROCEDURE — 99284 EMERGENCY DEPT VISIT MOD MDM: CPT

## 2023-02-26 PROCEDURE — 96375 TX/PRO/DX INJ NEW DRUG ADDON: CPT

## 2023-02-26 PROCEDURE — 36415 COLL VENOUS BLD VENIPUNCTURE: CPT

## 2023-02-26 PROCEDURE — 700105 HCHG RX REV CODE 258: Performed by: EMERGENCY MEDICINE

## 2023-02-26 RX ORDER — KETOROLAC TROMETHAMINE 30 MG/ML
30 INJECTION, SOLUTION INTRAMUSCULAR; INTRAVENOUS ONCE
Status: COMPLETED | OUTPATIENT
Start: 2023-02-26 | End: 2023-02-26

## 2023-02-26 RX ORDER — KETOROLAC TROMETHAMINE 10 MG/1
10 TABLET, FILM COATED ORAL 3 TIMES DAILY PRN
Qty: 15 TABLET | Refills: 0 | Status: SHIPPED | OUTPATIENT
Start: 2023-02-26 | End: 2023-04-19

## 2023-02-26 RX ORDER — AMOXICILLIN AND CLAVULANATE POTASSIUM 500; 125 MG/1; MG/1
1 TABLET, FILM COATED ORAL 3 TIMES DAILY
Qty: 30 TABLET | Refills: 0 | Status: ACTIVE | OUTPATIENT
Start: 2023-02-26 | End: 2023-04-19

## 2023-02-26 RX ORDER — KETOROLAC TROMETHAMINE 30 MG/ML
60 INJECTION, SOLUTION INTRAMUSCULAR; INTRAVENOUS ONCE
Status: DISCONTINUED | OUTPATIENT
Start: 2023-02-26 | End: 2023-02-26

## 2023-02-26 RX ADMIN — KETOROLAC TROMETHAMINE 30 MG: 30 INJECTION, SOLUTION INTRAMUSCULAR; INTRAVENOUS at 20:59

## 2023-02-26 RX ADMIN — AMPICILLIN AND SULBACTAM 3 G: 1; 2 INJECTION, POWDER, FOR SOLUTION INTRAMUSCULAR; INTRAVENOUS at 22:01

## 2023-02-26 ASSESSMENT — FIBROSIS 4 INDEX: FIB4 SCORE: 1.01

## 2023-02-27 NOTE — ED PROVIDER NOTES
ER Provider Note    Scribed for Mckenzie Shaikh D.o. by Eren Aceves. 2/26/2023  8:27 PM    Primary Care Provider: Marleny Faustin M.D.    CHIEF COMPLAINT  Chief Complaint   Patient presents with    Lump     Blood and discharge     EXTERNAL RECORDS REVIEWED  No notes available    HPI/ROS  LIMITATION TO HISTORY   Select: : None  OUTSIDE HISTORIAN(S):  None    Abril Vance is a 32 y.o. female who presents to the ED for evaluation of a lump noted on her right nipple onset 2 days ago. The patient states she also has pain around the site, resolved 102 °F fever, and bloody discharge, but denies any other symptoms. She describes her lump as being localized around her right nipple. She is not currently breast feeding, and denies any possibility of pregnancy secondary to prior hysterectomy 5 years ago. She denies any history of breast cancer, diabetes, seizures, asthma, or ulcers. She denies sustaining any trauma to the area, including prior piercing. No alleviating or exacerbating factors were noted.     PAST MEDICAL HISTORY  Past Medical History:   Diagnosis Date    Arthritis     RA- hands, knees    Gallstones     2008    History of anemia     Migraines 4/5/2010    Morbid obesity (HCC) 4/5/2010    Sleep apnea     does not use cpap, pt reports that she was told it is a mild case    Snoring     sleep study done    Vaginal trichomoniasis 4/5/2010     SURGICAL HISTORY  Past Surgical History:   Procedure Laterality Date    WI UNLISTED PROC, ARTHROSCOPY Left 1/2/2023    Procedure: LEFT ANTERIOR/POSTERIOR ANKLE ARTHROSCOPY, EVALUATE PERONEAL TENDONS, LATERAL LIGAMENT RECONSTRUCTION, EVALUATE BIFURCATE LIGAMENT, REPAIRS AS INDICATED.;  Surgeon: Matti Carlos M.D.;  Location: SURGERY [facility];  Service: Orthopedics    PB REPAIR COLLAT ANKLE LIGMNT,SECONDARY Left 1/2/2023    Procedure: LEFT ANTERIOR/POSTERIOR ANKLE ARTHROSCOPY, EVALUATE PERONEAL TENDONS, LATERAL LIGAMENT RECONSTRUCTION, EVALUATE BIFURCATE LIGAMENT,  REPAIRS AS INDICATED;  Surgeon: Matti Carlos M.D.;  Location: SURGERY [facility];  Service: Orthopedics    PB RELEASE TIB/FIB/ANKLE FLEX TENDON,EA Left 1/2/2023    Procedure: REPAIR, TENDON, LOWER EXTREMITY, USING TENDON GRAFT.;  Surgeon: Matti Carlos M.D.;  Location: SURGERY [facility];  Service: Orthopedics    VAGINAL HYSTERECTOMY SCOPE TOTAL  9/25/2018    Procedure: VAGINAL HYSTERECTOMY SCOPE TOTAL;  Surgeon: Abram Dupree M.D.;  Location: SURGERY SAME DAY [facility];  Service: Gynecology    SALPINGECTOMY Bilateral 9/25/2018    Procedure: SALPINGECTOMY;  Surgeon: Abram Dupree M.D.;  Location: SURGERY SAME DAY [facility];  Service: Gynecology    ANTERIOR AND POSTERIOR REPAIR  9/25/2018    Procedure: ANTERIOR AND POSTERIOR REPAIR;  Surgeon: Abram Dupree M.D.;  Location: SURGERY SAME DAY [facility];  Service: Gynecology    ENTEROCELE REPAIR  9/25/2018    Procedure: ENTEROCELE REPAIR- PERINEOPLASTY;  Surgeon: Abram Dupree M.D.;  Location: SURGERY SAME DAY [facility];  Service: Gynecology    BLADDER SLING FEMALE  9/25/2018    Procedure: BLADDER SLING FEMALE- TOT;  Surgeon: Abram Dupree M.D.;  Location: SURGERY SAME DAY [facility];  Service: Gynecology    VAGINAL SUSPENSION  9/25/2018    Procedure: VAGINAL SUSPENSION- POSS SACROSPINOUS VAULT;  Surgeon: Abram Dupree M.D.;  Location: SURGERY SAME DAY [facility];  Service: Gynecology    OTHER  2016    gastric sleeve    PATEL BY LAPAROSCOPY  10/14/2010    Performed by YAIR ENNIS at [facility]     FAMILY HISTORY  Family History   Family history unknown: Yes     SOCIAL HISTORY   reports that she has never smoked. She has never used smokeless tobacco. She reports current alcohol use. She reports that she does not use drugs.    CURRENT MEDICATIONS  Previous Medications    ACETAMINOPHEN (TYLENOL) 500 MG TAB    Take 1-2 Tablets by mouth every 6 hours as needed for Moderate Pain.    ACETAMINOPHEN (TYLENOL) 500 MG TAB    " Take 2 tabs up to 3 times a day prn pain    GABAPENTIN (NEURONTIN) 300 MG CAP    Take 1 po qhs    GABAPENTIN (NEURONTIN) 300 MG CAP    Take 1-2 Capsules by mouth 3 times a day.    HYDROXYZINE PAMOATE (VISTARIL) 50 MG CAP    Take 1 Capsule by mouth 3 times a day as needed for Itching or Other (nausea, anxiety, insomnia).    IBUPROFEN (MOTRIN) 600 MG TAB    Take 1 Tablet by mouth every 6 hours as needed for Mild Pain or Moderate Pain.    IRON PO    Take 325 mg by mouth every morning.    LIDOCAINE (LIDODERM) 5 % PATCH    Place 1 Patch on the skin every 24 hours.    MELOXICAM (MOBIC) 15 MG TABLET    Take 1 Tablet by mouth every day.    METHOCARBAMOL (ROBAXIN) 500 MG TAB    Take 1 Tablet by mouth 3 times a day.    METOCLOPRAMIDE (REGLAN) 10 MG TAB    Take 1 Tablet by mouth 4 times a day as needed (headache and nausea).    ONDANSETRON (ZOFRAN ODT) 4 MG TABLET DISPERSIBLE    Take 1 Tab by mouth every 8 hours as needed.    ONDANSETRON (ZOFRAN ODT) 4 MG TABLET DISPERSIBLE    Take 1 Tablet by mouth every 6 hours as needed for Nausea.    PHENTERMINE (ADIPEX-P) 37.5 MG TABLET    Take 37.5 mg by mouth every morning before breakfast.     ALLERGIES  Nkda [no known drug allergy]    PHYSICAL EXAM  /70   Pulse 66   Temp 36.2 °C (97.2 °F) (Temporal)   Resp 18   Ht 1.6 m (5' 3\")   Wt 107 kg (235 lb 10.8 oz)   LMP 09/20/2018 (Approximate)   SpO2 99% Comment: Room air  BMI 41.75 kg/m²   Constitutional: Patient is well developed, well nourished. Non-toxic appearing.  Mild distress.  HENT: Normocephalic, atraumatic. Nose normal with no mucosal edema or drainage. Oropharynx moist without erythema or exudates.  Eyes: PERRL, EOMI, Conjunctiva without erythema   Neck: Supple.   Cardiovascular: Normal heart rate and Regular rhythm. No murmur  Thorax & Lungs: Clear and equal breath sounds with good excursion. No respiratory distress  Abdomen: Soft,nontender, no rebound , guarding, palpable masses.   Skin: Warm, Dry, No rashes. " Right breast has no erythema, warmth, or drainage, but there is a palpable 1 cm mass right below the nipple. Very tender to the touch. No other masses palpated. No axillary lymphadenopathy.  Extremities: Peripheral pulses 4/4 No edema, No tenderness,  Neurologic: Alert & oriented x 3, Normal motor function, Normal sensory function.  Psychiatric: Affect normal, Judgment normal, Mood normal.     DIAGNOSTIC STUDIES    Labs:   Results for orders placed or performed during the hospital encounter of 02/26/23   CBC WITH DIFFERENTIAL   Result Value Ref Range    WBC 6.3 4.8 - 10.8 K/uL    RBC 3.76 (L) 4.20 - 5.40 M/uL    Hemoglobin 12.3 12.0 - 16.0 g/dL    Hematocrit 37.7 37.0 - 47.0 %    .3 (H) 81.4 - 97.8 fL    MCH 32.7 27.0 - 33.0 pg    MCHC 32.6 (L) 33.6 - 35.0 g/dL    RDW 45.4 35.9 - 50.0 fL    Platelet Count 275 164 - 446 K/uL    MPV 11.0 9.0 - 12.9 fL    Neutrophils-Polys 59.70 44.00 - 72.00 %    Lymphocytes 28.60 22.00 - 41.00 %    Monocytes 7.90 0.00 - 13.40 %    Eosinophils 2.70 0.00 - 6.90 %    Basophils 0.80 0.00 - 1.80 %    Immature Granulocytes 0.30 0.00 - 0.90 %    Nucleated RBC 0.00 /100 WBC    Neutrophils (Absolute) 3.76 2.00 - 7.15 K/uL    Lymphs (Absolute) 1.80 1.00 - 4.80 K/uL    Monos (Absolute) 0.50 0.00 - 0.85 K/uL    Eos (Absolute) 0.17 0.00 - 0.51 K/uL    Baso (Absolute) 0.05 0.00 - 0.12 K/uL    Immature Granulocytes (abs) 0.02 0.00 - 0.11 K/uL    NRBC (Absolute) 0.00 K/uL     Radiology:   The attending emergency physician has independently interpreted the diagnostic imaging associated with this visit and am waiting the final reading from the radiologist.   Radiologist interpretation:   US-CHEST   Final Result      1.  There is a right retroareolar homogeneous hypoechoic masslike area probably debris-filled fluid based on the clinical history with surrounding hyperemia. This is suspicious for a small abscess.        COURSE & MEDICAL DECISION MAKING     ED Observation Status? Yes; I am placing  the patient in to an observation status due to a diagnostic uncertainty as well as therapeutic intensity. Patient placed in observation status at 8:34 PM, 2/26/2023.     Observation plan is as follows: Pain control, ultrasound, IV antibiotics    Upon Reevaluation, the patient's condition has: Improved; and will be discharged.    Patient discharged from ED Observation status at 9:58 PM (Time) 2/26/2023 (Date).     INITIAL ASSESSMENT, COURSE AND PLAN  Care Narrative:     8:36 PM - Patient was evaluated at bedside. Ordered for US-Chest and CBC w/diff to evaluate. The patient will be medicated with Toradol 30 mg injection for her symptoms. Patient verbalizes understanding and support with my plan of care. Differential diagnoses include but not limited to: Breast mass vs. Abscess.    9:51 PM - I discussed the patient's case and the above findings with Clinical Pharmacy who suggests Augmentin 500 mg TID on an outpatient basis for this issue. The patient will be medicated with Unasyn 4 g in  mL IV.  Patient was notified of her laboratory and ultrasound results which showed what appears to be a small abscess.  Since it has been draining at home I feel confident that we can continue to increase the drainage by hot packs, antibiotics and pain meds.  She is in agreement with this.    9:57 PM - Patient was reevaluated at bedside. Discussed lab and radiology results with the patient and informed them that she has a small abscess behind her right nipple. Encouraged the patient to use prescribed antibiotics at home, and to use warm compresses as needed. I discussed plan for discharge and follow up with her primary care provider as outlined below. Explained that if her symptoms do not resolve she may need to speak with a surgeon to get the abscess drained. The patient is stable for discharge at this time and will return for any new or worsening symptoms. Patient verbalizes understanding and support with my plan for discharge.           DISPOSITION AND DISCUSSIONS  I have discussed management of the patient with the following physicians and SORAIDA's:  None    Discussion of management with other [facility] or appropriate source(s): Pharmacy regarding the most suitable antibiotic for this patient      Escalation of care considered, and ultimately not performed: acute inpatient care management, however at this time, the patient is most appropriate for outpatient management.    Barriers to care at this time, including but not limited to: None.     Decision tools and prescription drugs considered including, but not limited to: Antibiotics Augmentin 500 mg TID .    The patient is referred to a general surgeon, Dr. Torres for further evaluation and treatment if not improved within the next 1 to 2 days.    DISPOSITION:  Patient will be discharged home in stable condition.    FOLLOW UP:  Jose Torres M.D.  [address] [place] [id number] [phone]    Schedule an appointment as soon as possible for a visit in 2 days  If not improving      OUTPATIENT MEDICATIONS:  New Prescriptions    AMOXICILLIN-CLAVULANATE (AUGMENTIN) 500-125 MG TAB    Take 1 Tablet by mouth 3 times a day. Take with food    KETOROLAC (TORADOL) 10 MG TAB    Take 1 Tablet by mouth 3 times a day as needed for Moderate Pain. With food     FINAL DIANGOSIS  1. Abscess of right breast         Eren ARCHULETA (Scribe), am scribing for, and in the presence of, Mckenzie Shaikh D.O..    Electronically signed by: Eren Aceves (Kareemibni), 2/26/2023    IMckenzie D.O. personally performed the services described in this documentation, as scribed by Eren Aceves in my presence, and it is both accurate and complete.     The note accurately reflects work and decisions made by me.  Mckenzie Shaikh D.O.  2/27/2023  1:59 AM

## 2023-02-27 NOTE — ED TRIAGE NOTES
"Abril Vance  32 y.o.  Chief Complaint   Patient presents with    Lump     Blood and discharge     Ambulatory with steady gait for above, pt reporting lump around nipple of R breast. Pt reports pain and discharge to site, reports it has been \"oozing bloody and not bloody stuff.\" A&Ox4, speaking in full sentences, NAD, placed back in lobby.  "

## 2023-02-27 NOTE — DISCHARGE INSTRUCTIONS
Apply warm moist compresses to your breast as often as necessary  Hot tub soaks in Epsom salts  Take the antibiotics until completely gone and the pain medications as needed  If you are not improving within the next 2 to 3 days please call general surgeon Dr. Torres for follow-up.

## 2023-02-27 NOTE — ED NOTES
Discharge instructions reviewed with PT. PT verbalized understanding. PT instructed to follow discharge instructions and if symptoms worsen to return to the ED. PT ambulated without assistance out of ED.

## 2023-03-13 ENCOUNTER — HOSPITAL ENCOUNTER (EMERGENCY)
Facility: MEDICAL CENTER | Age: 33
End: 2023-03-13
Attending: EMERGENCY MEDICINE
Payer: COMMERCIAL

## 2023-03-13 ENCOUNTER — APPOINTMENT (OUTPATIENT)
Dept: RADIOLOGY | Facility: MEDICAL CENTER | Age: 33
End: 2023-03-13
Attending: EMERGENCY MEDICINE
Payer: COMMERCIAL

## 2023-03-13 VITALS
TEMPERATURE: 97.9 F | HEART RATE: 68 BPM | WEIGHT: 231.7 LBS | BODY MASS INDEX: 41.05 KG/M2 | HEIGHT: 63 IN | RESPIRATION RATE: 16 BRPM | DIASTOLIC BLOOD PRESSURE: 66 MMHG | SYSTOLIC BLOOD PRESSURE: 103 MMHG | OXYGEN SATURATION: 100 %

## 2023-03-13 DIAGNOSIS — B37.31 VULVOVAGINAL CANDIDIASIS: ICD-10-CM

## 2023-03-13 DIAGNOSIS — N64.4 BREAST PAIN: ICD-10-CM

## 2023-03-13 LAB
ALBUMIN SERPL BCP-MCNC: 4.5 G/DL (ref 3.2–4.9)
ALBUMIN/GLOB SERPL: 1.4 G/DL
ALP SERPL-CCNC: 198 U/L (ref 30–99)
ALT SERPL-CCNC: 22 U/L (ref 2–50)
ANION GAP SERPL CALC-SCNC: 13 MMOL/L (ref 7–16)
APPEARANCE UR: CLEAR
AST SERPL-CCNC: 25 U/L (ref 12–45)
BACTERIA #/AREA URNS HPF: NEGATIVE /HPF
BASOPHILS # BLD AUTO: 1.2 % (ref 0–1.8)
BASOPHILS # BLD: 0.07 K/UL (ref 0–0.12)
BILIRUB SERPL-MCNC: 0.5 MG/DL (ref 0.1–1.5)
BILIRUB UR QL STRIP.AUTO: NEGATIVE
BLOOD CULTURE HOLD CXBCH: NORMAL
BUN SERPL-MCNC: 12 MG/DL (ref 8–22)
CALCIUM ALBUM COR SERPL-MCNC: 8.3 MG/DL (ref 8.5–10.5)
CALCIUM SERPL-MCNC: 8.7 MG/DL (ref 8.5–10.5)
CANDIDA DNA VAG QL PROBE+SIG AMP: NEGATIVE
CHLORIDE SERPL-SCNC: 107 MMOL/L (ref 96–112)
CO2 SERPL-SCNC: 19 MMOL/L (ref 20–33)
COLOR UR: YELLOW
CREAT SERPL-MCNC: 0.67 MG/DL (ref 0.5–1.4)
CRP SERPL HS-MCNC: <0.3 MG/DL (ref 0–0.75)
EOSINOPHIL # BLD AUTO: 0.07 K/UL (ref 0–0.51)
EOSINOPHIL NFR BLD: 1.2 % (ref 0–6.9)
EPI CELLS #/AREA URNS HPF: NEGATIVE /HPF
ERYTHROCYTE [DISTWIDTH] IN BLOOD BY AUTOMATED COUNT: 45.3 FL (ref 35.9–50)
G VAGINALIS DNA VAG QL PROBE+SIG AMP: POSITIVE
GFR SERPLBLD CREATININE-BSD FMLA CKD-EPI: 117 ML/MIN/1.73 M 2
GLOBULIN SER CALC-MCNC: 3.2 G/DL (ref 1.9–3.5)
GLUCOSE SERPL-MCNC: 80 MG/DL (ref 65–99)
GLUCOSE UR STRIP.AUTO-MCNC: NEGATIVE MG/DL
HCG SERPL QL: NEGATIVE
HCT VFR BLD AUTO: 39.3 % (ref 37–47)
HGB BLD-MCNC: 13 G/DL (ref 12–16)
HYALINE CASTS #/AREA URNS LPF: NORMAL /LPF
IMM GRANULOCYTES # BLD AUTO: 0.01 K/UL (ref 0–0.11)
IMM GRANULOCYTES NFR BLD AUTO: 0.2 % (ref 0–0.9)
KETONES UR STRIP.AUTO-MCNC: ABNORMAL MG/DL
LACTATE SERPL-SCNC: 1.1 MMOL/L (ref 0.5–2)
LEUKOCYTE ESTERASE UR QL STRIP.AUTO: ABNORMAL
LIPASE SERPL-CCNC: 39 U/L (ref 11–82)
LYMPHOCYTES # BLD AUTO: 1.67 K/UL (ref 1–4.8)
LYMPHOCYTES NFR BLD: 29.5 % (ref 22–41)
MCH RBC QN AUTO: 32.3 PG (ref 27–33)
MCHC RBC AUTO-ENTMCNC: 33.1 G/DL (ref 33.6–35)
MCV RBC AUTO: 97.8 FL (ref 81.4–97.8)
MICRO URNS: ABNORMAL
MONOCYTES # BLD AUTO: 0.41 K/UL (ref 0–0.85)
MONOCYTES NFR BLD AUTO: 7.2 % (ref 0–13.4)
NEUTROPHILS # BLD AUTO: 3.43 K/UL (ref 2–7.15)
NEUTROPHILS NFR BLD: 60.7 % (ref 44–72)
NITRITE UR QL STRIP.AUTO: NEGATIVE
NRBC # BLD AUTO: 0 K/UL
NRBC BLD-RTO: 0 /100 WBC
PH UR STRIP.AUTO: 5 [PH] (ref 5–8)
PLATELET # BLD AUTO: 289 K/UL (ref 164–446)
PMV BLD AUTO: 10.5 FL (ref 9–12.9)
POTASSIUM SERPL-SCNC: 4.4 MMOL/L (ref 3.6–5.5)
PROCALCITONIN SERPL-MCNC: <0.05 NG/ML
PROT SERPL-MCNC: 7.7 G/DL (ref 6–8.2)
PROT UR QL STRIP: NEGATIVE MG/DL
RBC # BLD AUTO: 4.02 M/UL (ref 4.2–5.4)
RBC # URNS HPF: NORMAL /HPF
RBC UR QL AUTO: NEGATIVE
SODIUM SERPL-SCNC: 139 MMOL/L (ref 135–145)
SP GR UR STRIP.AUTO: 1.02
T VAGINALIS DNA VAG QL PROBE+SIG AMP: NEGATIVE
UROBILINOGEN UR STRIP.AUTO-MCNC: 0.2 MG/DL
WBC # BLD AUTO: 5.7 K/UL (ref 4.8–10.8)
WBC #/AREA URNS HPF: NORMAL /HPF

## 2023-03-13 PROCEDURE — 87040 BLOOD CULTURE FOR BACTERIA: CPT

## 2023-03-13 PROCEDURE — 87491 CHLMYD TRACH DNA AMP PROBE: CPT

## 2023-03-13 PROCEDURE — A9270 NON-COVERED ITEM OR SERVICE: HCPCS | Performed by: EMERGENCY MEDICINE

## 2023-03-13 PROCEDURE — 76604 US EXAM CHEST: CPT

## 2023-03-13 PROCEDURE — 84703 CHORIONIC GONADOTROPIN ASSAY: CPT

## 2023-03-13 PROCEDURE — 80053 COMPREHEN METABOLIC PANEL: CPT

## 2023-03-13 PROCEDURE — 83605 ASSAY OF LACTIC ACID: CPT

## 2023-03-13 PROCEDURE — 87591 N.GONORRHOEAE DNA AMP PROB: CPT

## 2023-03-13 PROCEDURE — 700102 HCHG RX REV CODE 250 W/ 637 OVERRIDE(OP): Performed by: EMERGENCY MEDICINE

## 2023-03-13 PROCEDURE — 700111 HCHG RX REV CODE 636 W/ 250 OVERRIDE (IP): Performed by: EMERGENCY MEDICINE

## 2023-03-13 PROCEDURE — 85025 COMPLETE CBC W/AUTO DIFF WBC: CPT

## 2023-03-13 PROCEDURE — 87660 TRICHOMONAS VAGIN DIR PROBE: CPT

## 2023-03-13 PROCEDURE — 96374 THER/PROPH/DIAG INJ IV PUSH: CPT

## 2023-03-13 PROCEDURE — 83690 ASSAY OF LIPASE: CPT

## 2023-03-13 PROCEDURE — 86140 C-REACTIVE PROTEIN: CPT

## 2023-03-13 PROCEDURE — 87480 CANDIDA DNA DIR PROBE: CPT

## 2023-03-13 PROCEDURE — 87510 GARDNER VAG DNA DIR PROBE: CPT

## 2023-03-13 PROCEDURE — 36415 COLL VENOUS BLD VENIPUNCTURE: CPT

## 2023-03-13 PROCEDURE — 99284 EMERGENCY DEPT VISIT MOD MDM: CPT

## 2023-03-13 PROCEDURE — 81001 URINALYSIS AUTO W/SCOPE: CPT

## 2023-03-13 PROCEDURE — 84145 PROCALCITONIN (PCT): CPT

## 2023-03-13 RX ORDER — IBUPROFEN 600 MG/1
600 TABLET ORAL EVERY 6 HOURS PRN
Qty: 20 TABLET | Refills: 0 | Status: SHIPPED | OUTPATIENT
Start: 2023-03-13 | End: 2023-03-18

## 2023-03-13 RX ORDER — KETOROLAC TROMETHAMINE 30 MG/ML
15 INJECTION, SOLUTION INTRAMUSCULAR; INTRAVENOUS ONCE
Status: COMPLETED | OUTPATIENT
Start: 2023-03-13 | End: 2023-03-13

## 2023-03-13 RX ORDER — FLUCONAZOLE 150 MG/1
150 TABLET ORAL DAILY
Qty: 1 TABLET | Refills: 0 | Status: ACTIVE | OUTPATIENT
Start: 2023-03-13 | End: 2023-03-14

## 2023-03-13 RX ADMIN — KETOROLAC TROMETHAMINE 15 MG: 30 INJECTION, SOLUTION INTRAMUSCULAR at 14:45

## 2023-03-13 RX ADMIN — FLUCONAZOLE 150 MG: 50 TABLET ORAL at 15:03

## 2023-03-13 ASSESSMENT — FIBROSIS 4 INDEX: FIB4 SCORE: 0.84

## 2023-03-13 NOTE — DISCHARGE INSTRUCTIONS
Your test including ultrasound and blood test show no recurrent infection in your breast.  You do however have a yeast infection as we discussed.  You have been given a dose of medication for this.  Usually this only takes 1 dose however it does stay in your system for 3 days and if you are still having symptoms in 3 days please take the additional dose that you have been prescribed.  If it persist longer than this please follow-up with your primary care doctor.  
No

## 2023-03-13 NOTE — Clinical Note
Abril Vance was seen and treated in our emergency department on 3/13/2023.  She may return to work on 03/15/2023.       If you have any questions or concerns, please don't hesitate to call.      Jose Ortiz M.D.

## 2023-03-13 NOTE — ED TRIAGE NOTES
"Chief Complaint   Patient presents with    Abscess     Seen here two week ago and diagnoses with R breast abscess, not drained, pt dc'd from ER with abx rx, finished abx regimen, pt states abscess has not changed in size but has gotten more painful, denies drainage, fever of 102.9F two days ago    Painful Urination     X 5 days, pain with urination     Pt ambulatory to triage for above complaints, VSS on RA, GCS 15, NAD.    Pt returned to [facility]. Educated on triage process and to inform staff of any changes.     /79   Pulse 70   Temp 36.7 °C (98 °F) (Temporal)   Resp 16   Ht 1.6 m (5' 3\")   Wt 105 kg (231 lb 11.3 oz)   LMP 09/20/2018 (Approximate)   SpO2 100%   BMI 41.04 kg/m²     "

## 2023-03-13 NOTE — ED PROVIDER NOTES
ED Provider Note    CHIEF COMPLAINT  Chief Complaint   Patient presents with    Abscess     Seen here two week ago and diagnoses with R breast abscess, not drained, pt dc'd from ER with abx rx, finished abx regimen, pt states abscess has not changed in size but has gotten more painful, denies drainage, fever of 102.9F two days ago    Painful Urination     X 5 days, pain with urination       EXTERNAL RECORDS REVIEWED  Outpatient Notes primarily orthopedics visits but an emergency department visit on February 26 noted for fever and breast lump, unremarkable white blood cell count, ultrasound showing small abscess, given Toradol, discharged with Augmentin    HPI/ROS  LIMITATION TO HISTORY   Select: : None  OUTSIDE HISTORIAN(S):  none    Abril Vance is a 32 y.o. female who presents with 2 complaints.  She reports that she has had a lump over her right breast for around 1 month, was seen here diagnosed with abscess, and started on antibiotics.  She reports that it was draining but it stopped draining now and does still have some pain in that area.  She states some fever to 102.9 a few days ago, none since, as well as some overall fatigue.  She reports no cough, congestion or other chest pain.  No nausea or vomiting or diarrhea or abdominal pain.  She also reports pain with urination over the last few days after starting antibiotic as well as some whitish discharge and irritation.  No flank pain.    PAST MEDICAL HISTORY   has a past medical history of Arthritis, Gallstones, History of anemia, Migraines (4/5/2010), Morbid obesity (HCC) (4/5/2010), Sleep apnea, Snoring, and Vaginal trichomoniasis (4/5/2010).    SURGICAL HISTORY   has a past surgical history that includes lauren by laparoscopy (10/14/2010); other (2016); vaginal hysterectomy scope total (9/25/2018); salpingectomy (Bilateral, 9/25/2018); anterior and posterior repair (9/25/2018); enterocele repair (9/25/2018); bladder sling female (9/25/2018); vaginal  "suspension (9/25/2018); unlisted proc, arthroscopy (Left, 1/2/2023); repair collat ankle ligmnt,secondary (Left, 1/2/2023); and release tib/fib/ankle flex tendon,ea (Left, 1/2/2023).    FAMILY HISTORY  Family History   Family history unknown: Yes       SOCIAL HISTORY  Social History     Tobacco Use    Smoking status: Never    Smokeless tobacco: Never   Vaping Use    Vaping Use: Never used   Substance and Sexual Activity    Alcohol use: Yes     Comment: occasionally    Drug use: No    Sexual activity: Not on file       CURRENT MEDICATIONS  Home Medications       Reviewed by Nando Sanders R.N. (Registered Nurse) on 03/13/23 at 1145  Med List Status: Not Addressed     Medication Last Dose Status   acetaminophen (TYLENOL) 500 MG Tab  Active   acetaminophen (TYLENOL) 500 MG Tab  Active   amoxicillin-clavulanate (AUGMENTIN) 500-125 MG Tab  Active   gabapentin (NEURONTIN) 300 MG Cap  Active   gabapentin (NEURONTIN) 300 MG Cap  Active   hydrOXYzine pamoate (VISTARIL) 50 MG Cap  Active   ibuprofen (MOTRIN) 600 MG Tab  Active   IRON PO  Active   ketorolac (TORADOL) 10 MG Tab  Active   lidocaine (LIDODERM) 5 % Patch  Active   meloxicam (MOBIC) 15 MG tablet  Active   methocarbamol (ROBAXIN) 500 MG Tab  Active   metoclopramide (REGLAN) 10 MG Tab  Active   ondansetron (ZOFRAN ODT) 4 MG TABLET DISPERSIBLE  Active   ondansetron (ZOFRAN ODT) 4 MG TABLET DISPERSIBLE  Active   phentermine (ADIPEX-P) 37.5 MG tablet  Active                    ALLERGIES  Allergies   Allergen Reactions    Nkda [No Known Drug Allergy]        PHYSICAL EXAM  VITAL SIGNS: /66   Pulse 68   Temp 36.6 °C (97.9 °F) (Temporal)   Resp 16   Ht 1.6 m (5' 3\")   Wt 105 kg (231 lb 11.3 oz)   LMP 09/20/2018 (Approximate)   SpO2 100%   BMI 41.04 kg/m²      Pulse ox interpretation: I interpret this pulse ox as normal.  Constitutional: Alert in no apparent distress.  HENT: No signs of trauma, Bilateral external ears normal, Nose normal.   Eyes: Pupils are " equal and reactive, Conjunctiva normal, Non-icteric.   Neck: Normal range of motion, No tenderness, Supple, No stridor.   Cardiovascular: Regular rate and rhythm, no murmurs.   Thorax & Lungs: Normal breath sounds, No respiratory distress, No wheezing, over the right breast there is no erythema, no obvious swelling, no drainage, on palpation with nurse chaperone, there is some central and right sided tenderness although no masses felt  Abdomen: Bowel sounds normal, Soft, No tenderness, No masses, No pulsatile masses. No peritoneal signs.  : With nurse chaperone, noted to have white discharge and irritation with some associated tenderness and on speculum exam similar in the vaginal vault with no CMT or adnexal mass, no other discharge  Skin: Warm, Dry, No erythema, No rash.   Back: No bony tenderness, No CVA tenderness.   Extremities: Intact distal pulses, No edema, No tenderness, No cyanosis,  Negative Caio's sign.   Musculoskeletal: Good range of motion in all major joints. No tenderness to palpation or major deformities noted.   Neurologic: Alert , Normal motor function, Normal sensory function, No focal deficits noted.   Psychiatric: Affect normal, Judgment normal, Mood normal.               DIAGNOSTIC STUDIES / PROCEDURES  Labs Reviewed   URINALYSIS,CULTURE IF INDICATED - Abnormal; Notable for the following components:       Result Value    Ketones Trace (*)     Leukocyte Esterase Trace (*)     All other components within normal limits    Narrative:     Indication for culture:->Patient WITHOUT an indwelling Cabral  catheter in place with new onset of Dysuria, Frequency,  Urgency, and/or Suprapubic pain   CBC WITH DIFFERENTIAL - Abnormal; Notable for the following components:    RBC 4.02 (*)     MCHC 33.1 (*)     All other components within normal limits   COMP METABOLIC PANEL - Abnormal; Notable for the following components:    Co2 19 (*)     Alkaline Phosphatase 198 (*)     All other components within normal  "limits   CORRECTED CALCIUM - Abnormal; Notable for the following components:    Correct Calcium 8.3 (*)     All other components within normal limits   HCG QUAL SERUM   URINE MICROSCOPIC (W/UA)    Narrative:     Indication for culture:->Patient WITHOUT an indwelling Cabral  catheter in place with new onset of Dysuria, Frequency,  Urgency, and/or Suprapubic pain   LIPASE   LACTIC ACID   CRP QUANTITIVE (NON-CARDIAC)   PROCALCITONIN   ESTIMATED GFR   LACTIC ACID   LACTIC ACID   BLOOD CULTURE    Narrative:     1 of 2 for Blood Culture x 2 sites order. Per Hospital  Policy: Only change Specimen Src: to \"Line\" if specified by  physician order.   BLOOD CULTURE    Narrative:     2 of 2 blood culture x2  Sites order. Per Hospital Policy:  Only change Specimen Src: to \"Line\" if specified by physician  order.   WET PREP   CHLAMYDIA/GC, PCR (URINE)         RADIOLOGY  I have independently interpreted the diagnostic imaging associated with this visit and am waiting the final reading from the radiologist.   My preliminary interpretation is as follows: No obvious fluid collection  Radiologist interpretation:   US-CHEST   Final Result         Interval resolution of right-sided breast mass.      No evidence of malignancy within the right breast.      BI-RADS 2: Benign findings.      RECOMMENDATION: Routine annual screening mammography is recommended when the patient reaches the age of 40 years old.            COURSE & MEDICAL DECISION MAKING    ED Observation Status? Yes; I am placing the patient in to an observation status due to a diagnostic uncertainty as well as therapeutic intensity. Patient placed in observation status at 1:24 PM, 3/13/2023.     Observation plan is as follows: IV fluids, pain control, diagnostic evaluation as below    Upon Reevaluation, the patient's condition has: Improved; and will be discharged.    Patient discharged from ED Observation status at 3:52 PM   (Time) 03/13/23   (Date).     INITIAL ASSESSMENT, " COURSE AND PLAN  Care Narrative: .1:17 PM  Patient presenting with fever and breast pain, at this point differential includes sepsis with potential soft tissue infection/abscess requiring surgery, breast mass, urinary tract infection, yeast infection.  I have ordered for sepsis bundle, IV fluids, Toradol    1:35 PM  After pelvic exam patient does have clinical yeast infection we will treat with fluconazole    3:52 PM  Patient reevaluated, updated on all results, she is comfortable, agreeable to discharge    HYDRATION: Based on the patient's presentation of Inability to take oral fluids the patient was given IV fluids. IV Hydration was used because oral hydration was not as rapid as required. Upon recheck following hydration, the patient was improved.      ADDITIONAL PROBLEM LIST    #1 Candidal vulvovaginitis--likely secondary to recent antibiotic use, she is given a dose of fluconazole here, prescription as needed for repeat dose if needed in 3 days.  No clinical findings to suggest STI, tests are pending for pharmacy follow-up, no evidence of urinary tract infection    #2 breast pain, recent abscess although this appears to have healed well at this time, no indication for drainage or additional antibiotics, will monitor for any new lumps or masses or concerns and follow-up with your primary care      DISPOSITION AND DISCUSSIONS    Barriers to care at this time, including but not limited to:  None patient does have primary care .     Decision tools and prescription drugs considered including, but not limited to: Antifungal prescription for Diflucan, pain medication prescription for 600mg  Motrin    The patient will return for new or worsening symptoms and is stable at the time of discharge.    The patient is referred to a primary physician for blood pressure management, diabetic screening, and for all other preventative health concerns.        DISPOSITION:  Patient will be discharged home in stable  condition.    FOLLOW UP:  Marleny Faustin M.D.  [address] [facility] [address]  [phone]    In 1 week  As needed      OUTPATIENT MEDICATIONS:  New Prescriptions    FLUCONAZOLE (DIFLUCAN) 150 MG TABLET    Take 1 Tablet by mouth every day for 1 day.    IBUPROFEN (MOTRIN) 600 MG TAB    Take 1 Tablet by mouth every 6 hours as needed for Moderate Pain for up to 5 days.         FINAL DIAGNOSIS  1. Vulvovaginal candidiasis    2. Breast pain           Electronically signed by: Jose Ortiz M.D., 3/13/2023 1:16 PM

## 2023-03-14 LAB
C TRACH DNA SPEC QL NAA+PROBE: NEGATIVE
N GONORRHOEA DNA SPEC QL NAA+PROBE: NEGATIVE
SPECIMEN SOURCE: NORMAL

## 2023-03-18 LAB
BACTERIA BLD CULT: NORMAL
BACTERIA BLD CULT: NORMAL
SIGNIFICANT IND 70042: NORMAL
SIGNIFICANT IND 70042: NORMAL
SITE SITE: NORMAL
SITE SITE: NORMAL
SOURCE SOURCE: NORMAL
SOURCE SOURCE: NORMAL

## 2023-03-19 RX ORDER — METRONIDAZOLE 500 MG/1
500 TABLET ORAL 2 TIMES DAILY
Qty: 14 TABLET | Refills: 0 | Status: ACTIVE | OUTPATIENT
Start: 2023-03-19 | End: 2023-03-26

## 2023-03-19 NOTE — ED NOTES
"ED Positive Culture Follow-up/Notification Note:    Date: 3/19/23     Patient seen in the ED on 3/13/2023 for lump over right breast x 1 month with abscess treated with antibiotics. Temp 102.9 at home. Also reports having pain with urination and whitish discharge and irritation after completing the antibiotic.   1. Vulvovaginal candidiasis    2. Breast pain       Discharge Medication List as of 3/13/2023  3:57 PM        START taking these medications    Details   fluconazole (DIFLUCAN) 150 MG tablet Take 1 Tablet by mouth every day for 1 day., Disp-1 Tablet, R-0, Normal      !! ibuprofen (MOTRIN) 600 MG Tab Take 1 Tablet by mouth every 6 hours as needed for Moderate Pain for up to 5 days., Disp-20 Tablet, R-0, Normal       !! - Potential duplicate medications found. Please discuss with provider.          Allergies: Nkda [no known drug allergy]     Vitals:    03/13/23 1138 03/13/23 1141 03/13/23 1546   BP: 117/79  103/66   Pulse: 70  68   Resp: 16  16   Temp: 36.7 °C (98 °F)  36.6 °C (97.9 °F)   TempSrc: Temporal  Temporal   SpO2: 100%  100%   Weight:  105 kg (231 lb 11.3 oz)    Height: 1.6 m (5' 3\")         Final cultures:   Results       Procedure Component Value Units Date/Time    Blood Culture [[id number]] Collected: 03/13/23 1543    Order Status: Completed Specimen: Blood from Peripheral Updated: 03/18/23 1700     Significant Indicator NEG     Source BLD     Site PERIPHERAL     Culture Result No growth after 5 days of incubation.    Narrative:      2 of 2 blood culture x2  Sites order. Per Hospital Policy:  Only change Specimen Src: to \"Line\" if specified by physician  order.  Right Hand    Blood Culture [[id number]] Collected: 03/13/23 1409    Order Status: Completed Specimen: Blood from Peripheral Updated: 03/18/23 1500     Significant Indicator NEG     Source BLD     Site PERIPHERAL     Culture Result No growth after 5 days of incubation.    Narrative:      1 of 2 for Blood Culture x 2 sites order. Per " "Hospital  Policy: Only change Specimen Src: to \"Line\" if specified by  physician order.  Right Hand    Chlamydia/GC, PCR (Urine) [[id number]] Collected: 03/13/23 1655    Order Status: Completed Specimen: Genital Updated: 03/14/23 2042     C. trachomatis by PCR Negative     Gc By Dna Probe Negative     Source Endo/Cervical    Blood Culture,Hold [[id number]] Collected: 03/13/23 1543    Order Status: Completed Updated: 03/13/23 1756     Blood Culture Hold Collected    WET PREP [[id number]] Collected: 03/13/23 1655    Order Status: Canceled Specimen: Vaginal     Urinalysis, Culture if Indicated [[id number]]  (Abnormal) Collected: 03/13/23 1155    Order Status: Completed Specimen: Urine Updated: 03/13/23 1218     Color Yellow     Character Clear     Specific Gravity 1.022     Ph 5.0     Glucose Negative mg/dL      Ketones Trace mg/dL      Protein Negative mg/dL      Bilirubin Negative     Urobilinogen, Urine 0.2     Nitrite Negative     Leukocyte Esterase Trace     Occult Blood Negative     Micro Urine Req Microscopic    Narrative:      Indication for culture:->Patient WITHOUT an indwelling Cabral  catheter in place with new onset of Dysuria, Frequency,  Urgency, and/or Suprapubic pain           03/13/23 16:58   Candida species DNA Probe Negative   Gardnerella vaginalis DNA Probe POSITIVE !   Trichamonas vaginalis DNA Probe Negative   !: Data is abnormal    Plan:   Patient has not been treated for bacterial vaginosis. I have informed the patient of the result and the need for treatment. She will  the new prescription for metronidazole 500 mg po BID X 7 days at the [facility].      Aide Issa, PharmD    "

## 2023-04-19 ENCOUNTER — APPOINTMENT (OUTPATIENT)
Dept: RADIOLOGY | Facility: MEDICAL CENTER | Age: 33
End: 2023-04-19
Attending: EMERGENCY MEDICINE
Payer: COMMERCIAL

## 2023-04-19 ENCOUNTER — HOSPITAL ENCOUNTER (OUTPATIENT)
Facility: MEDICAL CENTER | Age: 33
End: 2023-04-20
Attending: EMERGENCY MEDICINE | Admitting: HOSPITALIST
Payer: COMMERCIAL

## 2023-04-19 DIAGNOSIS — Z86.69 HX OF MIGRAINES: ICD-10-CM

## 2023-04-19 DIAGNOSIS — H92.02 LEFT EAR PAIN: ICD-10-CM

## 2023-04-19 DIAGNOSIS — R29.90 STROKE-LIKE SYMPTOM: ICD-10-CM

## 2023-04-19 PROBLEM — R11.2 NAUSEA & VOMITING: Status: ACTIVE | Noted: 2023-04-19

## 2023-04-19 PROBLEM — H92.09 EAR PAIN: Status: ACTIVE | Noted: 2023-04-19

## 2023-04-19 PROBLEM — J02.9 SORE THROAT: Status: ACTIVE | Noted: 2023-04-19

## 2023-04-19 LAB
ALBUMIN SERPL BCP-MCNC: 4.2 G/DL (ref 3.2–4.9)
ALBUMIN/GLOB SERPL: 1.3 G/DL
ALP SERPL-CCNC: 157 U/L (ref 30–99)
ALT SERPL-CCNC: 28 U/L (ref 2–50)
ANION GAP SERPL CALC-SCNC: 12 MMOL/L (ref 7–16)
AST SERPL-CCNC: 32 U/L (ref 12–45)
BASOPHILS # BLD AUTO: 1.5 % (ref 0–1.8)
BASOPHILS # BLD: 0.07 K/UL (ref 0–0.12)
BILIRUB SERPL-MCNC: 0.4 MG/DL (ref 0.1–1.5)
BUN SERPL-MCNC: 7 MG/DL (ref 8–22)
CALCIUM ALBUM COR SERPL-MCNC: 8.5 MG/DL (ref 8.5–10.5)
CALCIUM SERPL-MCNC: 8.7 MG/DL (ref 8.5–10.5)
CHLORIDE SERPL-SCNC: 106 MMOL/L (ref 96–112)
CO2 SERPL-SCNC: 20 MMOL/L (ref 20–33)
CREAT SERPL-MCNC: 0.53 MG/DL (ref 0.5–1.4)
EKG IMPRESSION: NORMAL
EOSINOPHIL # BLD AUTO: 0.1 K/UL (ref 0–0.51)
EOSINOPHIL NFR BLD: 2.2 % (ref 0–6.9)
ERYTHROCYTE [DISTWIDTH] IN BLOOD BY AUTOMATED COUNT: 50 FL (ref 35.9–50)
FLUAV RNA SPEC QL NAA+PROBE: NEGATIVE
FLUBV RNA SPEC QL NAA+PROBE: NEGATIVE
GFR SERPLBLD CREATININE-BSD FMLA CKD-EPI: 124 ML/MIN/1.73 M 2
GLOBULIN SER CALC-MCNC: 3.3 G/DL (ref 1.9–3.5)
GLUCOSE SERPL-MCNC: 86 MG/DL (ref 65–99)
HCG SERPL QL: NEGATIVE
HCT VFR BLD AUTO: 38.2 % (ref 37–47)
HGB BLD-MCNC: 12.7 G/DL (ref 12–16)
IMM GRANULOCYTES # BLD AUTO: 0.01 K/UL (ref 0–0.11)
IMM GRANULOCYTES NFR BLD AUTO: 0.2 % (ref 0–0.9)
INR PPP: 1.01 (ref 0.87–1.13)
LYMPHOCYTES # BLD AUTO: 1.52 K/UL (ref 1–4.8)
LYMPHOCYTES NFR BLD: 32.8 % (ref 22–41)
MCH RBC QN AUTO: 32.7 PG (ref 27–33)
MCHC RBC AUTO-ENTMCNC: 33.2 G/DL (ref 33.6–35)
MCV RBC AUTO: 98.5 FL (ref 81.4–97.8)
MONOCYTES # BLD AUTO: 0.48 K/UL (ref 0–0.85)
MONOCYTES NFR BLD AUTO: 10.4 % (ref 0–13.4)
NEUTROPHILS # BLD AUTO: 2.45 K/UL (ref 2–7.15)
NEUTROPHILS NFR BLD: 52.9 % (ref 44–72)
NRBC # BLD AUTO: 0 K/UL
NRBC BLD-RTO: 0 /100 WBC
PLATELET # BLD AUTO: 282 K/UL (ref 164–446)
PMV BLD AUTO: 10.3 FL (ref 9–12.9)
POTASSIUM SERPL-SCNC: 4 MMOL/L (ref 3.6–5.5)
PROT SERPL-MCNC: 7.5 G/DL (ref 6–8.2)
PROTHROMBIN TIME: 13.2 SEC (ref 12–14.6)
RBC # BLD AUTO: 3.88 M/UL (ref 4.2–5.4)
RSV RNA SPEC QL NAA+PROBE: NEGATIVE
SARS-COV-2 RNA RESP QL NAA+PROBE: NOTDETECTED
SODIUM SERPL-SCNC: 138 MMOL/L (ref 135–145)
SPECIMEN SOURCE: NORMAL
WBC # BLD AUTO: 4.6 K/UL (ref 4.8–10.8)

## 2023-04-19 PROCEDURE — A9270 NON-COVERED ITEM OR SERVICE: HCPCS

## 2023-04-19 PROCEDURE — G0378 HOSPITAL OBSERVATION PER HR: HCPCS

## 2023-04-19 PROCEDURE — 80053 COMPREHEN METABOLIC PANEL: CPT

## 2023-04-19 PROCEDURE — 96375 TX/PRO/DX INJ NEW DRUG ADDON: CPT

## 2023-04-19 PROCEDURE — 85025 COMPLETE CBC W/AUTO DIFF WBC: CPT

## 2023-04-19 PROCEDURE — 700117 HCHG RX CONTRAST REV CODE 255: Performed by: EMERGENCY MEDICINE

## 2023-04-19 PROCEDURE — 99223 1ST HOSP IP/OBS HIGH 75: CPT

## 2023-04-19 PROCEDURE — 99244 OFF/OP CNSLTJ NEW/EST MOD 40: CPT | Mod: FS | Performed by: NURSE PRACTITIONER

## 2023-04-19 PROCEDURE — 96374 THER/PROPH/DIAG INJ IV PUSH: CPT

## 2023-04-19 PROCEDURE — 700105 HCHG RX REV CODE 258

## 2023-04-19 PROCEDURE — 93005 ELECTROCARDIOGRAM TRACING: CPT

## 2023-04-19 PROCEDURE — 700102 HCHG RX REV CODE 250 W/ 637 OVERRIDE(OP)

## 2023-04-19 PROCEDURE — 96372 THER/PROPH/DIAG INJ SC/IM: CPT | Mod: XU

## 2023-04-19 PROCEDURE — 70498 CT ANGIOGRAPHY NECK: CPT

## 2023-04-19 PROCEDURE — 70450 CT HEAD/BRAIN W/O DYE: CPT

## 2023-04-19 PROCEDURE — 93005 ELECTROCARDIOGRAM TRACING: CPT | Performed by: EMERGENCY MEDICINE

## 2023-04-19 PROCEDURE — 700111 HCHG RX REV CODE 636 W/ 250 OVERRIDE (IP)

## 2023-04-19 PROCEDURE — C9803 HOPD COVID-19 SPEC COLLECT: HCPCS

## 2023-04-19 PROCEDURE — 85610 PROTHROMBIN TIME: CPT

## 2023-04-19 PROCEDURE — 99285 EMERGENCY DEPT VISIT HI MDM: CPT

## 2023-04-19 PROCEDURE — 700111 HCHG RX REV CODE 636 W/ 250 OVERRIDE (IP): Performed by: EMERGENCY MEDICINE

## 2023-04-19 PROCEDURE — 84703 CHORIONIC GONADOTROPIN ASSAY: CPT

## 2023-04-19 PROCEDURE — 36415 COLL VENOUS BLD VENIPUNCTURE: CPT

## 2023-04-19 PROCEDURE — 0241U HCHG SARS-COV-2 COVID-19 NFCT DS RESP RNA 4 TRGT MIC: CPT

## 2023-04-19 PROCEDURE — 70496 CT ANGIOGRAPHY HEAD: CPT

## 2023-04-19 RX ORDER — PROMETHAZINE HYDROCHLORIDE 25 MG/1
12.5-25 TABLET ORAL EVERY 4 HOURS PRN
Status: DISCONTINUED | OUTPATIENT
Start: 2023-04-19 | End: 2023-04-20 | Stop reason: HOSPADM

## 2023-04-19 RX ORDER — ACETAMINOPHEN 325 MG/1
650 TABLET ORAL EVERY 6 HOURS PRN
Status: DISCONTINUED | OUTPATIENT
Start: 2023-04-19 | End: 2023-04-20 | Stop reason: HOSPADM

## 2023-04-19 RX ORDER — ENOXAPARIN SODIUM 100 MG/ML
40 INJECTION SUBCUTANEOUS DAILY
Status: DISCONTINUED | OUTPATIENT
Start: 2023-04-19 | End: 2023-04-20 | Stop reason: HOSPADM

## 2023-04-19 RX ORDER — KETOROLAC TROMETHAMINE 30 MG/ML
15 INJECTION, SOLUTION INTRAMUSCULAR; INTRAVENOUS EVERY 6 HOURS PRN
Status: DISCONTINUED | OUTPATIENT
Start: 2023-04-19 | End: 2023-04-20 | Stop reason: HOSPADM

## 2023-04-19 RX ORDER — PROMETHAZINE HYDROCHLORIDE 25 MG/1
12.5-25 SUPPOSITORY RECTAL EVERY 4 HOURS PRN
Status: DISCONTINUED | OUTPATIENT
Start: 2023-04-19 | End: 2023-04-20 | Stop reason: HOSPADM

## 2023-04-19 RX ORDER — AMOXICILLIN 250 MG
2 CAPSULE ORAL 2 TIMES DAILY
Status: DISCONTINUED | OUTPATIENT
Start: 2023-04-19 | End: 2023-04-20 | Stop reason: HOSPADM

## 2023-04-19 RX ORDER — POLYETHYLENE GLYCOL 3350 17 G/17G
1 POWDER, FOR SOLUTION ORAL
Status: DISCONTINUED | OUTPATIENT
Start: 2023-04-19 | End: 2023-04-20 | Stop reason: HOSPADM

## 2023-04-19 RX ORDER — OXYCODONE HYDROCHLORIDE 5 MG/1
2.5 TABLET ORAL
Status: DISCONTINUED | OUTPATIENT
Start: 2023-04-19 | End: 2023-04-19

## 2023-04-19 RX ORDER — ONDANSETRON 4 MG/1
4 TABLET, ORALLY DISINTEGRATING ORAL EVERY 4 HOURS PRN
Status: DISCONTINUED | OUTPATIENT
Start: 2023-04-19 | End: 2023-04-20 | Stop reason: HOSPADM

## 2023-04-19 RX ORDER — BISACODYL 10 MG
10 SUPPOSITORY, RECTAL RECTAL
Status: DISCONTINUED | OUTPATIENT
Start: 2023-04-19 | End: 2023-04-20 | Stop reason: HOSPADM

## 2023-04-19 RX ORDER — OXYCODONE HYDROCHLORIDE 5 MG/1
5 TABLET ORAL
Status: DISCONTINUED | OUTPATIENT
Start: 2023-04-19 | End: 2023-04-19

## 2023-04-19 RX ORDER — HYDROMORPHONE HYDROCHLORIDE 1 MG/ML
0.25 INJECTION, SOLUTION INTRAMUSCULAR; INTRAVENOUS; SUBCUTANEOUS
Status: DISCONTINUED | OUTPATIENT
Start: 2023-04-19 | End: 2023-04-19

## 2023-04-19 RX ORDER — OXYCODONE HYDROCHLORIDE 10 MG/1
10 TABLET ORAL
Status: DISCONTINUED | OUTPATIENT
Start: 2023-04-19 | End: 2023-04-20 | Stop reason: HOSPADM

## 2023-04-19 RX ORDER — DIPHENHYDRAMINE HYDROCHLORIDE 50 MG/ML
25 INJECTION INTRAMUSCULAR; INTRAVENOUS ONCE
Status: COMPLETED | OUTPATIENT
Start: 2023-04-19 | End: 2023-04-19

## 2023-04-19 RX ORDER — SODIUM CHLORIDE, SODIUM LACTATE, POTASSIUM CHLORIDE, CALCIUM CHLORIDE 600; 310; 30; 20 MG/100ML; MG/100ML; MG/100ML; MG/100ML
INJECTION, SOLUTION INTRAVENOUS CONTINUOUS
Status: DISCONTINUED | OUTPATIENT
Start: 2023-04-19 | End: 2023-04-20 | Stop reason: HOSPADM

## 2023-04-19 RX ORDER — PROCHLORPERAZINE EDISYLATE 5 MG/ML
10 INJECTION INTRAMUSCULAR; INTRAVENOUS ONCE
Status: COMPLETED | OUTPATIENT
Start: 2023-04-19 | End: 2023-04-19

## 2023-04-19 RX ORDER — OXYCODONE HYDROCHLORIDE 5 MG/1
5 TABLET ORAL
Status: DISCONTINUED | OUTPATIENT
Start: 2023-04-19 | End: 2023-04-20 | Stop reason: HOSPADM

## 2023-04-19 RX ORDER — HYDROMORPHONE HYDROCHLORIDE 1 MG/ML
0.5 INJECTION, SOLUTION INTRAMUSCULAR; INTRAVENOUS; SUBCUTANEOUS
Status: DISCONTINUED | OUTPATIENT
Start: 2023-04-19 | End: 2023-04-20

## 2023-04-19 RX ORDER — PROCHLORPERAZINE EDISYLATE 5 MG/ML
5-10 INJECTION INTRAMUSCULAR; INTRAVENOUS EVERY 4 HOURS PRN
Status: DISCONTINUED | OUTPATIENT
Start: 2023-04-19 | End: 2023-04-20 | Stop reason: HOSPADM

## 2023-04-19 RX ORDER — ONDANSETRON 2 MG/ML
4 INJECTION INTRAMUSCULAR; INTRAVENOUS EVERY 4 HOURS PRN
Status: DISCONTINUED | OUTPATIENT
Start: 2023-04-19 | End: 2023-04-20 | Stop reason: HOSPADM

## 2023-04-19 RX ADMIN — PROCHLORPERAZINE EDISYLATE 10 MG: 5 INJECTION INTRAMUSCULAR; INTRAVENOUS at 17:57

## 2023-04-19 RX ADMIN — SENNOSIDES AND DOCUSATE SODIUM 2 TABLET: 50; 8.6 TABLET ORAL at 18:48

## 2023-04-19 RX ADMIN — DIPHENHYDRAMINE HYDROCHLORIDE 25 MG: 50 INJECTION, SOLUTION INTRAMUSCULAR; INTRAVENOUS at 18:02

## 2023-04-19 RX ADMIN — KETOROLAC TROMETHAMINE 15 MG: 30 INJECTION, SOLUTION INTRAMUSCULAR; INTRAVENOUS at 18:47

## 2023-04-19 RX ADMIN — ENOXAPARIN SODIUM 40 MG: 40 INJECTION SUBCUTANEOUS at 18:47

## 2023-04-19 RX ADMIN — IOHEXOL 80 ML: 350 INJECTION, SOLUTION INTRAVENOUS at 16:52

## 2023-04-19 RX ADMIN — SODIUM CHLORIDE, POTASSIUM CHLORIDE, SODIUM LACTATE AND CALCIUM CHLORIDE: 600; 310; 30; 20 INJECTION, SOLUTION INTRAVENOUS at 18:15

## 2023-04-19 ASSESSMENT — LIFESTYLE VARIABLES
TOTAL SCORE: 0
CONSUMPTION TOTAL: NEGATIVE
TOTAL SCORE: 0
EVER HAD A DRINK FIRST THING IN THE MORNING TO STEADY YOUR NERVES TO GET RID OF A HANGOVER: NO
EVER FELT BAD OR GUILTY ABOUT YOUR DRINKING: NO
HAVE PEOPLE ANNOYED YOU BY CRITICIZING YOUR DRINKING: NO
TOTAL SCORE: 0
HAVE YOU EVER FELT YOU SHOULD CUT DOWN ON YOUR DRINKING: NO
DO YOU DRINK ALCOHOL: NO
ALCOHOL_USE: NO
HOW MANY TIMES IN THE PAST YEAR HAVE YOU HAD 5 OR MORE DRINKS IN A DAY: 0
AVERAGE NUMBER OF DAYS PER WEEK YOU HAVE A DRINK CONTAINING ALCOHOL: 0
DOES PATIENT WANT TO STOP DRINKING: NO
ON A TYPICAL DAY WHEN YOU DRINK ALCOHOL HOW MANY DRINKS DO YOU HAVE: 0

## 2023-04-19 ASSESSMENT — PAIN DESCRIPTION - PAIN TYPE
TYPE: ACUTE PAIN

## 2023-04-19 ASSESSMENT — ENCOUNTER SYMPTOMS
COUGH: 0
DIZZINESS: 0
WEAKNESS: 1
VOMITING: 1
FEVER: 0
CHILLS: 0
ABDOMINAL PAIN: 0
PALPITATIONS: 0
SORE THROAT: 1
DIARRHEA: 0
FOCAL WEAKNESS: 1
HEADACHES: 1
SHORTNESS OF BREATH: 0
HEARTBURN: 0
TINGLING: 1
NAUSEA: 1
SENSORY CHANGE: 1

## 2023-04-19 ASSESSMENT — PATIENT HEALTH QUESTIONNAIRE - PHQ9
SUM OF ALL RESPONSES TO PHQ9 QUESTIONS 1 AND 2: 0
2. FEELING DOWN, DEPRESSED, IRRITABLE, OR HOPELESS: NOT AT ALL
1. LITTLE INTEREST OR PLEASURE IN DOING THINGS: NOT AT ALL

## 2023-04-19 ASSESSMENT — FIBROSIS 4 INDEX
FIB4 SCORE: 0.63
FIB4 SCORE: 0.71
FIB4 SCORE: 0.71

## 2023-04-19 NOTE — ED TRIAGE NOTES
Ambulates to triage after an EKG  Chief Complaint   Patient presents with    Sore Throat     Started last night    Ear Pain     L ear pain since last night    Numbness     L sided face and L arm numbness since 8pm last night    Headache     L sided headache that started last night     Pt has no difficulty walking or holding things with her L hand, but feels like her L hand is weaker. All sx started last night on the L side. Took tylenol this morning for her headache, but didn't really help.

## 2023-04-20 ENCOUNTER — APPOINTMENT (OUTPATIENT)
Dept: RADIOLOGY | Facility: MEDICAL CENTER | Age: 33
End: 2023-04-20
Payer: COMMERCIAL

## 2023-04-20 VITALS
HEIGHT: 63 IN | RESPIRATION RATE: 16 BRPM | HEART RATE: 58 BPM | SYSTOLIC BLOOD PRESSURE: 120 MMHG | OXYGEN SATURATION: 96 % | WEIGHT: 227.07 LBS | BODY MASS INDEX: 40.23 KG/M2 | TEMPERATURE: 98.1 F | DIASTOLIC BLOOD PRESSURE: 65 MMHG

## 2023-04-20 PROBLEM — R11.2 NAUSEA & VOMITING: Status: RESOLVED | Noted: 2023-04-19 | Resolved: 2023-04-20

## 2023-04-20 PROBLEM — R29.90 STROKE-LIKE SYMPTOMS: Status: RESOLVED | Noted: 2023-04-19 | Resolved: 2023-04-20

## 2023-04-20 LAB
ALBUMIN SERPL BCP-MCNC: 3.6 G/DL (ref 3.2–4.9)
ALBUMIN/GLOB SERPL: 1.5 G/DL
ALP SERPL-CCNC: 126 U/L (ref 30–99)
ALT SERPL-CCNC: 21 U/L (ref 2–50)
ANION GAP SERPL CALC-SCNC: 11 MMOL/L (ref 7–16)
AST SERPL-CCNC: 25 U/L (ref 12–45)
BILIRUB SERPL-MCNC: 0.4 MG/DL (ref 0.1–1.5)
BUN SERPL-MCNC: 6 MG/DL (ref 8–22)
CALCIUM ALBUM COR SERPL-MCNC: 8.5 MG/DL (ref 8.5–10.5)
CALCIUM SERPL-MCNC: 8.2 MG/DL (ref 8.5–10.5)
CHLORIDE SERPL-SCNC: 109 MMOL/L (ref 96–112)
CO2 SERPL-SCNC: 20 MMOL/L (ref 20–33)
CREAT SERPL-MCNC: 0.49 MG/DL (ref 0.5–1.4)
ERYTHROCYTE [DISTWIDTH] IN BLOOD BY AUTOMATED COUNT: 49 FL (ref 35.9–50)
GFR SERPLBLD CREATININE-BSD FMLA CKD-EPI: 126 ML/MIN/1.73 M 2
GLOBULIN SER CALC-MCNC: 2.4 G/DL (ref 1.9–3.5)
GLUCOSE SERPL-MCNC: 91 MG/DL (ref 65–99)
HCT VFR BLD AUTO: 33.8 % (ref 37–47)
HGB BLD-MCNC: 11.3 G/DL (ref 12–16)
MCH RBC QN AUTO: 32.4 PG (ref 27–33)
MCHC RBC AUTO-ENTMCNC: 33.4 G/DL (ref 33.6–35)
MCV RBC AUTO: 96.8 FL (ref 81.4–97.8)
PLATELET # BLD AUTO: 245 K/UL (ref 164–446)
PMV BLD AUTO: 10.1 FL (ref 9–12.9)
POTASSIUM SERPL-SCNC: 3.9 MMOL/L (ref 3.6–5.5)
PROT SERPL-MCNC: 6 G/DL (ref 6–8.2)
RBC # BLD AUTO: 3.49 M/UL (ref 4.2–5.4)
SODIUM SERPL-SCNC: 140 MMOL/L (ref 135–145)
WBC # BLD AUTO: 4 K/UL (ref 4.8–10.8)

## 2023-04-20 PROCEDURE — G0378 HOSPITAL OBSERVATION PER HR: HCPCS

## 2023-04-20 PROCEDURE — 700105 HCHG RX REV CODE 258

## 2023-04-20 PROCEDURE — 70553 MRI BRAIN STEM W/O & W/DYE: CPT

## 2023-04-20 PROCEDURE — 85027 COMPLETE CBC AUTOMATED: CPT

## 2023-04-20 PROCEDURE — 80053 COMPREHEN METABOLIC PANEL: CPT

## 2023-04-20 PROCEDURE — 700117 HCHG RX CONTRAST REV CODE 255

## 2023-04-20 PROCEDURE — 700102 HCHG RX REV CODE 250 W/ 637 OVERRIDE(OP)

## 2023-04-20 PROCEDURE — 99239 HOSP IP/OBS DSCHRG MGMT >30: CPT | Performed by: HOSPITALIST

## 2023-04-20 PROCEDURE — A9270 NON-COVERED ITEM OR SERVICE: HCPCS

## 2023-04-20 PROCEDURE — A9579 GAD-BASE MR CONTRAST NOS,1ML: HCPCS

## 2023-04-20 RX ORDER — ONDANSETRON 4 MG/1
4 TABLET, ORALLY DISINTEGRATING ORAL EVERY 4 HOURS PRN
Qty: 10 TABLET | Refills: 0 | Status: SHIPPED | OUTPATIENT
Start: 2023-04-20 | End: 2024-02-06

## 2023-04-20 RX ORDER — ASPIRIN 81 MG
6 TABLET, DELAYED RELEASE (ENTERIC COATED) ORAL 2 TIMES DAILY
Qty: 3 ML | Refills: 0 | Status: SHIPPED | OUTPATIENT
Start: 2023-04-20 | End: 2023-04-23

## 2023-04-20 RX ORDER — TOPIRAMATE SPINKLE 25 MG/1
25 CAPSULE ORAL DAILY
Qty: 30 CAPSULE | Refills: 3 | Status: SHIPPED | OUTPATIENT
Start: 2023-04-20 | End: 2024-02-06

## 2023-04-20 RX ADMIN — OXYCODONE 5 MG: 5 TABLET ORAL at 06:17

## 2023-04-20 RX ADMIN — SODIUM CHLORIDE, POTASSIUM CHLORIDE, SODIUM LACTATE AND CALCIUM CHLORIDE: 600; 310; 30; 20 INJECTION, SOLUTION INTRAVENOUS at 06:19

## 2023-04-20 RX ADMIN — GADOTERIDOL 18 ML: 279.3 INJECTION, SOLUTION INTRAVENOUS at 08:39

## 2023-04-20 ASSESSMENT — PAIN DESCRIPTION - PAIN TYPE: TYPE: ACUTE PAIN

## 2023-04-20 NOTE — H&P
Hospital Medicine History & Physical Note    Date of Service  4/19/2023    Primary Care Physician  Marleny Faustin M.D.    Consultants  neurology    Specialist Names: Vivian    Code Status  Full Code    Chief Complaint  Chief Complaint   Patient presents with    Sore Throat     Started last night    Ear Pain     L ear pain since last night    Numbness     L sided face and L arm numbness since 8pm last night    Headache     L sided headache that started last night       History of Presenting Illness  Abril Vance is a 32 y.o. female who presented 4/19/2023 with stroke like symptoms, sore throat, left ear pain and paresthesias and pain from left-sided lip and mouth all the way down to left arm and leg, nausea and vomiting.  She has a past medical history of migraines, arthritis, hysterectomy, cholecystectomy, gastric sleeve surgery in 2016.  She reports that 2 days ago she developed a sore throat and left ear pain.  She has been having nausea and vomiting since then and has been unable to keep anything down.  The paresthesias on the left side began last night at 8:00 and have persisted.  There have been no alleviating factors.  She does have a history of migraines but states this does not feel like any migraine she has had.    In the ER, vital signs are stable.  Labs largely unremarkable except slightly elevated alkaline phosphate at 157.  EKG showing sinus rhythm.  CT and CTA of head and neck all negative.  Neurology Dr. Park, was consulted and recommended MRI brain, which has been ordered.  Viral panel ordered as well.    I discussed the plan of care with patient and bedside RN.    Review of Systems  Review of Systems   Constitutional:  Negative for chills, fever and malaise/fatigue.   HENT:  Positive for ear pain and sore throat.    Respiratory:  Negative for cough and shortness of breath.    Cardiovascular:  Negative for chest pain, palpitations and leg swelling.   Gastrointestinal:  Positive for  nausea and vomiting. Negative for abdominal pain, diarrhea and heartburn.   Genitourinary:  Negative for dysuria, frequency and urgency.   Neurological:  Positive for tingling, sensory change, focal weakness, weakness and headaches (Slight headache but not like a typical migraine). Negative for dizziness.   All other systems reviewed and are negative.    Past Medical History   has a past medical history of Arthritis, Gallstones, History of anemia, Migraines (4/5/2010), Morbid obesity (HCC) (4/5/2010), Sleep apnea, Snoring, and Vaginal trichomoniasis (4/5/2010).    Surgical History   has a past surgical history that includes lauren by laparoscopy (10/14/2010); other (2016); vaginal hysterectomy scope total (09/25/2018); salpingectomy (Bilateral, 09/25/2018); anterior and posterior repair (09/25/2018); enterocele repair (09/25/2018); bladder sling female (09/25/2018); vaginal suspension (09/25/2018); pr unlisted proc, arthroscopy (Left, 01/02/2023); pr repair collat ankle ligmnt,secondary (Left, 01/02/2023); pr release tib/fib/ankle flex tendon,ea (Left, 01/02/2023); and other abdominal surgery.     Family History  Family history is unknown by patient.   Family history reviewed with patient. There is no family history that is pertinent to the chief complaint.     Social History   reports that she has never smoked. She has never used smokeless tobacco. She reports current alcohol use. She reports that she does not use drugs.    Allergies  Allergies   Allergen Reactions    Nkda [No Known Drug Allergy]        Medications  Prior to Admission Medications   Prescriptions Last Dose Informant Patient Reported? Taking?   acetaminophen (TYLENOL) 500 MG Tab 4/19/2023 at 0900 Patient No No   Sig: Take 1-2 Tablets by mouth every 6 hours as needed for Moderate Pain.      Facility-Administered Medications: None       Physical Exam  Temp:  [36.3 °C (97.3 °F)] 36.3 °C (97.3 °F)  Pulse:  [63-74] 68  Resp:  [14-26] 21  BP:  ()/(55-76) 108/55  SpO2:  [88 %-100 %] 100 %  Blood Pressure: 99/57   Temperature: 36.3 °C (97.3 °F)   Pulse: 74   Respiration: (!) 21   Pulse Oximetry: 88 %       Physical Exam  Vitals and nursing note reviewed.   Constitutional:       General: She is awake.      Appearance: Normal appearance. She is not ill-appearing.   HENT:      Head: Normocephalic and atraumatic.      Jaw: There is normal jaw occlusion.      Right Ear: Hearing normal.      Left Ear: Hearing normal.      Nose: Nose normal.      Mouth/Throat:      Lips: Pink.      Mouth: Mucous membranes are moist.   Eyes:      Extraocular Movements: Extraocular movements intact.      Conjunctiva/sclera: Conjunctivae normal.      Pupils: Pupils are equal, round, and reactive to light.   Neck:      Vascular: No carotid bruit.   Cardiovascular:      Rate and Rhythm: Normal rate and regular rhythm.      Pulses: Normal pulses.      Heart sounds: Normal heart sounds, S1 normal and S2 normal.   Pulmonary:      Effort: Pulmonary effort is normal.      Breath sounds: Normal breath sounds and air entry. No stridor.   Abdominal:      General: Bowel sounds are normal.      Palpations: Abdomen is soft.      Tenderness: There is no abdominal tenderness.   Musculoskeletal:      Cervical back: Normal range of motion and neck supple.      Right lower leg: No edema.      Left lower leg: No edema.   Skin:     General: Skin is warm and dry.      Capillary Refill: Capillary refill takes less than 2 seconds.   Neurological:      General: No focal deficit present.      Mental Status: She is alert and oriented to person, place, and time. Mental status is at baseline.      Sensory: Sensory deficit present.      Motor: Weakness present.   Psychiatric:         Attention and Perception: Attention and perception normal.         Mood and Affect: Mood and affect normal.         Speech: Speech normal.         Behavior: Behavior normal. Behavior is cooperative.       Laboratory:       Recent Labs     04/19/23  1629   SODIUM 138   POTASSIUM 4.0   CHLORIDE 106   CO2 20   GLUCOSE 86   BUN 7*   CREATININE 0.53   CALCIUM 8.7     Recent Labs     04/19/23  1629   ALTSGPT 28   ASTSGOT 32   ALKPHOSPHAT 157*   TBILIRUBIN 0.4   GLUCOSE 86     Recent Labs     04/19/23  1629   INR 1.01     No results for input(s): NTPROBNP in the last 72 hours.      No results for input(s): TROPONINT in the last 72 hours.    Imaging:  CT-CTA NECK WITH & W/O-POST PROCESSING   Final Result      CT angiogram of the neck within normal limits.      CT-CTA HEAD WITH & W/O-POST PROCESS   Final Result      CT angiogram of the [place] within normal limits.      CT-HEAD W/O   Final Result      Head CT without contrast within normal limits. No evidence of acute cerebral infarction, hemorrhage or mass lesion.         MR-BRAIN-WITH & W/O    (Results Pending)       EKG:  I have personally reviewed the images and compared with prior images.    Assessment/Plan:  Justification for Admission Status  I anticipate this patient is appropriate for observation status at this time because monitor overnight, and MRI results.    Patient will need a Med/Surg bed on EMERGENCY service .  The need is secondary to continued left-sided paresthesias and weakness.    * Stroke-like symptoms- (present on admission)  Assessment & Plan  Patient having left sided paresthesias and pain from lips/tongue all the way down to left arm and leg.  She is weak on the left side 4 out of 5  History of migraines  CT/CTA head and neck negative  Dr. Park neurology consulted and recommended MRI of brain with and without  Pain medication      Nausea & vomiting  Assessment & Plan  - Patient has been having nausea and vomiting x2 days, has been unable to keep anything down  -IV fluids  -Antiemetics  -Clear liquid diet  -Viral panel ordered    Ear pain  Assessment & Plan  Started 2 days ago  Viral panel ordered    Sore throat  Assessment & Plan  Started 2 days  ago  Viral panel ordered    Hx of migraines  Assessment & Plan  - Patient reports history of migraines        VTE prophylaxis: enoxaparin ppx

## 2023-04-20 NOTE — ASSESSMENT & PLAN NOTE
- Patient has been having nausea and vomiting x2 days, has been unable to keep anything down  -IV fluids  -Antiemetics  -Clear liquid diet  -Viral panel ordered

## 2023-04-20 NOTE — DISCHARGE INSTRUCTIONS
Migraine Headache  A migraine headache is a very strong throbbing pain on one side or both sides of your head. This type of headache can also cause other symptoms. It can last from 4 hours to 3 days. Talk with your doctor about what things may bring on (trigger) this condition.  What are the causes?  The exact cause of this condition is not known. This condition may be triggered or caused by:  Drinking alcohol.  Smoking.  Taking medicines, such as:  Medicine used to treat chest pain (nitroglycerin).  Birth control pills.  Estrogen.  Some blood pressure medicines.  Eating or drinking certain products.  Doing physical activity.  Other things that may trigger a migraine headache include:  Having a menstrual period.  Pregnancy.  Hunger.  Stress.  Not getting enough sleep or getting too much sleep.  Weather changes.  Tiredness (fatigue).  What increases the risk?  Being 25-55 years old.  Being female.  Having a family history of migraine headaches.  Being .  Having depression or anxiety.  Being very overweight.  What are the signs or symptoms?  A throbbing pain. This pain may:  Happen in any area of the head, such as on one side or both sides.  Make it hard to do daily activities.  Get worse with physical activity.  Get worse around bright lights or loud noises.  Other symptoms may include:  Feeling sick to your stomach (nauseous).  Vomiting.  Dizziness.  Being sensitive to bright lights, loud noises, or smells.  Before you get a migraine headache, you may get warning signs (an aura). An aura may include:  Seeing flashing lights or having blind spots.  Seeing bright spots, halos, or zigzag lines.  Having tunnel vision or blurred vision.  Having numbness or a tingling feeling.  Having trouble talking.  Having weak muscles.  Some people have symptoms after a migraine headache (postdromal phase), such as:  Tiredness.  Trouble thinking (concentrating).  How is this treated?  Taking medicines that:  Relieve  pain.  Relieve the feeling of being sick to your stomach.  Prevent migraine headaches.  Treatment may also include:  Having acupuncture.  Avoiding foods that bring on migraine headaches.  Learning ways to control your body functions (biofeedback).  Therapy to help you know and deal with negative thoughts (cognitive behavioral therapy).  Follow these instructions at home:  Medicines  Take over-the-counter and prescription medicines only as told by your doctor.  Ask your doctor if the medicine prescribed to you:  Requires you to avoid driving or using heavy machinery.  Can cause trouble pooping (constipation). You may need to take these steps to prevent or treat trouble pooping:  Drink enough fluid to keep your pee (urine) pale yellow.  Take over-the-counter or prescription medicines.  Eat foods that are high in fiber. These include beans, whole grains, and fresh fruits and vegetables.  Limit foods that are high in fat and sugar. These include fried or sweet foods.  Lifestyle  Do not drink alcohol.  Do not use any products that contain nicotine or tobacco, such as cigarettes, e-cigarettes, and chewing tobacco. If you need help quitting, ask your doctor.  Get at least 8 hours of sleep every night.  Limit and deal with stress.  General instructions         Keep a journal to find out what may bring on your migraine headaches. For example, write down:  What you eat and drink.  How much sleep you get.  Any change in what you eat or drink.  Any change in your medicines.  If you have a migraine headache:  Avoid things that make your symptoms worse, such as bright lights.  It may help to lie down in a dark, quiet room.  Do not drive or use heavy machinery.  Ask your doctor what activities are safe for you.  Keep all follow-up visits as told by your doctor. This is important.  Contact a doctor if:  You get a migraine headache that is different or worse than others you have had.  You have more than 15 headache days in one  month.  Get help right away if:  Your migraine headache gets very bad.  Your migraine headache lasts longer than 72 hours.  You have a fever.  You have a stiff neck.  You have trouble seeing.  Your muscles feel weak or like you cannot control them.  You start to lose your balance a lot.  You start to have trouble walking.  You pass out (faint).  You have a seizure.  Summary  A migraine headache is a very strong throbbing pain on one side or both sides of your head. These headaches can also cause other symptoms.  This condition may be treated with medicines and changes to your lifestyle.  Keep a journal to find out what may bring on your migraine headaches.  Contact a doctor if you get a migraine headache that is different or worse than others you have had.  Contact your doctor if you have more than 15 headache days in a month.  This information is not intended to replace advice given to you by your health care provider. Make sure you discuss any questions you have with your health care provider.  Document Released: 09/26/2009 Document Revised: 04/10/2020 Document Reviewed: 01/30/2020  Elsevier Patient Education © 2020 [employer]

## 2023-04-20 NOTE — ED NOTES
Report received from Stephanie FELICIANO. Assumed care of patient. Pt assessed, AAO x 4 . Patient's concerns addressed. Patient aware of POC. Fall precautions in place.  Pt repositioned and comfortable.  Call light within reach. This RN masked and in appropriate PPE during encounter.  Awaiting for MRI and bed assignment.

## 2023-04-20 NOTE — ED PROVIDER NOTES
ED Provider Note    CHIEF COMPLAINT  Chief Complaint   Patient presents with    Sore Throat     Started last night    Ear Pain     L ear pain since last night    Numbness     L sided face and L arm numbness since 8pm last night    Headache     L sided headache that started last night       EXTERNAL RECORDS REVIEWED  None    HPI/ROS  LIMITATION TO HISTORY   None  OUTSIDE HISTORIAN(S):  None    Abril Vance is a 32 y.o. female who presents here for evaluation of sore throat, left sided paresthesias to the arm and leg, weakness to the arm and leg, and paresthesias to the left side of the face.  Patient states her symptoms started around 8 PM last night, and have been persistent till today.  She has no fever chills or vomiting, and she has no chest pain or shortness of breath.  Patient denies any fall or trauma.    PAST MEDICAL HISTORY   has a past medical history of Arthritis, Gallstones, History of anemia, Migraines (4/5/2010), Morbid obesity (HCC) (4/5/2010), Sleep apnea, Snoring, and Vaginal trichomoniasis (4/5/2010).    SURGICAL HISTORY   has a past surgical history that includes lauren by laparoscopy (10/14/2010); other (2016); vaginal hysterectomy scope total (9/25/2018); salpingectomy (Bilateral, 9/25/2018); anterior and posterior repair (9/25/2018); enterocele repair (9/25/2018); bladder sling female (9/25/2018); vaginal suspension (9/25/2018); unlisted proc, arthroscopy (Left, 1/2/2023); repair collat ankle ligmnt,secondary (Left, 1/2/2023); and release tib/fib/ankle flex tendon,ea (Left, 1/2/2023).    FAMILY HISTORY  Family History   Family history unknown: Yes       SOCIAL HISTORY  Social History     Tobacco Use    Smoking status: Never    Smokeless tobacco: Never   Vaping Use    Vaping Use: Never used   Substance and Sexual Activity    Alcohol use: Yes     Comment: occasionally    Drug use: No    Sexual activity: Not on file       CURRENT MEDICATIONS  Home Medications       Reviewed by Kelly DEL ANGEL  OCTAVIO Means (Registered Nurse) on 04/19/23 at 1519  Med List Status: Complete     Medication Last Dose Status   acetaminophen (TYLENOL) 500 MG Tab 4/19/2023 Active                    ALLERGIES  Allergies   Allergen Reactions    Nkda [No Known Drug Allergy]        PHYSICAL EXAM  VITAL SIGNS: /66   Pulse 70   Temp 36.3 °C (97.3 °F) (Temporal)   Resp (!) 26   Wt 104 kg (230 lb 2.6 oz)   LMP 09/20/2018 (Approximate)   SpO2 99%   BMI 40.77 kg/m²    Constitutional: Well developed, well nourished. No acute distress.  HEENT: Normocephalic, atraumatic. Posterior pharynx clear and moist.  Bilateral TMs clear  Eyes:  EOMI. Normal sclera.  Neck: Supple, Full range of motion, nontender.  Chest/Pulmonary: clear to ausculation. Symmetrical expansion.   Cardio: Regular rate and rhythm with no murmur.   Abdomen: Soft, nontender. No peritoneal signs. No guarding. No palpable masses.  Back: No CVA tenderness, nontender midline, no step offs.  Musculoskeletal: No deformity, no edema, neurovascular intact.   Neuro: Clear speech, appropriate, cooperative, cranial nerves II-XII grossly intact.  Tactile paresthesias noted to the left face, left upper extremity, and left thigh.  Right  5 out of 5, left  4 out of 5, right dorsi plantarflexion extension 5 out of 5, left is 4 out of 5.  Psych: Normal mood and affect      DIAGNOSTIC STUDIES / PROCEDURES  Results for orders placed or performed during the hospital encounter of 04/19/23   COMP METABOLIC PANEL   Result Value Ref Range    Sodium 138 135 - 145 mmol/L    Potassium 4.0 3.6 - 5.5 mmol/L    Chloride 106 96 - 112 mmol/L    Co2 20 20 - 33 mmol/L    Anion Gap 12.0 7.0 - 16.0    Glucose 86 65 - 99 mg/dL    Bun 7 (L) 8 - 22 mg/dL    Creatinine 0.53 0.50 - 1.40 mg/dL    Calcium 8.7 8.5 - 10.5 mg/dL    AST(SGOT) 32 12 - 45 U/L    ALT(SGPT) 28 2 - 50 U/L    Alkaline Phosphatase 157 (H) 30 - 99 U/L    Total Bilirubin 0.4 0.1 - 1.5 mg/dL    Albumin 4.2 3.2 - 4.9 g/dL     Total Protein 7.5 6.0 - 8.2 g/dL    Globulin 3.3 1.9 - 3.5 g/dL    A-G Ratio 1.3 g/dL   PT/INR   Result Value Ref Range    PT 13.2 12.0 - 14.6 sec    INR 1.01 0.87 - 1.13   BETA-HCG QUALITATIVE SERUM   Result Value Ref Range    Beta-Hcg Qualitative Serum Negative Negative   CORRECTED CALCIUM   Result Value Ref Range    Correct Calcium 8.5 8.5 - 10.5 mg/dL   ESTIMATED GFR   Result Value Ref Range    GFR (CKD-EPI) 124 >60 mL/min/1.73 m 2   EKG   Result Value Ref Range    Report       [facility] Emergency Dept[facility]    Test Date:  2023  Pt Name:    CHANTE MENARD                Department: ER  MRN:        [id number]                      Room:  Gender:     Female                       Technician: [id number]  :        1990                   Requested By:ER TRIAGE PROTOCOL  Order #:    [id number]                    Reading MD:    Measurements  Intervals                                Axis  Rate:       65                           P:          50  NM:         121                          QRS:        40  QRSD:       113                          T:          12  QT:         453  QTc:        472    Interpretive Statements  Sinus rhythm  Borderline intraventricular conduction delay  Compared to ECG 2022 09:57:21  No significant changes       EKG; normal sinus rhythm at a rate of 65.  No ST elevation, no ST depression.  QTc is 472.  Compared to EKG from 2022.    RADIOLOGY  I have independently interpreted the diagnostic imaging associated with this visit and am waiting the final reading from the radiologist.   My preliminary interpretation is as follows: see below  Radiologist interpretation:   CT-CTA NECK WITH & W/O-POST PROCESSING   Final Result      CT angiogram of the neck within normal limits.      CT-CTA HEAD WITH & W/O-POST PROCESS   Final Result      CT angiogram of the [place] within normal limits.      CT-HEAD W/O   Final Result      Head CT without contrast within normal limits.  No evidence of acute cerebral infarction, hemorrhage or mass lesion.               COURSE & MEDICAL DECISION MAKING    4:45 PM  Patient was made a stroke IR based on her left-sided paresthesias and timing of 8 PM last night.  She has gone over the CT scan.    4:50 PM  Patient seen by neurology PA.  She will be going to CT scan.    5:05 PM  Patient has been seen by neurology, , who states that the patient likely has a migraine, however if she is still symptomatic we will do an MRI and admit her.      INITIAL ASSESSMENT, COURSE AND PLAN  Care Narrative: The patient has been seen evaluate by neurology, and was a stroke IR based on her left-sided weakness and presentation.  Patient is not a candidate for alteplase, and has an NIH of 1.  She has been seen by neurology, and they recommend admit with MRI if patient remains symptomatic.    DISPOSITION AND DISCUSSIONS  Admit to hospital service, neurology to follow as needed.    CRITICAL CARE  The very real possibility of a deterioration of this patient's condition required the highest level of my preparedness for sudden, emergent intervention.  I provided critical care services, which included medication orders, frequent reevaluations of the patient's condition and response to treatment, ordering and reviewing test results, and discussing the case with various consultants.  The critical care time associated with the care of the patient was 45 minutes. Review chart for interventions. This time is exclusive of any other billable procedures.     FINAL DIAGNOSIS  Stroke like symptoms   Critical care time 45 minutes        Electronically signed by: Preet Barrett D.O., 4/19/2023 5:02 PM

## 2023-04-20 NOTE — PROGRESS NOTES
Assumed care of patient, heard report from Padmini FELICIANO, transferred patient to T202 via wheelchair, completed admission profile and assessment.  Patient A&Ox4, continuous telemetry monitoring in place, complains of 6/10 pain in left ear and no nausea.  Patient comfortable in bed, upper bed rails in place, bed in lowest locked setting, non-slip socks on, educated on fall prevention and safety precautions.  Educated on plan of care and all needs addressed at this time.

## 2023-04-20 NOTE — PROGRESS NOTES
Patients IV removed. Patient educated and repeat back understanding. Patient signed all documents with complete understanding.

## 2023-04-20 NOTE — ASSESSMENT & PLAN NOTE
Patient having left sided paresthesias and pain from lips/tongue all the way down to left arm and leg.  She is weak on the left side 4 out of 5  History of migraines  CT/CTA head and neck negative  Dr. Park neurology consulted and recommended MRI of brain with and without  Pain medication

## 2023-04-20 NOTE — CARE PLAN
Problem: Pain - Standard  Goal: Alleviation of pain or a reduction in pain to the patient’s comfort goal  Outcome: Progressing   The patient is Stable - Low risk of patient condition declining or worsening    Shift Goals  Clinical Goals: MRI by 1200  Patient Goals: rest  Family Goals: n/a    Progress made toward(s) clinical / shift goals:  Yes Pt got  her MRI done prior 1200 awaiting for results at this time. No s/sx of stroke noted or reported    Patient is not progressing towards the following goals:

## 2023-04-20 NOTE — PROGRESS NOTES
4 Eyes Skin Assessment Completed by JODIE Vela and JODIE Mrecado.    Head WDL  Ears WDL  Nose WDL  Mouth WDL  Neck WDL  Breast/Chest WDL  Shoulder Blades WDL  Spine WDL  (R) Arm/Elbow/Hand WDL  (L) Arm/Elbow/Hand WDL  Abdomen WDL  Groin WDL  Scrotum/Coccyx/Buttocks WDL  (R) Leg WDL  (L) Leg WDL  (R) Heel/Foot/Toe WDL  (L) Heel/Foot/Toe WDL          Devices In Places Tele Box, Blood Pressure Cuff, and Pulse Ox      Interventions In Place N/A    Possible Skin Injury No    Pictures Uploaded Into Epic N/A  Wound Consult Placed N/A  RN Wound Prevention Protocol Ordered No

## 2023-04-21 NOTE — DISCHARGE SUMMARY
Discharge Summary    CHIEF COMPLAINT ON ADMISSION  Chief Complaint   Patient presents with    Sore Throat     Started last night    Ear Pain     L ear pain since last night    Numbness     L sided face and L arm numbness since 8pm last night    Headache     L sided headache that started last night       Reason for Admission  Shortness of Breath, Headache, Lef*     Admission Date  4/19/2023    CODE STATUS  Prior    HPI & HOSPITAL COURSE  AS PER JORGITO CHEN H+P    Abril Vance is a 32 y.o. female who presented 4/19/2023 with stroke like symptoms, sore throat, left ear pain and paresthesias and pain from left-sided lip and mouth all the way down to left arm and leg, nausea and vomiting.  She has a past medical history of migraines, arthritis, hysterectomy, cholecystectomy, gastric sleeve surgery in 2016.  She reports that 2 days ago she developed a sore throat and left ear pain.  She has been having nausea and vomiting since then and has been unable to keep anything down.  The paresthesias on the left side began last night at 8:00 and have persisted.  There have been no alleviating factors.  She does have a history of migraines but states this does not feel like any migraine she has had.     In the ER, vital signs are stable.  Labs largely unremarkable except slightly elevated alkaline phosphate at 157.  EKG showing sinus rhythm.  CT and CTA of head and neck all negative.  Neurology Dr. Park, was consulted and recommended MRI brain, which has been ordered.  Viral panel ordered as well.    =====================================    Patient was complaining of ear pain and I did examine her ears there is evidence of abundance of cerumen patient was sent home with Debrox for both ears    MRI was unremarkable.  Your attributing patient's symptoms to a complex migraine.  Started on low patrols Topamax and referred to neurology in the outpatient setting          Therefore, she is discharged in good and stable  condition to home with close outpatient follow-up.    The patient recovered much more quickly than anticipated on admission.    Discharge Date  4/20/2023    FOLLOW UP ITEMS POST DISCHARGE      DISCHARGE DIAGNOSES  Principal Problem (Resolved):    Stroke-like symptoms POA: Yes  Active Problems:    Sore throat POA: Yes    Ear pain POA: Yes    Hx of migraines POA: Yes  Resolved Problems:    Nausea & vomiting POA: Yes      FOLLOW UP  No future appointments.  Marleny Faustin M.D.  [address] [facility] [address]  [phone]    Call  Follow- Up      MEDICATIONS ON DISCHARGE     Medication List        START taking these medications        Instructions   Debrox 6.5 % Soln  Generic drug: carbamide peroxide   Administer 6 Drops into affected ear(s) 2 times a day for 3 days. Both ears bid x 3 days  Dose: 6 Drop     ondansetron 4 MG Tbdp  Commonly known as: ZOFRAN ODT   Take 1 Tablet by mouth every four hours as needed for Nausea/Vomiting (give PO if no IV route available).  Dose: 4 mg     topiramate 25 MG capsule  Commonly known as: TOPAMAX   Take 1 Capsule by mouth every day.  Dose: 25 mg            CONTINUE taking these medications        Instructions   acetaminophen 500 MG Tabs  Commonly known as: TYLENOL   Take 1-2 Tablets by mouth every 6 hours as needed for Moderate Pain.  Dose: 500-1,000 mg              Allergies  Allergies   Allergen Reactions    Nkda [No Known Drug Allergy]        DIET  No orders of the defined types were placed in this encounter.      ACTIVITY  As tolerated.  Weight bearing as tolerated    CONSULTATIONS      PROCEDURES  MR-BRAIN-WITH & W/O  Order: [id number]  Status: Final result     Visible to patient: Yes (seen)     Next appt: None     0 Result Notes  Details    Reading Physician Reading Date Result Priority   Nirali Louise M.D.  [phone] 4/20/2023 Routine     Narrative & Impression     4/20/2023 8:37 AM     HISTORY/REASON FOR EXAM:  Left-sided facial numbness and blurred  vision for 2 days     TECHNIQUE/EXAM DESCRIPTION:     T1 sagittal, T2 axial, flair coronal, T1 coronal, and diffusion-weighted axial images were obtained of the brain pre-contrast followed by T1 coronal and axial images post intravenous administration of 18 mL ProHance.     COMPARISON: CT brain, CTA 4/19/2023     FINDINGS:     Diffusion-weighted images are normal. Gradient-echo images are normal.  There is no abnormal intracranial enhancement.  The Sella is within normal limits.  The craniocervical junction is within normal limits.  No focal brain lesions are identified.  There is no evidence of intracranial mass or mass effect. No evidence of midline shift.  There is no evidence of focal cerebral edema.  There are no extra axial collections.  Visualized intracranial arterial flow voids are within normal limits.  Bone marrow signal in the calvarium is within normal limits.  Included portions of the paranasal sinuses are within normal limits.  Included portions of the mastoid air cells are within normal limits.  Included portions of the orbits are within normal limits     IMPRESSION:        Contrast enhanced brain  MRI within normal limits.           Exam Ended: 04/20/23  9:20 AM             LABORATORY  Lab Results   Component Value Date    SODIUM 140 04/20/2023    POTASSIUM 3.9 04/20/2023    CHLORIDE 109 04/20/2023    CO2 20 04/20/2023    GLUCOSE 91 04/20/2023    BUN 6 (L) 04/20/2023    CREATININE 0.49 (L) 04/20/2023    CREATININE 0.7 04/28/2009        Lab Results   Component Value Date    WBC 4.0 (L) 04/20/2023    HEMOGLOBIN 11.3 (L) 04/20/2023    HEMATOCRIT 33.8 (L) 04/20/2023    PLATELETCT 245 04/20/2023        Total time of the discharge process exceeds 38 minutes.

## 2023-05-08 NOTE — CARE PLAN
Patients GI provider:  Dr Melissa Taylor     Number to return call: [phone]     Reason for call: Pt called and requested a call back she is schedule today for iron infusion and wants to talk to someone regarding appointment please reach out thank you     Scheduled procedure/appointment date if applicable: n/a The patient is Watcher - Medium risk of patient condition declining or worsening    Shift Goals  Clinical Goals: pain management, rest, MRI  Patient Goals: Pain control, rest  Family Goals: n/a    Progress made toward(s) clinical / shift goals:  Patient educated on plan of care and diagnostic tests.  Pain management progressing through medication (see MAR), rest, and positioning.      Problem: Pain - Standard  Goal: Alleviation of pain or a reduction in pain to the patient’s comfort goal  Outcome: Progressing     Problem: Knowledge Deficit - Standard  Goal: Patient and family/care givers will demonstrate understanding of plan of care, disease process/condition, diagnostic tests and medications  Outcome: Progressing

## 2023-11-30 ENCOUNTER — HOSPITAL ENCOUNTER (OUTPATIENT)
Dept: RADIOLOGY | Facility: MEDICAL CENTER | Age: 33
End: 2023-11-30
Attending: NURSE PRACTITIONER
Payer: COMMERCIAL

## 2023-12-05 ENCOUNTER — OFFICE VISIT (OUTPATIENT)
Dept: SURGERY | Facility: MEDICAL CENTER | Age: 33
End: 2023-12-05
Payer: COMMERCIAL

## 2023-12-05 VITALS
BODY MASS INDEX: 43.59 KG/M2 | OXYGEN SATURATION: 87 % | HEART RATE: 43 BPM | HEIGHT: 63 IN | DIASTOLIC BLOOD PRESSURE: 82 MMHG | SYSTOLIC BLOOD PRESSURE: 116 MMHG | TEMPERATURE: 98.3 F | WEIGHT: 246 LBS

## 2023-12-05 DIAGNOSIS — N64.52 NIPPLE DISCHARGE, BLOODY: ICD-10-CM

## 2023-12-05 PROCEDURE — 99204 OFFICE O/P NEW MOD 45 MIN: CPT | Performed by: SURGERY

## 2023-12-05 PROCEDURE — 3079F DIAST BP 80-89 MM HG: CPT | Performed by: SURGERY

## 2023-12-05 PROCEDURE — 3074F SYST BP LT 130 MM HG: CPT | Performed by: SURGERY

## 2023-12-05 RX ORDER — MELOXICAM 7.5 MG/1
TABLET ORAL
COMMUNITY
Start: 2023-11-27

## 2023-12-05 ASSESSMENT — FIBROSIS 4 INDEX: FIB4 SCORE: 0.78

## 2023-12-05 ASSESSMENT — ENCOUNTER SYMPTOMS
BACK PAIN: 1
NAUSEA: 1

## 2023-12-05 NOTE — PROGRESS NOTES
"Subjective     Abril Vance is a 33 y.o. female who presents for evaluation of right breast pain, nipple discharge, and imaging abnormality.  She reports that this started in mid-2023.  She has pain in the nipple/areola that is present with any touch or squeezing.  She has had discharge coming from the nipple, usually with stimulation, that \"looks like a pimple,\" white/pus-like/yellow-green/sometimes bloody.  The skin around the nipple is occasionally red, not currently.  She had an apparent abscess identified on ultrasound in the ED in 2023 that was treated with Augmentin; the sonographic abnormalities had improved on repeat ultrasound 2023, but her symptoms never truly went away.  She has tried meloxicam and naproxen for antiinflammatory effects for over 3 months with no benefit.    Routine self breast exams: Yes  Breast pain: Yes  Nipple discharge: Yes  Skin changes: Yes  Masses: No   Contour/nipple changes: No   Previous breast biopsy or surgery: No     Age at menarche: 9  Age at menopause: premenopausal  Age at first birth: 16  .  She did not breastfeed.  Hormone replacement therapy: No     Family history of cancer: None.  No Ashkenazi Buddhism heritage.    Lifetime (5-yr) breast cancer risk assessment calculation  Tyrer-Cuzick v7: 9.8 % (0.2 %)  Tyrer-Cuzick v8: 9.8 % (0.2 %)  Nora model: 8.1 % (0.2 %)    Imaging  Bilateral diagnostic mammogram (RDC) 2023:  No suspicious findings, BIRADS 2, density B.  Right diagnostic ultrasound (RD) 2023:  Right retroareolar focal soft tissue thickening without abscess, BIRADS 2.   Prior Imaging  Right diagnostic ultrasound ([facility] ED) 2023: Right retroareolar 1.3cm likely abscess.  Right diagnostic ultrasound ([facility] ED) 2023: Abscess resolved.    Past Medical History   Past Medical History:   Diagnosis Date    Arthritis     RA- hands, knees    Gallstones         History of anemia     Migraines 2010    " Morbid obesity (HCC) 4/5/2010    Sleep apnea     does not use cpap, pt reports that she was told it is a mild case    Snoring     sleep study done    Vaginal trichomoniasis 4/5/2010       Surgical History  Past Surgical History:   Procedure Laterality Date    MI UNLISTED PROC, ARTHROSCOPY Left 01/02/2023    Procedure: LEFT ANTERIOR/POSTERIOR ANKLE ARTHROSCOPY, EVALUATE PERONEAL TENDONS, LATERAL LIGAMENT RECONSTRUCTION, EVALUATE BIFURCATE LIGAMENT, REPAIRS AS INDICATED.;  Surgeon: Matti Carlos M.D.;  Location: [facility];  Service: Orthopedics    PB REPAIR COLLAT ANKLE LIGMNT,SECONDARY Left 01/02/2023    Procedure: LEFT ANTERIOR/POSTERIOR ANKLE ARTHROSCOPY, EVALUATE PERONEAL TENDONS, LATERAL LIGAMENT RECONSTRUCTION, EVALUATE BIFURCATE LIGAMENT, REPAIRS AS INDICATED;  Surgeon: Matti Carlos M.D.;  Location: [facility];  Service: Orthopedics    PB RELEASE TIB/FIB/ANKLE FLEX TENDON,EA Left 01/02/2023    Procedure: REPAIR, TENDON, LOWER EXTREMITY, USING TENDON GRAFT.;  Surgeon: Matti Carlos M.D.;  Location: [facility];  Service: Orthopedics    VAGINAL HYSTERECTOMY SCOPE TOTAL  09/25/2018    Procedure: VAGINAL HYSTERECTOMY SCOPE TOTAL;  Surgeon: Abram Dupree M.D.;  Location: SURGERY SAME DAY [facility];  Service: Gynecology    SALPINGECTOMY Bilateral 09/25/2018    Procedure: SALPINGECTOMY;  Surgeon: Abram Dupree M.D.;  Location: SURGERY SAME DAY [facility];  Service: Gynecology    ANTERIOR AND POSTERIOR REPAIR  09/25/2018    Procedure: ANTERIOR AND POSTERIOR REPAIR;  Surgeon: Abram Dupree M.D.;  Location: SURGERY SAME DAY [facility];  Service: Gynecology    ENTEROCELE REPAIR  09/25/2018    Procedure: ENTEROCELE REPAIR- PERINEOPLASTY;  Surgeon: Abram Dupree M.D.;  Location: SURGERY SAME DAY [facility];  Service: Gynecology    BLADDER SLING FEMALE  09/25/2018    Procedure: BLADDER SLING FEMALE- TOT;  Surgeon: Abram Dupree M.D.;  Location: SURGERY SAME DAY  [facility];  Service: Gynecology    VAGINAL SUSPENSION  09/25/2018    Procedure: VAGINAL SUSPENSION- POSS SACROSPINOUS VAULT;  Surgeon: Abram Dupree M.D.;  Location: SURGERY SAME DAY [facility];  Service: Gynecology    OTHER  2016    gastric sleeve    PATEL BY LAPAROSCOPY  10/14/2010    Performed by YAIR ENNIS at SURGERY [facility]    OTHER ABDOMINAL SURGERY      Cholecystectomy       Family History  Family History   Family history unknown: Yes       Social History  Social History     Socioeconomic History    Marital status: Single     Spouse name: Not on file    Number of children: Not on file    Years of education: Not on file    Highest education level: Not on file   Occupational History    Not on file   Tobacco Use    Smoking status: Never    Smokeless tobacco: Never   Vaping Use    Vaping Use: Never used   Substance and Sexual Activity    Alcohol use: Yes     Comment: occasionally    Drug use: No    Sexual activity: Not on file   Other Topics Concern    Not on file   Social History Narrative    ** Merged History Encounter **          Social Determinants of Health     Financial Resource Strain: Not on file   Food Insecurity: Not on file   Transportation Needs: Not on file   Physical Activity: Not on file   Stress: Not on file   Social Connections: Not on file   Intimate Partner Violence: Not on file   Housing Stability: Not on file        Review of Systems  Review of Systems   Gastrointestinal:  Positive for nausea.   Musculoskeletal:  Positive for back pain.   All other systems reviewed and are negative.       Objective   LMP 09/20/2018 (Approximate)    Physical Exam  Vitals and nursing note reviewed.   Constitutional:       General: She is not in acute distress.     Appearance: Normal appearance. She is obese.   HENT:      Head: Normocephalic and atraumatic.      Right Ear: External ear normal.      Left Ear: External ear normal.      Nose: Nose normal.      Mouth/Throat:      Pharynx:  Oropharynx is clear.   Eyes:      General: No scleral icterus.     Conjunctiva/sclera: Conjunctivae normal.   Cardiovascular:      Rate and Rhythm: Normal rate and regular rhythm.      Heart sounds: Normal heart sounds. No murmur heard.     No friction rub. No gallop.   Pulmonary:      Effort: Pulmonary effort is normal. No respiratory distress.      Breath sounds: Normal breath sounds. No wheezing, rhonchi or rales.   Chest:   Breasts:     Stefan Score is 5.      Breasts are symmetrical.      Right: Normal. No swelling, bleeding, inverted nipple, mass, nipple discharge or skin change.      Left: Normal. No swelling, bleeding, inverted nipple, mass, nipple discharge or skin change.      Comments: Bilateral breasts examined in the upright and supine positions.  No suspicious skin changes (erythema, peau d'orange).  No unexpected contour abnormalities.  Bilateral breast tissue heterogeneously dense with no dominant masses or nodules; the retroareolar tissue is slightly denser on the right than on the left.  Bilateral nipples everted without expressible discharge.  No palpable cervical, supraclavicular, or axillary adenopathy bilaterally.    Bedside ultrasound performed with the GE 12mHz linear ultrasound probe; this shows normal appearing retroareolar tissues in the right breast with resolution of the previously seen inflammatory changes/possible phlegmon.  Abdominal:      General: Abdomen is flat. There is no distension.      Palpations: Abdomen is soft. There is no mass.   Musculoskeletal:         General: No swelling or deformity. Normal range of motion.      Cervical back: Neck supple.      Comments: Brace on left ankle   Lymphadenopathy:      Cervical: No cervical adenopathy.      Upper Body:      Right upper body: No supraclavicular or axillary adenopathy.      Left upper body: No supraclavicular or axillary adenopathy.   Skin:     General: Skin is warm and dry.      Capillary Refill: Capillary refill takes less  than 2 seconds.   Neurological:      General: No focal deficit present.      Mental Status: She is alert and oriented to person, place, and time.   Psychiatric:         Mood and Affect: Mood normal.         Behavior: Behavior normal.         Thought Content: Thought content normal.         Judgment: Judgment normal.         Assessment & Plan   The patient is a delightful 33 y.o. female with what appears to be a chronic inflammatory process in the right breast.  We discussed the differential diagnosis (periductal mastitis, but she is not a smoker; infectious mastitis, but she currently has no objective signs of infection; granulomatous mastitis, but she currently has no targetable imaging abnormalities; diabetic mastopathy, but she has no evidence of diabetes).  My initial inclination was to try a scheduled regimen of systemic antiinflammatories, but she has been on scheduled naproxen without any improvement.  Without objective signs of infection I am hesitant to try empiric antibiotics due to concern for resistance and side effects (she reports a terrible yeast infection after 7 days of Augmentin in February).  We discussed the option of intralesional steroid injection, but this is usually reserved for biopsy-proven granulomatous mastitis with a targetable lesion.  Therefore, for now I recommend watchful waiting.  She will call at the first signs of worsening/flareup and we will reassess at that time.  Otherwise, I will see her back in February 2024 for repeat clinical breast exam.  We did discuss additional options for breast pain and she was given a handout discussing over-the-counter supplements with good evidence for treatment of breast pain.  All questions answered in detail.  I will see her back in 2 months or sooner if needed.     A total of 45 minutes were spent on and with this patient today, including review of records, independent review of imaging, history and physical exam, counseling, documentation of  exam, and coordination of care.

## 2024-02-09 ENCOUNTER — TELEPHONE (OUTPATIENT)
Dept: SURGERY | Facility: MEDICAL CENTER | Age: 34
End: 2024-02-09
Payer: COMMERCIAL

## 2024-02-09 NOTE — TELEPHONE ENCOUNTER
I left a voicemail for patient to call back to rescheduling the 2/6/24 no show appointment with Dr. Sparrow or to give us an update.

## 2024-03-04 NOTE — ED NOTES
"  Patient ID: Roshni Loomis is a 54 y.o. female who presents for No chief complaint on file.. (Menopause)    Referring Provider: Yvette Booth    Pt was referred for Imvexxy   Last visit with Women's Health: 2/29/24    Preferred Pharmacy:    [id number] - [place], [place] - [address] AT [address]  [address] [address]  Phone: [phone] Fax: [phone]     [facility]  [address]  [facility] [address]  Phone: [phone] Fax: [phone]      Adherence/Organization:  Do you have copays on your medications? Yes  Do you have trouble affording your current medications? Yes    Subjective    Menstrual History:   2019 No LMP recorded. Patient is postmenopausal.     Uterus: Yes  Cancer History: No  Tobacco: No  Alcohol:  No    Vaginal Symptoms  Dyspareunia (pain before/during/after intercourse): No  Vaginal Bleeding with Sexual Activity: No  Vaginal Dryness: Yes  Dysuria (pain, burning, stinging, or itching with urination): No  Vaginal and/or vulvar irritation/itching: No  Recurrent urinary tract infections: No  Recurrent yeast infections: No    Past medications:   Vagifem - irritated partner    Vasomotor symptoms   Hot flashes started back up because \"eating sweets.\" Patient has started trying to refrain from sugar again to control hot flashes.   Current Medications:   N/a  Lab Results   Component Value Date    AST 17 03/30/2023    AST 16 03/29/2022    AST 19 04/19/2021    ALT 13 03/30/2023    ALT 16 03/29/2022    ALT 14 04/19/2021     Lab Results   Component Value Date    CREATININE 0.80 03/30/2023    GFRF 87 03/30/2023       Bone Health  Osteopenia or osteoporosis: has a DEXA scan \"a few years ago\"- normal  Current Medications:   none  Lab Results   Component Value Date    CALCIUM 8.9 03/30/2023       Blood Sugar Balance  Diabetes Dx: none, discussed elevated A1C recommended protein and fiber with meals, avoiding " Pt resting with even chest rise and fall, reports no needs at this time, call light available and in reach.     "processed foods.   Current Medications:   None  Lab Results   Component Value Date    GLUCOSE 80 03/30/2023    HGBA1C 5.7 (A) 03/30/2023       Thyroid  Current Medication:     Lab Results   Component Value Date    TSH 1.73 03/30/2023        Cardiovascular Health  Prior CV event? None  Current Medications:   None  The ASCVD Risk score (Lina DOLAN, et al., 2019) failed to calculate for the following reasons:    The valid total cholesterol range is 130 to 320 mg/dL    Lab Results   Component Value Date    CHOL 127 03/30/2023     Lab Results   Component Value Date    HDL 48.5 03/30/2023     No results found for: \"LDLCALC\"  Lab Results   Component Value Date    TRIG 48 03/30/2023     No components found for: \"CHOLHDL\"   BP Readings from Last 3 Encounters:   02/29/24 120/78   11/20/23 167/80   11/06/23 170/87      At home readings: 127/70's 2 weeks ago. Patient states normal is 140's/70-90's.   May consider adding an ARB discussed with patient, recommended talking with PCP.     Iron Status  Lab Results   Component Value Date    IRON 42 09/25/2019    TIBC 306 09/25/2019    FERRITIN 25 09/25/2019        Vitamin D3  Lab Results   Component Value Date    VITD25 37 03/29/2022       Current Outpatient Medications on File Prior to Visit   Medication Sig Dispense Refill    amLODIPine (Norvasc) 10 mg tablet TAKE 1 TABLET BY MOUTH ONCE DAILY 90 tablet 1    ergocalciferol (Vitamin D-2) 1.25 MG (39987 UT) capsule Take 1 capsule (1,250 mcg) by mouth 1 (one) time per week.      estradiol (Vagifem) 10 mcg tablet vaginal tablet Insert 1 tablet (10 mcg) into the vagina once daily at bedtime. Insert 1 tablet into vagina at bedtime for 2 weeks, then at bedtime twice a week. 18 tablet 1    fluticasone (Flonase) 50 mcg/actuation nasal spray Administer 2 sprays into each nostril once daily.      folic acid (Folvite) 1 mg tablet Take 1 tablet (1 mg) by mouth once daily.      HYDROcodone-acetaminophen (Norco) 7.5-325 mg tablet Take 1 tablet by " "mouth every 8 hours if needed for severe pain (7 - 10) for up to 28 days. 84 tablet 0    [START ON 3/12/2024] HYDROcodone-acetaminophen (Norco) 7.5-325 mg tablet Take 1 tablet by mouth every 8 hours if needed for severe pain (7 - 10) for up to 28 days. Do not start before March 12, 2024. 84 tablet 0    methotrexate (Trexall) 2.5 mg tablet Take 5 tablets (12.5 mg total) by mouth 1 (one) time per week.      naloxone (Narcan) 4 mg/0.1 mL nasal spray Administer into affected nostril(s). spray one bottle into nostril while patient lay on there back , repeat if needed       No current facility-administered medications on file prior to visit.        Medication and allergy reconciliation completed     Drug Interactions   No significant drug interactions identified    Assessment/Plan     Patient is experiencing vaginal dryness. Tried vagifem and discontinued due to partner.     Discussed Imvexxy, patient stated \"if it costs a lot I do not want it\"- offered [facility] patient assistance program.     We discussed Imvexxy and I answered her questions regarding the medication. She would like to read about it further and does not want to start it at this time.     I asked her to please contact me after she has had time to consider the medication and/or if she would like to consider other options.        Follow-up: per patient, as needed     Time spent with pt: Total length of time 20 (minutes) of the encounter and more than 50% was spent counseling the patient.      Diamond Chan, Pharm.D, [facility], [facility]  Clinical Pharmacist  Pharmacy Services  [phone]    Continue all meds under the continuation of care with the referring provider and clinical pharmacy team.    Verbal consent to manage patient's drug therapy was obtained from the patient and/or an individual authorized to act on behalf of a patient. They were informed they may decline to participate or withdraw from participation in pharmacy services at any time.  "

## 2024-09-30 ENCOUNTER — PHARMACY VISIT (OUTPATIENT)
Dept: PHARMACY | Facility: MEDICAL CENTER | Age: 34
End: 2024-09-30
Payer: MEDICARE

## 2024-09-30 ENCOUNTER — HOSPITAL ENCOUNTER (EMERGENCY)
Facility: MEDICAL CENTER | Age: 34
End: 2024-09-30
Attending: STUDENT IN AN ORGANIZED HEALTH CARE EDUCATION/TRAINING PROGRAM
Payer: COMMERCIAL

## 2024-09-30 ENCOUNTER — APPOINTMENT (OUTPATIENT)
Dept: RADIOLOGY | Facility: MEDICAL CENTER | Age: 34
End: 2024-09-30
Attending: STUDENT IN AN ORGANIZED HEALTH CARE EDUCATION/TRAINING PROGRAM
Payer: COMMERCIAL

## 2024-09-30 VITALS
DIASTOLIC BLOOD PRESSURE: 68 MMHG | HEART RATE: 67 BPM | BODY MASS INDEX: 44.88 KG/M2 | OXYGEN SATURATION: 97 % | WEIGHT: 253.31 LBS | TEMPERATURE: 98.2 F | RESPIRATION RATE: 16 BRPM | HEIGHT: 63 IN | SYSTOLIC BLOOD PRESSURE: 130 MMHG

## 2024-09-30 DIAGNOSIS — S43.401A SPRAIN OF RIGHT SHOULDER, UNSPECIFIED SHOULDER SPRAIN TYPE, INITIAL ENCOUNTER: ICD-10-CM

## 2024-09-30 DIAGNOSIS — S46.011A TRAUMATIC TEAR OF RIGHT ROTATOR CUFF, UNSPECIFIED TEAR EXTENT, INITIAL ENCOUNTER: ICD-10-CM

## 2024-09-30 DIAGNOSIS — M54.12 CERVICAL RADICULOPATHY: ICD-10-CM

## 2024-09-30 DIAGNOSIS — M25.511 ACUTE PAIN OF RIGHT SHOULDER: ICD-10-CM

## 2024-09-30 PROCEDURE — 99284 EMERGENCY DEPT VISIT MOD MDM: CPT

## 2024-09-30 PROCEDURE — 73030 X-RAY EXAM OF SHOULDER: CPT | Mod: RT

## 2024-09-30 PROCEDURE — 73110 X-RAY EXAM OF WRIST: CPT | Mod: RT

## 2024-09-30 PROCEDURE — RXMED WILLOW AMBULATORY MEDICATION CHARGE: Performed by: STUDENT IN AN ORGANIZED HEALTH CARE EDUCATION/TRAINING PROGRAM

## 2024-09-30 PROCEDURE — RXOTC WILLOW AMBULATORY OTC CHARGE

## 2024-09-30 PROCEDURE — 73060 X-RAY EXAM OF HUMERUS: CPT | Mod: RT

## 2024-09-30 RX ORDER — METHOCARBAMOL 500 MG/1
500 TABLET, FILM COATED ORAL 3 TIMES DAILY PRN
Qty: 30 TABLET | Refills: 0 | Status: SHIPPED | OUTPATIENT
Start: 2024-09-30

## 2024-09-30 ASSESSMENT — PAIN DESCRIPTION - PAIN TYPE: TYPE: ACUTE PAIN

## 2024-09-30 ASSESSMENT — FIBROSIS 4 INDEX: FIB4 SCORE: 0.8

## 2024-12-29 VITALS
HEART RATE: 72 BPM | BODY MASS INDEX: 44.84 KG/M2 | OXYGEN SATURATION: 99 % | DIASTOLIC BLOOD PRESSURE: 74 MMHG | SYSTOLIC BLOOD PRESSURE: 128 MMHG | WEIGHT: 253.09 LBS | HEIGHT: 63 IN | TEMPERATURE: 97.6 F | RESPIRATION RATE: 18 BRPM

## 2024-12-29 LAB
ALBUMIN SERPL BCP-MCNC: 4.5 G/DL (ref 3.2–4.9)
ALBUMIN/GLOB SERPL: 1.3 G/DL
ALP SERPL-CCNC: 163 U/L (ref 30–99)
ALT SERPL-CCNC: 23 U/L (ref 2–50)
ANION GAP SERPL CALC-SCNC: 15 MMOL/L (ref 7–16)
AST SERPL-CCNC: 32 U/L (ref 12–45)
BASOPHILS # BLD AUTO: 0.6 % (ref 0–1.8)
BASOPHILS # BLD: 0.05 K/UL (ref 0–0.12)
BILIRUB SERPL-MCNC: 0.2 MG/DL (ref 0.1–1.5)
BUN SERPL-MCNC: 10 MG/DL (ref 8–22)
CALCIUM ALBUM COR SERPL-MCNC: 7.9 MG/DL (ref 8.5–10.5)
CALCIUM SERPL-MCNC: 8.3 MG/DL (ref 8.5–10.5)
CHLORIDE SERPL-SCNC: 106 MMOL/L (ref 96–112)
CO2 SERPL-SCNC: 18 MMOL/L (ref 20–33)
CREAT SERPL-MCNC: 0.59 MG/DL (ref 0.5–1.4)
EOSINOPHIL # BLD AUTO: 0.14 K/UL (ref 0–0.51)
EOSINOPHIL NFR BLD: 1.7 % (ref 0–6.9)
ERYTHROCYTE [DISTWIDTH] IN BLOOD BY AUTOMATED COUNT: 56.5 FL (ref 35.9–50)
GFR SERPLBLD CREATININE-BSD FMLA CKD-EPI: 119 ML/MIN/1.73 M 2
GLOBULIN SER CALC-MCNC: 3.4 G/DL (ref 1.9–3.5)
GLUCOSE SERPL-MCNC: 84 MG/DL (ref 65–99)
HCT VFR BLD AUTO: 38.1 % (ref 37–47)
HGB BLD-MCNC: 12.1 G/DL (ref 12–16)
IMM GRANULOCYTES # BLD AUTO: 0.02 K/UL (ref 0–0.11)
IMM GRANULOCYTES NFR BLD AUTO: 0.2 % (ref 0–0.9)
LIPASE SERPL-CCNC: 58 U/L (ref 11–82)
LYMPHOCYTES # BLD AUTO: 2.01 K/UL (ref 1–4.8)
LYMPHOCYTES NFR BLD: 24.2 % (ref 22–41)
MCH RBC QN AUTO: 30.9 PG (ref 27–33)
MCHC RBC AUTO-ENTMCNC: 31.8 G/DL (ref 32.2–35.5)
MCV RBC AUTO: 97.2 FL (ref 81.4–97.8)
MONOCYTES # BLD AUTO: 0.45 K/UL (ref 0–0.85)
MONOCYTES NFR BLD AUTO: 5.4 % (ref 0–13.4)
NEUTROPHILS # BLD AUTO: 5.64 K/UL (ref 1.82–7.42)
NEUTROPHILS NFR BLD: 67.9 % (ref 44–72)
NRBC # BLD AUTO: 0 K/UL
NRBC BLD-RTO: 0 /100 WBC (ref 0–0.2)
PLATELET # BLD AUTO: 255 K/UL (ref 164–446)
PMV BLD AUTO: 10.4 FL (ref 9–12.9)
POTASSIUM SERPL-SCNC: 3.6 MMOL/L (ref 3.6–5.5)
PROT SERPL-MCNC: 7.9 G/DL (ref 6–8.2)
RBC # BLD AUTO: 3.92 M/UL (ref 4.2–5.4)
SODIUM SERPL-SCNC: 139 MMOL/L (ref 135–145)
WBC # BLD AUTO: 8.3 K/UL (ref 4.8–10.8)

## 2024-12-29 PROCEDURE — 83690 ASSAY OF LIPASE: CPT

## 2024-12-29 PROCEDURE — 80053 COMPREHEN METABOLIC PANEL: CPT

## 2024-12-29 PROCEDURE — 81003 URINALYSIS AUTO W/O SCOPE: CPT

## 2024-12-29 PROCEDURE — 85025 COMPLETE CBC W/AUTO DIFF WBC: CPT

## 2024-12-29 PROCEDURE — 302449 STATCHG TRIAGE ONLY (STATISTIC)

## 2024-12-29 ASSESSMENT — FIBROSIS 4 INDEX: FIB4 SCORE: 0.8

## 2024-12-30 ENCOUNTER — HOSPITAL ENCOUNTER (EMERGENCY)
Facility: MEDICAL CENTER | Age: 34
End: 2024-12-30
Payer: COMMERCIAL

## 2024-12-30 LAB
APPEARANCE UR: CLEAR
BILIRUB UR QL STRIP.AUTO: NEGATIVE
COLOR UR: YELLOW
GLUCOSE UR STRIP.AUTO-MCNC: NEGATIVE MG/DL
KETONES UR STRIP.AUTO-MCNC: NEGATIVE MG/DL
LEUKOCYTE ESTERASE UR QL STRIP.AUTO: NEGATIVE
MICRO URNS: NORMAL
NITRITE UR QL STRIP.AUTO: NEGATIVE
PH UR STRIP.AUTO: 5.5 [PH] (ref 5–8)
PROT UR QL STRIP: NEGATIVE MG/DL
RBC UR QL AUTO: NEGATIVE
SP GR UR STRIP.AUTO: 1.01
UROBILINOGEN UR STRIP.AUTO-MCNC: 1 EU/DL

## 2024-12-30 PROCEDURE — 302449 STATCHG TRIAGE ONLY (STATISTIC)

## 2024-12-30 NOTE — CONSULTS
Neurology STROKE CODE H&P  Neurohospitalist Service, [facility]    Referring Physician: Preet Barrett D.O.    STROKE CODE:   Chief Complaint   Patient presents with    Sore Throat     Started last night    Ear Pain     L ear pain since last night    Numbness     L sided face and L arm numbness since 8pm last night    Headache     L sided headache that started last night       To obtain the most accurate data regarding the time called, and time patient seen, refer to the stroke run-sheet and chart.  For time of CT, refer to the radiology report. See A&P below for TPA Decision and door to needle time if and when applicable.    HPI: Abril Vance is a 32 y.o. female presenting for sore throat, left ear pain, and left sided arm, leg, and face paraesthesias. Patient reports these symptoms started last night at approximately 2000 and persisted throughout today. Strength appears symmetrical in all 4 extremities with only subjective numbness/tingling on the left side. A stroke IR alert was activated by the ERP due to last known well.     Patient was normotensive on room air on examination. A noncontrast CT head was obtained which was negative for acute intracranial abnormalities. CTA head/neck negative for LVO or critical flow limiting stenoses. Neurology has been consulted for further evaluation of the above.     Review of systems: In addition to what is detailed in the HPI above, all other systems reviewed and are negative.    Past Medical History:    has a past medical history of Arthritis, Gallstones, History of anemia, Migraines (4/5/2010), Morbid obesity (HCC) (4/5/2010), Sleep apnea, Snoring, and Vaginal trichomoniasis (4/5/2010).    She has no past medical history of CAD (coronary artery disease), COPD, Liver disease, or Seizure disorder ([facility]).    FHx:  Family history is unknown by patient.    SHx:   reports that she has never smoked. She has never used smokeless tobacco. She  reports current alcohol use. She reports that she does not use drugs.    Allergies:  Allergies   Allergen Reactions    Nkda [No Known Drug Allergy]        Medications:  No current facility-administered medications for this encounter.    Current Outpatient Medications:     acetaminophen (TYLENOL) 500 MG Tab, Take 1-2 Tablets by mouth every 6 hours as needed for Moderate Pain., Disp: 30 Tablet, Rfl: 0    Physical Examination:    Vitals:    04/19/23 1504 04/19/23 1514 04/19/23 1600   BP: 127/76  102/66   Pulse: 63  70   Resp: 14  (!) 26   Temp: 36.3 °C (97.3 °F)     TempSrc: Temporal     SpO2: 100%  99%   Weight:  104 kg (230 lb 2.6 oz)          General: Patient is awake and in no acute distress  Eye: Examination of optic disks not indicated at this time given acuity of consult  Neck: There is normal range of motion  CV: Regular rate   Extremities:  Clear, dry, intact, without peripheral edema    NEUROLOGICAL EXAM:     Mental status: Awake, alert and fully oriented  Speech and language: Speech is clear and fluent. The patient is able to name and repeat, and follow commands  Cranial nerve exam: Pupils are equal, round and reactive to light bilaterally. Visual fields are full. There is no nystagmus. Extraocular muscles are intact. Face is symmetric. Sensation in the face is intact to light touch. Palate elevates symmetrically. Tongue is midline.  Motor exam: There is sustained antigravity with no downward drift in bilateral arms and legs.  There is no pronator drift.  Tone is normal. No abnormal movements were seen on exam.  Sensory exam:  left face, arm, and leg paraesthesias  Deep tendon reflexes:  2+ throughout. Toes down-going bilaterally.  Coordination: No ataxia on bilateral finger-to-nose testing.  Gait: Deferred due to patient preference.    NIHSS: National Institutes of Health Stroke Scale    [0] 1a:Level of Consciousness    0-alert 1-drowsy   2-stupor   3-coma  [0] 1b:LOC Questions                  0-both   1-one      2-neither  [0] 1c:LOC Commands                   0-both  1-one      2-neither  [0] 2: Best Gaze                     0-nl    1-partial  2-forced  [0] 3: Visual Fields                   0-nl    1-partial  2-complete 3-bilat  [0] 4: Facial Paresis                0-nl    1-minor    2-partial  3-full  MOTOR                       0-nl  [0] 5: Right Arm           1-drift  [0] 6: Left Arm             2-some effort vs gravity  [0] 7: Right Leg           3-no effort vs gravity  [0] 8: Left Leg             4-no movement                             x-untestable  [0] 9: Limb Ataxia                    0-abs   1-1_limb   2-2+_limbs       x-untestable  [1] 10:Sensory                        0-nl    1-partial  2-dense  [0] 11:Best Language/Aphasia         0-nl    1-mild/mod 2-severe   3-mute  [0] 12:Dysarthria                     0-nl    1-mild/mod 2-severe       x-untestable  [0] 13:Neglect/Inattention            0-none  1-partial  2-complete  [1] TOTAL    Baseline Modified [place] Scale (MRS): 1 = No significant disability, despite symptoms; able to perform all usual duties and activities    Objective Data:    Labs:  Lab Results   Component Value Date/Time    PROTHROMBTM 12.8 04/20/2020 08:20 PM    INR 0.94 04/20/2020 08:20 PM      Lab Results   Component Value Date/Time    WBC 5.7 03/13/2023 02:07 PM    RBC 4.02 (L) 03/13/2023 02:07 PM    HEMOGLOBIN 13.0 03/13/2023 02:07 PM    HEMATOCRIT 39.3 03/13/2023 02:07 PM    MCV 97.8 03/13/2023 02:07 PM    MCH 32.3 03/13/2023 02:07 PM    MCHC 33.1 (L) 03/13/2023 02:07 PM    MPV 10.5 03/13/2023 02:07 PM    NEUTSPOLYS 60.70 03/13/2023 02:07 PM    LYMPHOCYTES 29.50 03/13/2023 02:07 PM    MONOCYTES 7.20 03/13/2023 02:07 PM    EOSINOPHILS 1.20 03/13/2023 02:07 PM    BASOPHILS 1.20 03/13/2023 02:07 PM    HYPOCHROMIA 1+ 10/21/2019 11:50 AM    ANISOCYTOSIS 1+ 10/21/2019 11:50 AM      Lab Results   Component Value Date/Time    SODIUM 139 03/13/2023 02:07 PM    POTASSIUM 4.4 03/13/2023  02:07 PM    CHLORIDE 107 03/13/2023 02:07 PM    CO2 19 (L) 03/13/2023 02:07 PM    GLUCOSE 80 03/13/2023 02:07 PM    BUN 12 03/13/2023 02:07 PM    CREATININE 0.67 03/13/2023 02:07 PM    CREATININE 0.7 04/28/2009 11:42 AM      No results found for: CHOLSTRLTOT, LDL, HDL, TRIGLYCERIDE    Lab Results   Component Value Date/Time    ALKPHOSPHAT 198 (H) 03/13/2023 02:07 PM    ASTSGOT 25 03/13/2023 02:07 PM    ALTSGPT 22 03/13/2023 02:07 PM    TBILIRUBIN 0.5 03/13/2023 02:07 PM        Imaging/Testing:    I interpreted and/or reviewed the patient's neuroimaging    CT-CTA HEAD WITH & W/O-POST PROCESS   Final Result      CT angiogram of the [place] within normal limits.      CT-HEAD W/O   Final Result      Head CT without contrast within normal limits. No evidence of acute cerebral infarction, hemorrhage or mass lesion.         CT-CTA NECK WITH & W/O-POST PROCESSING    (Results Pending)       Assessment and Plan:    Abril Vance is a 32 y.o. female presenting with sore throat, left ear pain, left face, arm, and leg numbness and tingling. Symptoms began last night at 2000.  Noncontrast CT head negative for acute intracranial abnormalities.  Stroke alert activated by ERP due to last known well.  CTA head/neck negative for LVO or critical flow-limiting stenosis.  Low suspicion for stroke, likely stroke mimic from migraine etiology    Recommend obtaining MRI brain with and without contrast if symptoms persist.  No acute neurological interventions indicated at this time. If MRI brain results positive for acute findings please reconsult Vascular Neurology.    Case reviewed and plan created with Dr. Keiry Park, Acute Neurology. Please call with any questions.      Theodore Katz, KENYA  Neurology, Acute Care Services

## 2024-12-30 NOTE — ED NOTES
Patient called x2 no response. Checked the lobby, bathroom, phleb, family room and all associated areas.

## 2024-12-30 NOTE — ED TRIAGE NOTES
Chief Complaint   Patient presents with    Flank Pain     Right flank pain for 1 week and Right sided abdominal pain  + Dysuria    Abdominal Pain    Painful Urination     Pain was unrelieved by Tylenol    Pain:  8/10  Ambulatory:  Yes  Alert and Oriented: x 4  Oxygen: No    Pt came in to triage for the above complaints.     Pt is speaking in full sentences, follows commands and responds appropriately to questions.     Respirations are even and unlabored.    Pt placed in lobby. Pt educated on triage process.     Pt encouraged to inform staff for any changes in condition or if needs help while waiting to be room in.    Vitals:    12/29/24 2050   BP: (!) 162/94   Pulse: 86   Resp: 16   Temp: 36.1 °C (96.9 °F)   SpO2: 100%

## 2025-05-14 ENCOUNTER — HOSPITAL ENCOUNTER (EMERGENCY)
Facility: MEDICAL CENTER | Age: 35
End: 2025-05-14
Attending: EMERGENCY MEDICINE
Payer: COMMERCIAL

## 2025-05-14 VITALS
RESPIRATION RATE: 16 BRPM | BODY MASS INDEX: 45.86 KG/M2 | OXYGEN SATURATION: 99 % | TEMPERATURE: 97.2 F | DIASTOLIC BLOOD PRESSURE: 69 MMHG | HEART RATE: 71 BPM | SYSTOLIC BLOOD PRESSURE: 132 MMHG | WEIGHT: 258.82 LBS | HEIGHT: 63 IN

## 2025-05-14 DIAGNOSIS — G43.909 MIGRAINE WITHOUT STATUS MIGRAINOSUS, NOT INTRACTABLE, UNSPECIFIED MIGRAINE TYPE: Primary | ICD-10-CM

## 2025-05-14 PROCEDURE — 96375 TX/PRO/DX INJ NEW DRUG ADDON: CPT

## 2025-05-14 PROCEDURE — 700111 HCHG RX REV CODE 636 W/ 250 OVERRIDE (IP): Mod: UD | Performed by: EMERGENCY MEDICINE

## 2025-05-14 PROCEDURE — 700105 HCHG RX REV CODE 258: Mod: UD | Performed by: EMERGENCY MEDICINE

## 2025-05-14 PROCEDURE — 99284 EMERGENCY DEPT VISIT MOD MDM: CPT

## 2025-05-14 PROCEDURE — 700102 HCHG RX REV CODE 250 W/ 637 OVERRIDE(OP): Mod: UD | Performed by: EMERGENCY MEDICINE

## 2025-05-14 PROCEDURE — 36415 COLL VENOUS BLD VENIPUNCTURE: CPT

## 2025-05-14 PROCEDURE — A9270 NON-COVERED ITEM OR SERVICE: HCPCS | Mod: UD | Performed by: EMERGENCY MEDICINE

## 2025-05-14 PROCEDURE — 96374 THER/PROPH/DIAG INJ IV PUSH: CPT

## 2025-05-14 RX ORDER — METOCLOPRAMIDE HYDROCHLORIDE 5 MG/ML
10 INJECTION INTRAMUSCULAR; INTRAVENOUS ONCE
Status: COMPLETED | OUTPATIENT
Start: 2025-05-14 | End: 2025-05-14

## 2025-05-14 RX ORDER — DEXAMETHASONE SODIUM PHOSPHATE 4 MG/ML
4 INJECTION, SOLUTION INTRA-ARTICULAR; INTRALESIONAL; INTRAMUSCULAR; INTRAVENOUS; SOFT TISSUE ONCE
Status: COMPLETED | OUTPATIENT
Start: 2025-05-14 | End: 2025-05-14

## 2025-05-14 RX ORDER — DIPHENHYDRAMINE HYDROCHLORIDE 50 MG/ML
25 INJECTION, SOLUTION INTRAMUSCULAR; INTRAVENOUS ONCE
Status: COMPLETED | OUTPATIENT
Start: 2025-05-14 | End: 2025-05-14

## 2025-05-14 RX ORDER — KETOROLAC TROMETHAMINE 15 MG/ML
15 INJECTION, SOLUTION INTRAMUSCULAR; INTRAVENOUS ONCE
Status: COMPLETED | OUTPATIENT
Start: 2025-05-14 | End: 2025-05-14

## 2025-05-14 RX ORDER — SODIUM CHLORIDE 9 MG/ML
1000 INJECTION, SOLUTION INTRAVENOUS ONCE
Status: COMPLETED | OUTPATIENT
Start: 2025-05-14 | End: 2025-05-14

## 2025-05-14 RX ORDER — BUTALBITAL, ACETAMINOPHEN AND CAFFEINE 50; 325; 40 MG/1; MG/1; MG/1
1 TABLET ORAL ONCE
Status: COMPLETED | OUTPATIENT
Start: 2025-05-14 | End: 2025-05-14

## 2025-05-14 RX ADMIN — DEXAMETHASONE SODIUM PHOSPHATE 4 MG: 4 INJECTION INTRA-ARTICULAR; INTRALESIONAL; INTRAMUSCULAR; INTRAVENOUS; SOFT TISSUE at 17:01

## 2025-05-14 RX ADMIN — SODIUM CHLORIDE 1000 ML: 9 INJECTION, SOLUTION INTRAVENOUS at 16:57

## 2025-05-14 RX ADMIN — BUTALBITAL, ACETAMINOPHEN AND CAFFEINE 1 TABLET: 325; 50; 40 TABLET ORAL at 16:57

## 2025-05-14 RX ADMIN — METOCLOPRAMIDE 10 MG: 5 INJECTION, SOLUTION INTRAMUSCULAR; INTRAVENOUS at 17:04

## 2025-05-14 RX ADMIN — DIPHENHYDRAMINE HYDROCHLORIDE 25 MG: 50 INJECTION, SOLUTION INTRAMUSCULAR; INTRAVENOUS at 16:58

## 2025-05-14 RX ADMIN — KETOROLAC TROMETHAMINE 15 MG: 15 INJECTION, SOLUTION INTRAMUSCULAR; INTRAVENOUS at 16:59

## 2025-05-14 ASSESSMENT — FIBROSIS 4 INDEX: FIB4 SCORE: 0.94

## 2025-05-14 NOTE — ED PROVIDER NOTES
ED Provider Note    CHIEF COMPLAINT  Chief Complaint   Patient presents with    Migraine     Patient has had persistent migraine for 15 days and now is having nausea and diarrhea and ear pain on left side. She went to [facility] on Thursday and has not had relief with meds. They told her she may also have vertigo       EXTERNAL RECORDS REVIEWED  4/19/2023, neurology consult note: Left ear pain, left facial numbness and tingling, head CT with CTA head and neck performed and unremarkable.    4/19/2023, 9/23/2020, 4/20/2020 CT heads: All negative  4/19/2023, 6/11/2021 CTA head: Normal    HPI/ROS    Abril Vance is a 34 y.o. female who presents for evaluation of a headache that is consistent with her regular migraines but somehow different.  She states has been going on for 15 days now and she just feels like there is something wrong inside of her head.  She reports that she has some muscle twitching involving her center of the forehead and has never had before.  She also has some discomfort on the left side face and left ear.  These are all new symptoms.  Saw her primary care physician last week, the medications do not seem to be working.  No focal weakness numbness or tingling.  No injuries.  No other complaints    PAST MEDICAL HISTORY   has a past medical history of Arthritis, Gallstones, History of anemia, Migraines (04/05/2010), Morbid obesity (HCC) (04/05/2010), Sleep apnea, Snoring, and Vaginal trichomoniasis (04/05/2010).    SURGICAL HISTORY   has a past surgical history that includes lauren by laparoscopy (10/14/2010); other (2016); vaginal hysterectomy scope total (09/25/2018); salpingectomy (Bilateral, 09/25/2018); anterior and posterior repair (09/25/2018); enterocele repair (09/25/2018); bladder sling female (09/25/2018); vaginal suspension (09/25/2018); unlisted proc, arthroscopy (Left, 01/02/2023); repair collat ankle ligmnt,secondary (Left, 01/02/2023); release tib/fib/ankle flex tendon,ea (Left,  "01/02/2023); and other abdominal surgery.    FAMILY HISTORY  Family History   Family history unknown: Yes       SOCIAL HISTORY  Social History     Tobacco Use    Smoking status: Never    Smokeless tobacco: Never   Vaping Use    Vaping status: Never Used   Substance and Sexual Activity    Alcohol use: Yes     Comment: occasionally    Drug use: No    Sexual activity: Not on file       CURRENT MEDICATIONS  Home Medications       Reviewed by Colleen Rogel R.N. (Registered Nurse) on 05/14/25 at 1518  Med List Status: Partial     Medication Last Dose Status   cyclobenzaprine (FLEXERIL) 10 mg Tab  Active   meloxicam (MOBIC) 15 MG tablet  Active   meloxicam (MOBIC) 7.5 MG Tab  Active   methocarbamol (ROBAXIN) 500 MG Tab  Active   methylPREDNISolone (MEDROL DOSEPAK) 4 MG Tablet Therapy Pack  Active                    ALLERGIES  Allergies[1]    PHYSICAL EXAM  VITAL SIGNS: /70   Pulse 77   Temp 36.3 °C (97.3 °F) (Temporal)   Resp 16   Ht 1.6 m (5' 3\")   Wt 117 kg (258 lb 13.1 oz)   LMP 09/20/2018 (Approximate) Comment: Age of first period:9, no HRT  SpO2 100%   BMI 45.85 kg/m²    Constitutional: Alert in no apparent distress.  HENT: No signs of significant acute trauma.  Normal tympanic membranes bilaterally.  Eyes: Conjunctiva normal, non-icteric.   Chest: Normal nonlabored respirations  Skin: No appreciable rash on the exposed skin  Musculoskeletal: No obvious acute trauma appreciated  Neurologic: Alert, no obvious focal deficits noted.  Normal and symmetric upper lower extremity motor and sensory function bilaterally.        COURSE & MEDICAL DECISION MAKING    ASSESSMENT, COURSE AND PLAN  Care Narrative: This is a 34-year-old female with a history of migraines who comes in with a migraine headache.  She reports is different than prior because she has some muscle twitching in her forehead as well as discomfort involving the left ear.  No focal neurologic complaints or findings.  No injury.  The patient " indicates she really would like some imaging of her brain but at this point, given her chronic migraines as well as the absence of focal neurologic complaints or findings I just do not think the risks of yet another CT scan is worth the benefit.  Looking back through her chart she has had many CTs of her head including CTAs in the past 5 years.  At this point we will treat her with IV fluids, Toradol, Reglan, Benadryl, Decadron and Fioricet and she will be reevaluated    Upon reevaluation the patient has significant improvement of her headache.  I will prescribe Fioricet.  She is discharged home in stable condition with strict return instructions provided  Prescription for Fioricet    In prescribing controlled substances to this patient, I certify that I have obtained and reviewed the medical history of Abril Vance. I have also made a good chris effort to obtain applicable records from other providers who have treated the patient.    I have conducted a physical exam and documented it. I have reviewed Ms. Vance’s prescription history as maintained by the [place] Prescription Monitoring Program.     I have assessed the patient’s risk for abuse, dependency, and addiction using the validated Opioid Risk Tool    Given the above, I believe the benefits of controlled substance therapy outweigh the risks. The reasons for prescribing controlled substances include my professional opinion that controlled substances are a reasonable choice for this patient in the event other methods are ineffective inadequately controlling pain. Accordingly, I have discussed the risk and benefits, treatment plan, and alternative therapies with the patient.         DISPOSITION AND DISCUSSIONS    Escalation of care considered, and ultimately not performed: I did consider a CT of the head but this is a chronic reoccurring problem, plus the patient has had many CTs in the past 5 years, I thought the risks outweigh the benefit in this  case      FINAL DIAGNOSIS  1. Migraine without status migrainosus, not intractable, unspecified migraine type         Electronically signed by: Oneil Ng M.D., 5/14/2025 4:13 PM           [1]   Allergies  Allergen Reactions    Nkda [No Known Drug Allergy]

## 2025-05-14 NOTE — ED TRIAGE NOTES
"Chief Complaint   Patient presents with    Migraine     Patient has had persistent migraine for 15 days and now is having nausea and diarrhea and ear pain on left side. She went to [facility] on Thursday and has not had relief with meds. They told her she may also have vertigo       Patient to triage ambulatory with a steady gait, AAOx4, Appropriate precautions in place.     Explained wait time and triage process. Placed back in lobby. Told to notify ED tech or RN of any changes, verbalized understanding.    /70   Pulse 77   Temp 36.3 °C (97.3 °F) (Temporal)   Resp 16   Ht 1.6 m (5' 3\")   Wt 117 kg (258 lb 13.1 oz)   LMP 09/20/2018 (Approximate) Comment: Age of first period:9, no HRT  SpO2 100%   BMI 45.85 kg/m²     "

## (undated) DEVICE — SUTURE 2-0 VICRYL PLUS CT-1 36 (36PK/BX)"

## (undated) DEVICE — ELECTRODE 850 FOAM ADHESIVE - HYDROGEL RADIOTRNSPRNT (50/PK)

## (undated) DEVICE — SET IRRIGATION CYSTOSCOPY TUBE L80 IN (20EA/CA)

## (undated) DEVICE — WRAP CO-FLEX 4IN X 5YD STERIL - SELF-ADHERENT (18/CA)

## (undated) DEVICE — DRESSING 3X8 ADAPTIC GAUZE - NON-ADHERING (36/PK 6PK/BX)

## (undated) DEVICE — ANTI-FOG SOLUTION - 60BTL/CA

## (undated) DEVICE — SET EXTENSION WITH 2 PORTS (48EA/CA) ***PART #2C8610 IS A SUBSTITUTE*****

## (undated) DEVICE — TRAY FOLEY CATHETER STATLOCK 16FR SURESTEP  (10EA/CA)

## (undated) DEVICE — GLOVE BIOGEL SZ 6.5 SURGICAL PF LTX (50PR/BX 4BX/CA)

## (undated) DEVICE — TRAY SRGPRP PVP IOD WT PRP - (20/CA)

## (undated) DEVICE — BLADE SURGICAL #11 - (50/BX)

## (undated) DEVICE — CUFF TOURNIQUET 44 X 4 ONE PORT DISP - STERILE (10/CA)

## (undated) DEVICE — PADDING CAST 6 IN X 4 YDS - SOF-ROLL (6RL/BG 6BG/CA)

## (undated) DEVICE — PAD SANITARY 11IN MAXI IND WRAPPED  (12EA/PK 24PK/CA)

## (undated) DEVICE — DRAPE VAGINAL BIB W/ POUCH (10EA/CA)

## (undated) DEVICE — ELECTRODE DUAL RETURN W/ CORD - (50/PK)

## (undated) DEVICE — CHLORAPREP 26 ML APPLICATOR - ORANGE TINT(25/CA)

## (undated) DEVICE — SENSOR OXIMETER ADULT SPO2 RD SET (20EA/BX)

## (undated) DEVICE — SUTURE O FIBERWIRE - (12/BX)

## (undated) DEVICE — GLOVE BIOGEL SZ 8 SURGICAL PF LTX - (50PR/BX 4BX/CA)

## (undated) DEVICE — SUTURE GENERAL

## (undated) DEVICE — CANISTER SUCTION RIGID RED 1500CC (40EA/CA)

## (undated) DEVICE — SYRINGE 30 ML LL (56/BX)

## (undated) DEVICE — GLOVE BIOGEL PI INDICATOR SZ 7.0 SURGICAL PF LF - (50/BX 4BX/CA)

## (undated) DEVICE — TROCAR STEP 11MM - (3/CA)

## (undated) DEVICE — PAD PREP 24 X 48 CUFFED - (100/CA)

## (undated) DEVICE — MASK ANESTHESIA ADULT  - (100/CA)

## (undated) DEVICE — GOWN SURGEONS X-LARGE - DISP. (30/CA)

## (undated) DEVICE — BLADE SURGICAL #15 - (50/BX 3BX/CA)

## (undated) DEVICE — SUCTION INSTRUMENT YANKAUER BULBOUS TIP W/O VENT (50EA/CA)

## (undated) DEVICE — DRAPE LARGE 3 QUARTER - (20/CA)

## (undated) DEVICE — SENSOR SPO2 NEO LNCS ADHESIVE (20/BX) SEE USER NOTES

## (undated) DEVICE — PACK MINOR BASIN - (2EA/CA)

## (undated) DEVICE — KIT  I.V. START (100EA/CA)

## (undated) DEVICE — WATER IRRIGATION STERILE 1000ML (12EA/CA)

## (undated) DEVICE — GLOVESZ 8.5 BIOGEL PI MICRO - PF LF (50PR/BX)

## (undated) DEVICE — COVER LIGHT HANDLE ALC PLUS DISP (18EA/BX)

## (undated) DEVICE — GLOVE SZ 7 BIOGEL PI MICRO - PF LF (50PR/BX 4BX/CA)

## (undated) DEVICE — SET LEADWIRE 5 LEAD BEDSIDE DISPOSABLE ECG (1SET OF 5/EA)

## (undated) DEVICE — SUTURE 3-0 VICRYL PLUS SH - 8X 18 INCH (12/BX)

## (undated) DEVICE — DEVICE SUTURE CAPTURING

## (undated) DEVICE — SPLINT PLASTER 5 IN X 30 IN - (50EA/BX 6BX/CA)

## (undated) DEVICE — TUBING CLEARLINK DUO-VENT - C-FLO (48EA/CA)

## (undated) DEVICE — DRESSING ABDOMINAL PAD STERILE 8 X 10" (360EA/CA)"

## (undated) DEVICE — MASK OXYGEN VNYL ADLT MED CONC WITH 7 FOOT TUBING  - (50EA/CA)

## (undated) DEVICE — GLOVE BIOGEL ECLIPSE  PF LATEX SIZE 6.5 (50PR/BX)

## (undated) DEVICE — TUBING SETDISPOS HIGH FLOW II - (10/BX)

## (undated) DEVICE — TUBE CONNECTING SUCTION - CLEAR PLASTIC STERILE 72 IN (50EA/CA)

## (undated) DEVICE — NEEDLE MAYO CATGUT TPR POINT - (2EA/PK20PK/BX)

## (undated) DEVICE — LACTATED RINGERS INJ 1000 ML - (14EA/CA 60CA/PF)

## (undated) DEVICE — DRESSING TRANSPARENT FILM TEGADERM 4 X 4.75" (50EA/BX)"

## (undated) DEVICE — SPEAR EYE SPNG 3ANG MLBL HNDL - (10/ST18ST/PK 180/PK 1PK/SP)

## (undated) DEVICE — GLOVE SZ 6.5 BIOGEL PI MICRO - PF LF (50PR/BX)

## (undated) DEVICE — DEVICE CLOSURE KIT VISTASEAL 4ML (1EA/BX)

## (undated) DEVICE — MANIFOLD NEPTUNE 1 PORT (20/PK)

## (undated) DEVICE — STOCKINET BIAS 6 IN STERILE - (20/CA)

## (undated) DEVICE — SUTURE 0 VICRYL PLUS CT-1 - 8 X 18 INCH (12/BX)

## (undated) DEVICE — CANNULA W/ SUPPLY TUBING O2 - (50/CA)

## (undated) DEVICE — CANISTER SUCTION 3000ML MECHANICAL FILTER AUTO SHUTOFF MEDI-VAC NONSTERILE LF DISP  (40EA/CA)

## (undated) DEVICE — SUTURE 4-0 VICRYL PLUS FS-2 - 27 INCH (36/BX)

## (undated) DEVICE — BANDAGE ELASTIC STERILE VELCRO 6 X 5 YDS (25EA/CA)

## (undated) DEVICE — TROCAR STEP 5MM - (3/CA)

## (undated) DEVICE — PACK LOWER EXTREMITY - (2/CA)

## (undated) DEVICE — SODIUM CHL IRRIGATION 0.9% 1000ML (12EA/CA)

## (undated) DEVICE — SHAVER 3.5 RESECTOR FORMULA (5EA/BX)

## (undated) DEVICE — SODIUM CHL. IRRIGATION 0.9% 3000ML (4EA/CA 65CA/PF)

## (undated) DEVICE — PACK LAPAROSCOPY - (1/CA)

## (undated) DEVICE — SUTURE 3-0 ETHILON PS-1 (36PK/BX)

## (undated) DEVICE — SET SUCTION/IRRIGATION WITH DISPOSABLE TIP (6/CA )PART #0250-070-520 IS A SUB

## (undated) DEVICE — ARMREST CRADLE FOAM - (2PR/PK 12PR/CA)

## (undated) DEVICE — SUTURE CAPIO (12EA/BX)

## (undated) DEVICE — DRAPE STRLE REG TOWEL 18X24 - (10/BX 4BX/CA)"

## (undated) DEVICE — DRAPE ARTHROSCOPE (ACL) - (10/CA)

## (undated) DEVICE — HEAD HOLDER JUNIOR/ADULT

## (undated) DEVICE — GOWN WARMING STANDARD FLEX - (30/CA)

## (undated) DEVICE — SLEEVE VASO CALF MED - (10PR/CA)

## (undated) DEVICE — BANDAGE ELASTIC 6 HONEYCOMB - 6X5YD LF (20/CA)"

## (undated) DEVICE — NEEDLE INSUFFLATION FOR STEP - (12/BX)

## (undated) DEVICE — NEEDLE SAFETY 18 GA X 1 1/2 IN (100EA/BX)

## (undated) DEVICE — LIGASURE LAPAROSCOPIC 5MM - (6EA/CA)

## (undated) DEVICE — CATHETER IV 20 GA X 1-1/4 ---SURG.& SDS ONLY--- (50EA/BX)

## (undated) DEVICE — SYRINGE 10 ML CONTROL LL (25EA/BX 4BX/CA)

## (undated) DEVICE — SLEEVE, VASO, THIGH, MED

## (undated) DEVICE — GLOVE BIOGEL INDICATOR SZ 6.5 SURGICAL PF LTX - (50PR/BX 4BX/CA)

## (undated) DEVICE — TUBING CASSETTE CROSSFLOW INTEGRATED (10EA/CA)

## (undated) DEVICE — KIT ANESTHESIA W/CIRCUIT & 3/LT BAG W/FILTER (20EA/CA)

## (undated) DEVICE — BANDAID X-LARGE 2 X 4 IN LF (50EA/BX)

## (undated) DEVICE — PADDING CAST 6 IN STERILE - 6 X 4 YDS (24/CA)

## (undated) DEVICE — BANDAGE ELASTIC 4 HONEYCOMB - 4"X5YD LF (20/CA)"

## (undated) DEVICE — TUBING DAY USE W/CARTRIDGE (10EA/BX)

## (undated) DEVICE — GLOVE BIOGEL PI INDICATOR SZ 8.0 SURGICAL PF LF -(50/BX 4BX/CA)

## (undated) DEVICE — GLOVE BIOGEL PI INDICATOR SZ 7.5 SURGICAL PF LF -(50/BX 4BX/CA)

## (undated) DEVICE — TUBE E-T HI-LO CUFF 7.0MM (10EA/PK)

## (undated) DEVICE — GLOVE BIOGEL ECLIPSE PF LATEX SIZE 8.0  (50PR/BX)

## (undated) DEVICE — TOWEL STOP TIMEOUT SAFETY FLAG (40EA/CA)

## (undated) DEVICE — BANDAID SHEER STRIP 3/4 IN (100EA/BX 12BX/CA)